# Patient Record
Sex: FEMALE | Race: WHITE | NOT HISPANIC OR LATINO | Employment: UNEMPLOYED | URBAN - METROPOLITAN AREA
[De-identification: names, ages, dates, MRNs, and addresses within clinical notes are randomized per-mention and may not be internally consistent; named-entity substitution may affect disease eponyms.]

---

## 2016-08-09 LAB — HIV 1+2 AB+HIV1 P24 AG SERPL QL IA: NEGATIVE

## 2017-10-15 ENCOUNTER — HOSPITAL ENCOUNTER (EMERGENCY)
Facility: HOSPITAL | Age: 52
Discharge: HOME | End: 2017-10-16
Attending: EMERGENCY MEDICINE
Payer: COMMERCIAL

## 2017-10-15 DIAGNOSIS — R07.9 CHEST PAIN: Primary | ICD-10-CM

## 2017-10-15 LAB
ALBUMIN SERPL BCP-MCNC: 4 G/DL
ALP SERPL-CCNC: 108 U/L
ALT SERPL W/O P-5'-P-CCNC: 17 U/L
ANION GAP SERPL CALC-SCNC: 6 MMOL/L
AST SERPL-CCNC: 35 U/L
BASOPHILS # BLD AUTO: 0.03 K/UL
BASOPHILS NFR BLD: 0.3 %
BILIRUB SERPL-MCNC: 0.3 MG/DL
BNP SERPL-MCNC: 102 PG/ML
BUN SERPL-MCNC: 16 MG/DL
CALCIUM SERPL-MCNC: 9.3 MG/DL
CHLORIDE SERPL-SCNC: 104 MMOL/L
CO2 SERPL-SCNC: 28 MMOL/L
CREAT SERPL-MCNC: 0.8 MG/DL
DIFFERENTIAL METHOD: ABNORMAL
EOSINOPHIL # BLD AUTO: 0.2 K/UL
EOSINOPHIL NFR BLD: 1.9 %
ERYTHROCYTE [DISTWIDTH] IN BLOOD BY AUTOMATED COUNT: 14.4 %
EST. GFR  (AFRICAN AMERICAN): >60 ML/MIN/1.73 M^2
EST. GFR  (NON AFRICAN AMERICAN): >60 ML/MIN/1.73 M^2
GLUCOSE SERPL-MCNC: 87 MG/DL
HCT VFR BLD AUTO: 36.2 %
HGB BLD-MCNC: 11.5 G/DL
LYMPHOCYTES # BLD AUTO: 1.9 K/UL
LYMPHOCYTES NFR BLD: 21.2 %
MCH RBC QN AUTO: 28.5 PG
MCHC RBC AUTO-ENTMCNC: 31.8 G/DL
MCV RBC AUTO: 90 FL
MONOCYTES # BLD AUTO: 0.8 K/UL
MONOCYTES NFR BLD: 8.6 %
NEUTROPHILS # BLD AUTO: 6 K/UL
NEUTROPHILS NFR BLD: 67.8 %
PLATELET # BLD AUTO: 333 K/UL
PMV BLD AUTO: 9.6 FL
POTASSIUM SERPL-SCNC: 4.7 MMOL/L
PROT SERPL-MCNC: 7.5 G/DL
RBC # BLD AUTO: 4.03 M/UL
SODIUM SERPL-SCNC: 138 MMOL/L
TROPONIN I SERPL DL<=0.01 NG/ML-MCNC: <0.006 NG/ML
WBC # BLD AUTO: 8.91 K/UL

## 2017-10-15 PROCEDURE — 25000003 PHARM REV CODE 250: Performed by: EMERGENCY MEDICINE

## 2017-10-15 PROCEDURE — 85025 COMPLETE CBC W/AUTO DIFF WBC: CPT

## 2017-10-15 PROCEDURE — 80053 COMPREHEN METABOLIC PANEL: CPT

## 2017-10-15 PROCEDURE — 83880 ASSAY OF NATRIURETIC PEPTIDE: CPT

## 2017-10-15 PROCEDURE — 84484 ASSAY OF TROPONIN QUANT: CPT

## 2017-10-15 PROCEDURE — 99284 EMERGENCY DEPT VISIT MOD MDM: CPT

## 2017-10-15 PROCEDURE — 93005 ELECTROCARDIOGRAM TRACING: CPT

## 2017-10-15 RX ORDER — ASPIRIN 325 MG
325 TABLET ORAL
Status: COMPLETED | OUTPATIENT
Start: 2017-10-15 | End: 2017-10-15

## 2017-10-15 RX ORDER — DEXLANSOPRAZOLE 60 MG/1
60 CAPSULE, DELAYED RELEASE ORAL DAILY
COMMUNITY
End: 2020-07-02 | Stop reason: SDUPTHER

## 2017-10-15 RX ORDER — ESCITALOPRAM OXALATE 10 MG/1
10 TABLET ORAL DAILY
COMMUNITY
End: 2020-03-18 | Stop reason: SDUPTHER

## 2017-10-15 RX ADMIN — ASPIRIN 325 MG ORAL TABLET 325 MG: 325 PILL ORAL at 08:10

## 2017-10-16 VITALS
TEMPERATURE: 98 F | WEIGHT: 190 LBS | HEIGHT: 62 IN | DIASTOLIC BLOOD PRESSURE: 72 MMHG | OXYGEN SATURATION: 98 % | SYSTOLIC BLOOD PRESSURE: 111 MMHG | RESPIRATION RATE: 16 BRPM | BODY MASS INDEX: 34.96 KG/M2 | HEART RATE: 77 BPM

## 2017-10-16 LAB — TROPONIN I SERPL DL<=0.01 NG/ML-MCNC: <0.006 NG/ML

## 2017-10-16 PROCEDURE — 93005 ELECTROCARDIOGRAM TRACING: CPT

## 2017-10-16 PROCEDURE — 84484 ASSAY OF TROPONIN QUANT: CPT

## 2017-10-16 NOTE — ED NOTES
Pt resting in bed. No acute distress noted. Respirations even and unlabored. Pt updated on plan of care. Will continue to monitor.

## 2017-10-16 NOTE — DISCHARGE INSTRUCTIONS
Your chest pain does not appear to be cardiac.  Take zantac as directed on package for the next week or 2.  If you still have chest pain, see your doctor for further evaluation.

## 2017-10-16 NOTE — ED PROVIDER NOTES
Encounter Date: 10/15/2017       History     Chief Complaint   Patient presents with    Chest Pain     c/o midsternal chest pain and SOB that began suddenly less than an hour ago. States pain radiated to left arm. Pain was relieved moderately with rest.      52F presents with acute onset of CP.  She states she was in the kitchen when she had acute onset of chest tightness in between her breasts.  The pain was severe and constant for about 10 minutes with associated shortness of breath, dizziness, and nausea.  There was no radiation and there were no modifying factors.  The pain resolved spontaneously and has not returned.  She did get concerned so she decided to come to the ER to be checked out.  She has a history of similar pain with anxiety/panic attacks.  She states she is under a lot of stress as her daughter is recovering from major surgery.          Review of patient's allergies indicates:  No Known Allergies  Past Medical History:   Diagnosis Date    GERD (gastroesophageal reflux disease)      Past Surgical History:   Procedure Laterality Date    BUNIONECTOMY      CHOLECYSTECTOMY      HYSTERECTOMY      ROTATOR CUFF REPAIR Left      History reviewed. No pertinent family history.  Social History   Substance Use Topics    Smoking status: Never Smoker    Smokeless tobacco: Not on file    Alcohol use No     Review of Systems   Constitutional: Negative for diaphoresis.   Respiratory: Positive for shortness of breath.    Cardiovascular: Positive for chest pain.   Gastrointestinal: Positive for nausea. Negative for vomiting.   Neurological: Positive for dizziness and light-headedness.   All other systems reviewed and are negative.      Physical Exam     Initial Vitals [10/15/17 1900]   BP Pulse Resp Temp SpO2   121/82 68 18 98.5 °F (36.9 °C) 100 %      MAP       95         Physical Exam    Nursing note and vitals reviewed.  Constitutional: She appears well-developed and well-nourished. She is Obese . No  distress.   HENT:   Head: Normocephalic and atraumatic.   Eyes: Conjunctivae are normal.   Neck: Normal range of motion.   Cardiovascular: Normal rate, regular rhythm and normal heart sounds.   No murmur heard.  Pulmonary/Chest: Breath sounds normal. She has no wheezes. She has no rhonchi. She has no rales.   Abdominal: Soft. Bowel sounds are normal. She exhibits no distension. There is no tenderness.   Musculoskeletal: Normal range of motion. She exhibits no edema or tenderness.   Neurological: She is alert and oriented to person, place, and time.   Skin: Skin is warm and dry.   Psychiatric: She has a normal mood and affect. Her behavior is normal.         ED Course   Procedures  Labs Reviewed   CBC W/ AUTO DIFFERENTIAL - Abnormal; Notable for the following:        Result Value    Hemoglobin 11.5 (*)     Hematocrit 36.2 (*)     MCHC 31.8 (*)     All other components within normal limits   COMPREHENSIVE METABOLIC PANEL - Abnormal; Notable for the following:     Anion Gap 6 (*)     All other components within normal limits   B-TYPE NATRIURETIC PEPTIDE - Abnormal; Notable for the following:      (*)     All other components within normal limits   TROPONIN I   TROPONIN I     EKG Readings: (Independently Interpreted)   Initial Reading: No STEMI. Rhythm: Normal Sinus Rhythm. Heart Rate: 64. Ectopy: No Ectopy. Conduction: Normal. ST Segments: Normal ST Segments. T Waves: Normal. Clinical Impression: Normal Sinus Rhythm   Other EKG Interpretations: EKG#2 - NSR, HR 73, normal ST segments, normal T waves  IMPRESSION - NSR          Medical Decision Making:   Independently Interpreted Test(s):   I have ordered and independently interpreted EKG Reading(s) - see prior notes  Clinical Tests:   Lab Tests: Ordered and Reviewed  Radiological Study: Ordered and Reviewed  Medical Tests: Ordered and Reviewed  ED Management:  Constant CP x 10 minutes with associated SOB, dizziness, and nausea.  No CP in ER.  No acute ischemic  changes on EKG.  First troponin is normal.  Given she is low risk cardiovascular disease, I will reevaluate at the 6 hour troponin and EKG.      Repeat troponin is still negative and EKG still shows no acute ischemic changes.  Patient was discharged with instructions to take Zantac as she does have a history of GERD.  If her chest pain continues she should see her primary care physician.                     ED Course      Clinical Impression:   The encounter diagnosis was Chest pain.                           Mila Clifton MD  10/16/17 0137

## 2017-10-16 NOTE — ED NOTES
Pt presents to ED c/o chest pain X 1 hour. Pt reports she was bending over when the pain began. Pt describes pain as intermittent pressure. Pt reports that pain was located mid sternal and radiated to left arm. Pt reports that the pain is not currently present.

## 2017-12-18 LAB
CHOLEST SERPL-MSCNC: 185 MG/DL (ref 0–200)
HDLC SERPL-MCNC: 63 MG/DL
LDLC SERPL CALC-MCNC: 93 MG/DL
TRIGLYCERIDE (LIPID PAN): 147
VLDL CHOLESTEROL: 29 MG/DL

## 2020-03-18 RX ORDER — ESCITALOPRAM OXALATE 10 MG/1
10 TABLET ORAL DAILY
Qty: 90 TABLET | Refills: 1 | Status: SHIPPED | OUTPATIENT
Start: 2020-03-18 | End: 2020-07-02 | Stop reason: SDUPTHER

## 2020-03-18 RX ORDER — SUMATRIPTAN SUCCINATE 25 MG/1
50 TABLET ORAL
COMMUNITY
End: 2020-03-18 | Stop reason: SDUPTHER

## 2020-03-18 RX ORDER — SUMATRIPTAN SUCCINATE 25 MG/1
25 TABLET ORAL DAILY PRN
Qty: 90 TABLET | Refills: 1 | Status: SHIPPED | OUTPATIENT
Start: 2020-03-18 | End: 2020-09-28

## 2020-03-18 NOTE — TELEPHONE ENCOUNTER
Ok to fill lexapro 10mg 1 po qd and imitrex 25mg 1 po qd prn?  Rescheduled to 6/22 due to covid concerns

## 2020-06-18 ENCOUNTER — TELEPHONE (OUTPATIENT)
Dept: ADMINISTRATIVE | Facility: HOSPITAL | Age: 55
End: 2020-06-18

## 2020-06-18 ENCOUNTER — PATIENT OUTREACH (OUTPATIENT)
Dept: ADMINISTRATIVE | Facility: HOSPITAL | Age: 55
End: 2020-06-18

## 2020-07-02 ENCOUNTER — OFFICE VISIT (OUTPATIENT)
Dept: FAMILY MEDICINE | Facility: CLINIC | Age: 55
End: 2020-07-02
Payer: COMMERCIAL

## 2020-07-02 VITALS
DIASTOLIC BLOOD PRESSURE: 76 MMHG | WEIGHT: 204.81 LBS | HEART RATE: 76 BPM | BODY MASS INDEX: 37.46 KG/M2 | TEMPERATURE: 98 F | SYSTOLIC BLOOD PRESSURE: 118 MMHG | OXYGEN SATURATION: 96 %

## 2020-07-02 DIAGNOSIS — G43.809 OTHER MIGRAINE WITHOUT STATUS MIGRAINOSUS, NOT INTRACTABLE: ICD-10-CM

## 2020-07-02 DIAGNOSIS — K21.9 GASTROESOPHAGEAL REFLUX DISEASE WITHOUT ESOPHAGITIS: ICD-10-CM

## 2020-07-02 DIAGNOSIS — F41.9 ANXIETY: ICD-10-CM

## 2020-07-02 DIAGNOSIS — N95.9 MENOPAUSAL DISORDER: ICD-10-CM

## 2020-07-02 DIAGNOSIS — Z00.00 ANNUAL PHYSICAL EXAM: Primary | ICD-10-CM

## 2020-07-02 PROBLEM — G43.909 MIGRAINE WITHOUT STATUS MIGRAINOSUS, NOT INTRACTABLE: Status: ACTIVE | Noted: 2020-07-02

## 2020-07-02 PROCEDURE — 99396 PR PREVENTIVE VISIT,EST,40-64: ICD-10-PCS | Mod: S$GLB,,, | Performed by: INTERNAL MEDICINE

## 2020-07-02 PROCEDURE — 99999 PR PBB SHADOW E&M-EST. PATIENT-LVL III: CPT | Mod: PBBFAC,,, | Performed by: INTERNAL MEDICINE

## 2020-07-02 PROCEDURE — 99999 PR PBB SHADOW E&M-EST. PATIENT-LVL III: ICD-10-PCS | Mod: PBBFAC,,, | Performed by: INTERNAL MEDICINE

## 2020-07-02 PROCEDURE — 99396 PREV VISIT EST AGE 40-64: CPT | Mod: S$GLB,,, | Performed by: INTERNAL MEDICINE

## 2020-07-02 RX ORDER — BUTALBITAL, ACETAMINOPHEN AND CAFFEINE 50; 325; 40 MG/1; MG/1; MG/1
1 TABLET ORAL EVERY 4 HOURS PRN
Qty: 30 TABLET | Refills: 3 | Status: SHIPPED | OUTPATIENT
Start: 2020-07-02 | End: 2020-08-01

## 2020-07-02 RX ORDER — DEXLANSOPRAZOLE 60 MG/1
60 CAPSULE, DELAYED RELEASE ORAL DAILY
Qty: 90 CAPSULE | Refills: 3 | Status: SHIPPED | OUTPATIENT
Start: 2020-07-02 | End: 2021-05-18 | Stop reason: SDUPTHER

## 2020-07-02 RX ORDER — ESCITALOPRAM OXALATE 10 MG/1
10 TABLET ORAL DAILY
Qty: 90 TABLET | Refills: 3 | Status: SHIPPED | OUTPATIENT
Start: 2020-07-02 | End: 2021-08-08 | Stop reason: SDUPTHER

## 2020-07-02 NOTE — ASSESSMENT & PLAN NOTE
Stable on dexilant, works well for her.  No reflux/nausea/bloating.  Had EGD with Dr. Geronimo in past, had repair of hiatal hernia in past.

## 2020-07-02 NOTE — ASSESSMENT & PLAN NOTE
Stable on imitrex PRN.  Gets bad migraine once per month.  Used to take fioricet for these, has been out.  Takes Imitrex 3 times per month.

## 2020-07-02 NOTE — PROGRESS NOTES
[facility] Note    Chief Complaint      Chief Complaint   Patient presents with    Annual Exam     History of Present Illness      Judy Foley is a 54 y.o. female who presents today for annual preventative visit.  Patient comes to appointment with .     Has been trying to exercise more, not sedentary.  Has been cooking most meals.  Nonsmoker.  Wears seat belt.  Problem List Items Addressed This Visit     Gastroesophageal reflux disease without esophagitis    Current Assessment & Plan     Stable on dexilant, works well for her.  No reflux/nausea/bloating.  Had EGD with Dr. Geronimo in past, had repair of hiatal hernia in past.         Relevant Orders    Vitamin B12    Magnesium    Migraine without status migrainosus, not intractable    Current Assessment & Plan     Stable on imitrex PRN.  Gets bad migraine once per month.  Used to take fioricet for these, has been out.  Takes Imitrex 3 times per month.         Relevant Medications    butalbital-acetaminophen-caffeine -40 mg (FIORICET, ESGIC) -40 mg per tablet    escitalopram oxalate (LEXAPRO) 10 MG tablet    Menopausal disorder    Current Assessment & Plan     Stable on topical estradiol, works well.  Sees GYN.         Anxiety    Current Assessment & Plan     Stable on lexapro 10 mg daily, no SI/HI/panic attacks.  Sleeping really well.         Relevant Medications    dexlansoprazole (DEXILANT) 60 mg capsule      Other Visit Diagnoses     Annual physical exam    -  Primary    Relevant Orders    CBC auto differential    Lipid Panel    Comprehensive metabolic panel    TSH          Health Maintenance   Topic Date Due    Mammogram  10/05/2006    Lipid Panel  12/18/2022    TETANUS VACCINE  12/19/2028       Past Medical History:   Diagnosis Date    GERD (gastroesophageal reflux disease)        Past Surgical History:   Procedure Laterality Date    BUNIONECTOMY      CHOLECYSTECTOMY      HERNIA REPAIR      HYSTERECTOMY       ROTATOR CUFF REPAIR Left        family history includes Heart disease in her daughter and father; Mental retardation in her daughter; Pulmonary fibrosis in her mother.    Social History     Tobacco Use    Smoking status: Never Smoker   Substance Use Topics    Alcohol use: No    Drug use: Not on file       Review of Systems   Constitutional: Negative for chills and fever.   HENT: Negative for congestion and sore throat.    Eyes: Negative for blurred vision and discharge.   Respiratory: Negative for cough and shortness of breath.    Cardiovascular: Negative for chest pain and palpitations.   Gastrointestinal: Negative for constipation, diarrhea, nausea and vomiting.   Genitourinary: Negative for dysuria and hematuria.   Musculoskeletal: Negative for falls and myalgias.   Skin: Negative for itching and rash.   Neurological: Negative for dizziness and headaches.        Outpatient Encounter Medications as of 7/2/2020   Medication Sig Dispense Refill    escitalopram oxalate (LEXAPRO) 10 MG tablet Take 1 tablet (10 mg total) by mouth once daily. 90 tablet 3    estradiol 1.25 gram/actuation topical gel Place 1.25 g onto the skin once daily.      multivitamin capsule Take 1 capsule by mouth once daily.      sumatriptan (IMITREX) 25 MG Tab Take 1 tablet (25 mg total) by mouth daily as needed. 90 tablet 1    [DISCONTINUED] dexlansoprazole (DEXILANT) 60 mg capsule Take 60 mg by mouth once daily.      [DISCONTINUED] escitalopram oxalate (LEXAPRO) 10 MG tablet Take 1 tablet (10 mg total) by mouth once daily. 90 tablet 1    butalbital-acetaminophen-caffeine -40 mg (FIORICET, ESGIC) -40 mg per tablet Take 1 tablet by mouth every 4 (four) hours as needed for Headaches. 30 tablet 3    dexlansoprazole (DEXILANT) 60 mg capsule Take 1 capsule (60 mg total) by mouth once daily. 90 capsule 3     No facility-administered encounter medications on file as of 7/2/2020.         Review of patient's allergies  indicates:  No Known Allergies    Physical Exam      Vital Signs  Temp: 97.6 °F (36.4 °C)  Temp src: Oral  Pulse: 76  SpO2: 96 %  BP: 118/76  BP Location: Left arm  Patient Position: Sitting  Pain Score:   1  Pain Loc: Finger  Height and Weight  Weight: 92.9 kg (204 lb 12.9 oz)]  Body mass index is 37.46 kg/m².    Physical Exam  Constitutional:       Appearance: She is well-developed.   HENT:      Head: Normocephalic and atraumatic.      Right Ear: External ear normal.      Left Ear: External ear normal.   Eyes:      General:         Right eye: No discharge.         Left eye: No discharge.   Neck:      Musculoskeletal: Normal range of motion.      Thyroid: No thyromegaly.   Cardiovascular:      Rate and Rhythm: Normal rate and regular rhythm.      Heart sounds: Normal heart sounds. No murmur.   Pulmonary:      Effort: Pulmonary effort is normal. No respiratory distress.      Breath sounds: Normal breath sounds.   Abdominal:      General: Bowel sounds are normal. There is no distension.      Palpations: Abdomen is soft.      Tenderness: There is no abdominal tenderness.   Musculoskeletal: Normal range of motion.         General: No deformity.   Skin:     General: Skin is warm and dry.      Findings: No rash.   Neurological:      Mental Status: She is alert and oriented to person, place, and time.   Psychiatric:         Behavior: Behavior normal.          Laboratory:  CBC:  No results for input(s): WBC, RBC, HGB, HCT, PLT, MCV, MCH, MCHC in the last 2160 hours.  CMP:  No results for input(s): GLU, CALCIUM, ALBUMIN, PROT, NA, K, CO2, CL, BUN, ALKPHOS, ALT, AST, BILITOT in the last 2160 hours.    Invalid input(s): CREATININ  URINALYSIS:  No results for input(s): COLORU, CLARITYU, SPECGRAV, PHUR, PROTEINUA, GLUCOSEU, BILIRUBINCON, BLOODU, WBCU, RBCU, BACTERIA, MUCUS, NITRITE, LEUKOCYTESUR, UROBILINOGEN, HYALINECASTS in the last 2160 hours.   LIPIDS:  No results for input(s): TSH, HDL, CHOL, TRIG, LDLCALC, CHOLHDL,  NONHDLCHOL, TOTALCHOLEST in the last 2160 hours.  TSH:  No results for input(s): TSH in the last 2160 hours.  A1C:  No results for input(s): HGBA1C in the last 2160 hours.    Radiology:  No new imaging on file    Assessment/Plan     Judy Foley is a 54 y.o.female with:    1. Annual physical exam  - CBC auto differential; Future  - Lipid Panel; Future  - Comprehensive metabolic panel; Future  - TSH; Future    2. Gastroesophageal reflux disease without esophagitis  - Vitamin B12; Future  - Magnesium; Future    3. Other migraine without status migrainosus, not intractable  - butalbital-acetaminophen-caffeine -40 mg (FIORICET, ESGIC) -40 mg per tablet; Take 1 tablet by mouth every 4 (four) hours as needed for Headaches.  Dispense: 30 tablet; Refill: 3  - escitalopram oxalate (LEXAPRO) 10 MG tablet; Take 1 tablet (10 mg total) by mouth once daily.  Dispense: 90 tablet; Refill: 3    4. Menopausal disorder    5. Anxiety  - dexlansoprazole (DEXILANT) 60 mg capsule; Take 1 capsule (60 mg total) by mouth once daily.  Dispense: 90 capsule; Refill: 3    -labs ordered  -Continue current medications and maintain follow up with specialists.  Return to clinic in 6 months.      Rachel Wise MD  [facility] [place]

## 2020-07-07 ENCOUNTER — LAB VISIT (OUTPATIENT)
Dept: LAB | Facility: HOSPITAL | Age: 55
End: 2020-07-07
Attending: INTERNAL MEDICINE
Payer: COMMERCIAL

## 2020-07-07 DIAGNOSIS — K21.9 GASTROESOPHAGEAL REFLUX DISEASE WITHOUT ESOPHAGITIS: ICD-10-CM

## 2020-07-07 DIAGNOSIS — Z00.00 ANNUAL PHYSICAL EXAM: ICD-10-CM

## 2020-07-07 DIAGNOSIS — R79.89 HIGH SERUM THYROID STIMULATING HORMONE (TSH): Primary | ICD-10-CM

## 2020-07-07 LAB
ALBUMIN SERPL BCP-MCNC: 3.8 G/DL (ref 3.5–5.2)
ALP SERPL-CCNC: 96 U/L (ref 55–135)
ALT SERPL W/O P-5'-P-CCNC: 24 U/L (ref 10–44)
ANION GAP SERPL CALC-SCNC: 9 MMOL/L (ref 8–16)
AST SERPL-CCNC: 20 U/L (ref 10–40)
BASOPHILS # BLD AUTO: 0.06 K/UL (ref 0–0.2)
BASOPHILS NFR BLD: 0.9 % (ref 0–1.9)
BILIRUB SERPL-MCNC: 0.2 MG/DL (ref 0.1–1)
BUN SERPL-MCNC: 13 MG/DL (ref 6–20)
CALCIUM SERPL-MCNC: 9.8 MG/DL (ref 8.7–10.5)
CHLORIDE SERPL-SCNC: 107 MMOL/L (ref 95–110)
CHOLEST SERPL-MCNC: 189 MG/DL (ref 120–199)
CHOLEST/HDLC SERPL: 2.7 {RATIO} (ref 2–5)
CO2 SERPL-SCNC: 26 MMOL/L (ref 23–29)
CREAT SERPL-MCNC: 0.8 MG/DL (ref 0.5–1.4)
DIFFERENTIAL METHOD: ABNORMAL
EOSINOPHIL # BLD AUTO: 0.2 K/UL (ref 0–0.5)
EOSINOPHIL NFR BLD: 3.5 % (ref 0–8)
ERYTHROCYTE [DISTWIDTH] IN BLOOD BY AUTOMATED COUNT: 14.6 % (ref 11.5–14.5)
EST. GFR  (AFRICAN AMERICAN): >60 ML/MIN/1.73 M^2
EST. GFR  (NON AFRICAN AMERICAN): >60 ML/MIN/1.73 M^2
GLUCOSE SERPL-MCNC: 92 MG/DL (ref 70–110)
HCT VFR BLD AUTO: 36.6 % (ref 37–48.5)
HDLC SERPL-MCNC: 70 MG/DL (ref 40–75)
HDLC SERPL: 37 % (ref 20–50)
HGB BLD-MCNC: 11.4 G/DL (ref 12–16)
IMM GRANULOCYTES # BLD AUTO: 0.03 K/UL (ref 0–0.04)
IMM GRANULOCYTES NFR BLD AUTO: 0.4 % (ref 0–0.5)
LDLC SERPL CALC-MCNC: 97 MG/DL (ref 63–159)
LYMPHOCYTES # BLD AUTO: 2 K/UL (ref 1–4.8)
LYMPHOCYTES NFR BLD: 28.9 % (ref 18–48)
MAGNESIUM SERPL-MCNC: 2 MG/DL (ref 1.6–2.6)
MCH RBC QN AUTO: 27 PG (ref 27–31)
MCHC RBC AUTO-ENTMCNC: 31.1 G/DL (ref 32–36)
MCV RBC AUTO: 87 FL (ref 82–98)
MONOCYTES # BLD AUTO: 0.6 K/UL (ref 0.3–1)
MONOCYTES NFR BLD: 8.3 % (ref 4–15)
NEUTROPHILS # BLD AUTO: 4 K/UL (ref 1.8–7.7)
NEUTROPHILS NFR BLD: 58 % (ref 38–73)
NONHDLC SERPL-MCNC: 119 MG/DL
NRBC BLD-RTO: 0 /100 WBC
PLATELET # BLD AUTO: 332 K/UL (ref 150–350)
PMV BLD AUTO: 10.3 FL (ref 9.2–12.9)
POTASSIUM SERPL-SCNC: 4.4 MMOL/L (ref 3.5–5.1)
PROT SERPL-MCNC: 7.6 G/DL (ref 6–8.4)
RBC # BLD AUTO: 4.22 M/UL (ref 4–5.4)
SODIUM SERPL-SCNC: 142 MMOL/L (ref 136–145)
T4 FREE SERPL-MCNC: 0.8 NG/DL (ref 0.71–1.51)
TRIGL SERPL-MCNC: 110 MG/DL (ref 30–150)
TSH SERPL DL<=0.005 MIU/L-ACNC: 4.72 UIU/ML (ref 0.4–4)
VIT B12 SERPL-MCNC: 501 PG/ML (ref 210–950)
WBC # BLD AUTO: 6.86 K/UL (ref 3.9–12.7)

## 2020-07-07 PROCEDURE — 80053 COMPREHEN METABOLIC PANEL: CPT

## 2020-07-07 PROCEDURE — 85025 COMPLETE CBC W/AUTO DIFF WBC: CPT

## 2020-07-07 PROCEDURE — 84439 ASSAY OF FREE THYROXINE: CPT

## 2020-07-07 PROCEDURE — 84443 ASSAY THYROID STIM HORMONE: CPT

## 2020-07-07 PROCEDURE — 36415 COLL VENOUS BLD VENIPUNCTURE: CPT

## 2020-07-07 PROCEDURE — 80061 LIPID PANEL: CPT

## 2020-07-07 PROCEDURE — 82607 VITAMIN B-12: CPT

## 2020-07-07 PROCEDURE — 83735 ASSAY OF MAGNESIUM: CPT

## 2020-07-09 ENCOUNTER — TELEPHONE (OUTPATIENT)
Dept: ADMINISTRATIVE | Facility: HOSPITAL | Age: 55
End: 2020-07-09

## 2020-07-09 ENCOUNTER — PATIENT OUTREACH (OUTPATIENT)
Dept: ADMINISTRATIVE | Facility: HOSPITAL | Age: 55
End: 2020-07-09

## 2020-07-17 DIAGNOSIS — Z12.39 BREAST CANCER SCREENING: ICD-10-CM

## 2020-08-14 ENCOUNTER — OFFICE VISIT (OUTPATIENT)
Dept: ORTHOPEDICS | Facility: CLINIC | Age: 55
End: 2020-08-14
Payer: COMMERCIAL

## 2020-08-14 ENCOUNTER — HOSPITAL ENCOUNTER (OUTPATIENT)
Dept: RADIOLOGY | Facility: HOSPITAL | Age: 55
Discharge: HOME | End: 2020-08-14
Attending: ORTHOPAEDIC SURGERY
Payer: COMMERCIAL

## 2020-08-14 VITALS — BODY MASS INDEX: 37.54 KG/M2 | HEIGHT: 62 IN | WEIGHT: 204 LBS

## 2020-08-14 DIAGNOSIS — M79.646 PAIN OF FINGER, UNSPECIFIED LATERALITY: ICD-10-CM

## 2020-08-14 DIAGNOSIS — M65.339 TRIGGER MIDDLE FINGER, UNSPECIFIED LATERALITY: ICD-10-CM

## 2020-08-14 DIAGNOSIS — M67.442 MUCOUS CYST OF DIGIT OF LEFT HAND: Primary | ICD-10-CM

## 2020-08-14 PROCEDURE — 73130 XR HAND COMPLETE 3 VIEW LEFT: ICD-10-PCS | Mod: 26,LT,, | Performed by: RADIOLOGY

## 2020-08-14 PROCEDURE — 3008F PR BODY MASS INDEX (BMI) DOCUMENTED: ICD-10-PCS | Mod: CPTII,S$GLB,, | Performed by: ORTHOPAEDIC SURGERY

## 2020-08-14 PROCEDURE — 20550 NJX 1 TENDON SHEATH/LIGAMENT: CPT | Mod: F2,S$GLB,, | Performed by: ORTHOPAEDIC SURGERY

## 2020-08-14 PROCEDURE — 73130 X-RAY EXAM OF HAND: CPT | Mod: 26,LT,, | Performed by: RADIOLOGY

## 2020-08-14 PROCEDURE — 99999 PR PBB SHADOW E&M-EST. PATIENT-LVL IV: CPT | Mod: PBBFAC,,, | Performed by: ORTHOPAEDIC SURGERY

## 2020-08-14 PROCEDURE — 99999 PR PBB SHADOW E&M-EST. PATIENT-LVL IV: ICD-10-PCS | Mod: PBBFAC,,, | Performed by: ORTHOPAEDIC SURGERY

## 2020-08-14 PROCEDURE — 99203 OFFICE O/P NEW LOW 30 MIN: CPT | Mod: 25,S$GLB,, | Performed by: ORTHOPAEDIC SURGERY

## 2020-08-14 PROCEDURE — 73130 X-RAY EXAM OF HAND: CPT | Mod: TC,PN,LT

## 2020-08-14 PROCEDURE — 99203 PR OFFICE/OUTPT VISIT, NEW, LEVL III, 30-44 MIN: ICD-10-PCS | Mod: 25,S$GLB,, | Performed by: ORTHOPAEDIC SURGERY

## 2020-08-14 PROCEDURE — 3008F BODY MASS INDEX DOCD: CPT | Mod: CPTII,S$GLB,, | Performed by: ORTHOPAEDIC SURGERY

## 2020-08-14 PROCEDURE — 20550 TENDON SHEATH: ICD-10-PCS | Mod: F2,S$GLB,, | Performed by: ORTHOPAEDIC SURGERY

## 2020-08-14 RX ORDER — TRIAMCINOLONE ACETONIDE 40 MG/ML
40 INJECTION, SUSPENSION INTRA-ARTICULAR; INTRAMUSCULAR
Status: DISCONTINUED | OUTPATIENT
Start: 2020-08-14 | End: 2020-08-14 | Stop reason: HOSPADM

## 2020-08-14 RX ORDER — TIZANIDINE 4 MG/1
TABLET ORAL
COMMUNITY
Start: 2020-08-14 | End: 2022-03-03

## 2020-08-14 RX ORDER — METAXALONE 800 MG/1
TABLET ORAL
COMMUNITY
Start: 2020-06-02 | End: 2022-03-03

## 2020-08-14 RX ORDER — BUTALBITAL, ACETAMINOPHEN AND CAFFEINE 50; 325; 40 MG/1; MG/1; MG/1
TABLET ORAL
COMMUNITY
Start: 2020-08-03 | End: 2020-11-16

## 2020-08-14 RX ADMIN — TRIAMCINOLONE ACETONIDE 40 MG: 40 INJECTION, SUSPENSION INTRA-ARTICULAR; INTRAMUSCULAR at 08:08

## 2020-08-14 NOTE — PROGRESS NOTES
"Subjective:      Patient ID: Judy Foley is a 54 y.o. female.    Chief Complaint: Joint Swelling of the Left Hand      HPI: Judy Foley is a new patient here with complaints of left middle and index finger pain. She reports episodes of "locking", "clicking" and stiffness in the middle and index fingers for about 6 months. Middle > Index. She also complains of a painful bump at the middle finger DIP  that has been increasing in size over the last month. She denies any h/o injury. She denies and numbness or tingling.     Past Medical History:   Diagnosis Date    GERD (gastroesophageal reflux disease)        Current Outpatient Medications:     butalbital-acetaminophen-caffeine -40 mg (FIORICET, ESGIC) -40 mg per tablet, TAKE 1 TABLET BY MOUTH EVERY 4 (FOUR) HOURS AS NEEDED FOR HEADACHES., Disp: , Rfl:     dexlansoprazole (DEXILANT) 60 mg capsule, Take 1 capsule (60 mg total) by mouth once daily., Disp: 90 capsule, Rfl: 3    escitalopram oxalate (LEXAPRO) 10 MG tablet, Take 1 tablet (10 mg total) by mouth once daily., Disp: 90 tablet, Rfl: 3    estradiol 1.25 gram/actuation topical gel, Place 1.25 g onto the skin once daily., Disp: , Rfl:     metaxalone (SKELAXIN) 800 MG tablet, , Disp: , Rfl:     multivitamin capsule, Take 1 capsule by mouth once daily., Disp: , Rfl:     sumatriptan (IMITREX) 25 MG Tab, Take 1 tablet (25 mg total) by mouth daily as needed., Disp: 90 tablet, Rfl: 1    tiZANidine (ZANAFLEX) 4 MG tablet, , Disp: , Rfl:   Review of patient's allergies indicates:  No Known Allergies    Ht 5' 2" (1.575 m)   Wt 92.5 kg (204 lb)   BMI 37.31 kg/m²     Review of Systems   Constitution: Negative for chills and fever.   Cardiovascular: Negative for chest pain and palpitations.   Respiratory: Negative for shortness of breath and wheezing.    Skin: Negative for poor wound healing and rash.   Musculoskeletal: Positive for joint pain, joint swelling and stiffness.   Gastrointestinal: Negative for " nausea and vomiting.   Genitourinary: Negative for dysuria and hematuria.   Neurological: Negative for numbness, paresthesias, seizures and tremors.   Psychiatric/Behavioral: Negative for altered mental status.   Allergic/Immunologic: Negative for environmental allergies and persistent infections.         Objective:    Ortho Exam       Left upper extremity:  Significant for 0.5 time 5 cm nodule at the middle finger DIP consistent mucous cyst.  Mildly tender to palpation.  Range of motion slightly limited at the DIP.  Range of motion remaining fingers full.  There is also clicking over the middle finger A1 pulley.  There is no clicking or tenderness over the index finger A1 pulley on exam today.  Sensation intact.  Pulses present.  Cap refill brisk.  GEN: Well developed, well nourished female. AAOX3. No acute distress.   Normocephalic, atraumatic.   ADRYAN  Breathing unlabored.  Mood and affect appropriate.     Assessment:     Imaging:  Left hand radiographs from today significant for mild degenerative changes at the distal interphalangeal joint of the middle finger.        1. Mucous cyst of digit of left hand    2. Pain of finger, unspecified laterality    3. Trigger middle finger, unspecified laterality          Plan:       Explain my clinical impression to the patient today to include mucous and trigger finger of the middle finger.  She may be having triggering of index finger as well but this seems to be less bothersome at this time.    I explained treatment options to include corticosteroid injection for the trigger finger - pt is agreeable.  We discussed the option for surgical excision of the cyst with combo trigger finger release; she would like to think it over.    Orders Placed This Encounter    Tendon Sheath    X-Ray Hand Complete Left     Follow up in about 6 weeks (around 9/25/2020).

## 2020-08-14 NOTE — LETTER
August 14, 2020      Rachel Wise MD  [address] [address]  [address]  [place] [address]           [facility]  [address]  [facility] [address]  Phone: [phone]          Patient: Judy Foley   MR Number: [id number]   YOB: 1965   Date of Visit: 8/14/2020       Dear Dr. Rachel Wise:    Thank you for referring Judy Foley to me for evaluation. Attached you will find relevant portions of my assessment and plan of care.    If you have questions, please do not hesitate to call me. I look forward to following Judy Foley along with you.    Sincerely,    Rebecca Wilkinson PA-C    Enclosure  CC:  No Recipients    If you would like to receive this communication electronically, please contact [email or web address] or [phone] to request more information on [employer] access.    For providers and/or their staff who would like to refer a patient to [facility], please contact us through our one-stop-shop provider referral line, [facility], at [phone].    If you feel you have received this communication in error or would no longer like to receive these types of communications, please e-mail [email or web address]

## 2020-08-14 NOTE — PROCEDURES
Tendon Sheath    Date/Time: 8/14/2020 8:00 AM  Performed by: Rebecca Wilkinson PA-C  Authorized by: Rebecca Wilkinson PA-C     Medications:  40 mg triamcinolone acetonide 40 mg/mL    PROCEDURE:  I have explained the risks, benefits, and alternatives of the procedure in detail.  The patient voices understanding, gives consent, and all questions have been answered.  Pause for timeout. A sterile prep of the skin performed, then the left middle finger flexor tendon is injected using a 25 gauge needle with a combination of 0.5 cc 1% plain xylocaine and 20 mg of Kenalog.  The remaining 20 mg of Kenolog was properly wasted. The patient is cautioned and immediate relief of pain is secondary to the local anesthetic and will be temporary.  After the anesthetic wears off there may be a increase in pain that may last for a few hours or a few days and they should use ice to help alleviate this flair up of pain. Patient tolerated the procedure well.

## 2020-08-25 ENCOUNTER — LAB VISIT (OUTPATIENT)
Dept: LAB | Facility: HOSPITAL | Age: 55
End: 2020-08-25
Attending: INTERNAL MEDICINE
Payer: COMMERCIAL

## 2020-08-25 DIAGNOSIS — R79.89 HIGH SERUM THYROID STIMULATING HORMONE (TSH): ICD-10-CM

## 2020-08-25 LAB
T4 FREE SERPL-MCNC: 0.93 NG/DL (ref 0.71–1.51)
TSH SERPL DL<=0.005 MIU/L-ACNC: 2.08 UIU/ML (ref 0.4–4)

## 2020-08-25 PROCEDURE — 36415 COLL VENOUS BLD VENIPUNCTURE: CPT

## 2020-08-25 PROCEDURE — 84439 ASSAY OF FREE THYROXINE: CPT

## 2020-08-25 PROCEDURE — 84443 ASSAY THYROID STIM HORMONE: CPT

## 2020-09-03 ENCOUNTER — TELEPHONE (OUTPATIENT)
Dept: ORTHOPEDICS | Facility: CLINIC | Age: 55
End: 2020-09-03

## 2020-09-03 ENCOUNTER — OFFICE VISIT (OUTPATIENT)
Dept: ORTHOPEDICS | Facility: CLINIC | Age: 55
End: 2020-09-03
Payer: COMMERCIAL

## 2020-09-03 VITALS — WEIGHT: 203.94 LBS | BODY MASS INDEX: 37.53 KG/M2 | HEIGHT: 62 IN

## 2020-09-03 DIAGNOSIS — Z41.9 ELECTIVE SURGERY: ICD-10-CM

## 2020-09-03 DIAGNOSIS — M65.332 TRIGGER MIDDLE FINGER OF LEFT HAND: Primary | ICD-10-CM

## 2020-09-03 DIAGNOSIS — M65.30 TRIGGER FINGER: ICD-10-CM

## 2020-09-03 PROCEDURE — 99999 PR PBB SHADOW E&M-EST. PATIENT-LVL IV: CPT | Mod: PBBFAC,,, | Performed by: ORTHOPAEDIC SURGERY

## 2020-09-03 PROCEDURE — 3008F PR BODY MASS INDEX (BMI) DOCUMENTED: ICD-10-PCS | Mod: CPTII,S$GLB,, | Performed by: ORTHOPAEDIC SURGERY

## 2020-09-03 PROCEDURE — 99214 PR OFFICE/OUTPT VISIT, EST, LEVL IV, 30-39 MIN: ICD-10-PCS | Mod: S$GLB,,, | Performed by: ORTHOPAEDIC SURGERY

## 2020-09-03 PROCEDURE — 3008F BODY MASS INDEX DOCD: CPT | Mod: CPTII,S$GLB,, | Performed by: ORTHOPAEDIC SURGERY

## 2020-09-03 PROCEDURE — 99999 PR PBB SHADOW E&M-EST. PATIENT-LVL IV: ICD-10-PCS | Mod: PBBFAC,,, | Performed by: ORTHOPAEDIC SURGERY

## 2020-09-03 PROCEDURE — 99214 OFFICE O/P EST MOD 30 MIN: CPT | Mod: S$GLB,,, | Performed by: ORTHOPAEDIC SURGERY

## 2020-09-03 NOTE — H&P (VIEW-ONLY)
CC:  Triggering of the left middle finger and mucous cyst left middle finger        HPI:  Judy Foley is a very pleasant 54 y.o. female with ongoing symptoms of left hand middle finger for the past 6 months  She reports painful locking of the middle finger as well as this painful cyst on the dorsum of the same digit  No history of infection no history of trauma  She has tried injection in the past with only slight temporary improvement  She would like to consider surgery for the left middle finger         PAST MEDICAL HISTORY:   Past Medical History:   Diagnosis Date    GERD (gastroesophageal reflux disease)      PAST SURGICAL HISTORY:   Past Surgical History:   Procedure Laterality Date    BUNIONECTOMY      CHOLECYSTECTOMY      colonoscopy  12/18/2017    Normal    HERNIA REPAIR      HYSTERECTOMY      ROTATOR CUFF REPAIR Left      FAMILY HISTORY:   Family History   Problem Relation Age of Onset    Pulmonary fibrosis Mother     Heart disease Father     Mental retardation Daughter     Heart disease Daughter      SOCIAL HISTORY:   Social History     Socioeconomic History    Marital status:      Spouse name: Not on file    Number of children: Not on file    Years of education: Not on file    Highest education level: Not on file   Occupational History    Not on file   Social Needs    Financial resource strain: Not on file    Food insecurity     Worry: Not on file     Inability: Not on file    Transportation needs     Medical: Not on file     Non-medical: Not on file   Tobacco Use    Smoking status: Never Smoker   Substance and Sexual Activity    Alcohol use: No    Drug use: Not on file    Sexual activity: Not on file   Lifestyle    Physical activity     Days per week: Not on file     Minutes per session: Not on file    Stress: Not on file   Relationships    Social connections     Talks on phone: Not on file     Gets together: Not on file     Attends Rastafari service: Not on file     Active  "member of club or organization: Not on file     Attends meetings of clubs or organizations: Not on file     Relationship status: Not on file   Other Topics Concern    Not on file   Social History Narrative    Not on file       MEDICATIONS:   Current Outpatient Medications:     dexlansoprazole (DEXILANT) 60 mg capsule, Take 1 capsule (60 mg total) by mouth once daily., Disp: 90 capsule, Rfl: 3    escitalopram oxalate (LEXAPRO) 10 MG tablet, Take 1 tablet (10 mg total) by mouth once daily., Disp: 90 tablet, Rfl: 3    estradiol 1.25 gram/actuation topical gel, Place 1.25 g onto the skin once daily., Disp: , Rfl:     metaxalone (SKELAXIN) 800 MG tablet, , Disp: , Rfl:     multivitamin capsule, Take 1 capsule by mouth once daily., Disp: , Rfl:     sumatriptan (IMITREX) 25 MG Tab, Take 1 tablet (25 mg total) by mouth daily as needed., Disp: 90 tablet, Rfl: 1    butalbital-acetaminophen-caffeine -40 mg (FIORICET, ESGIC) -40 mg per tablet, TAKE 1 TABLET BY MOUTH EVERY 4 (FOUR) HOURS AS NEEDED FOR HEADACHES., Disp: , Rfl:     tiZANidine (ZANAFLEX) 4 MG tablet, , Disp: , Rfl:   ALLERGIES: Review of patient's allergies indicates:  No Known Allergies    Review of Systems:  Constitutional: no fever or chills  ENT: no nasal congestion or sore throat  Respiratory: no cough or shortness of breath  Cardiovascular: no chest pain or palpitations  Gastrointestinal: no nausea or vomiting, PUD, GERD, NSAID intolerance  Genitourinary: no hematuria or dysuria  Integument/Breast: no rash or pruritis  Hematologic/Lymphatic: no easy bruising or lymphadenopathy  Musculoskeletal: see HPI  Neurological: no seizures or tremors  Behavioral/Psych: no auditory or visual hallucinations      Physical Exam   Vitals:    09/03/20 1337   Weight: 92.5 kg (203 lb 14.8 oz)   Height: 5' 2" (1.575 m)   PainSc:   5   PainLoc: Finger       Constitutional: Oriented to person, place, and time. Appears well-developed and well-nourished. " "  HENT:   Head: Normocephalic and atraumatic.   Nose: Nose normal.   Eyes: No scleral icterus.   Neck: Normal range of motion.   Cardiovascular: Normal rate and regular rhythm.    Pulses:       Radial pulses are 2+ on the right side, and 2+ on the left side.   Pulmonary/Chest: Effort normal and breath sounds normal.   Abdominal: Soft.   Neurological: Alert and oriented to person, place, and time.   Skin: Skin is warm.   Psychiatric: Normal mood and affect.     MUSCULOSKELETAL UPPER EXTREMITY:  Examination left hand demonstrates tenderness over the flexor tendon sheath left middle finger at the A1 pulley  Positive triggering causing pain noted   strength decreased  No instability  Sensation intact  On the dorsum of the left middle finger there is a small cystic mass over the DIP joint slightly tender to touch no evidence of infection no involvement of the nail bed or nail plate            Diagnostic Studies:  None        Assessment:  1.  Triggering left middle finger.  2.  Mucous cyst left middle finger dorsal symptomatic    Plan:  Patient would like to set up surgery for combined procedure for the left middle finger  This would include trigger release left middle finger and excision cyst on the dorsum of the middle finger.  The risks and benefits of surgery explained to the patient she understands      The risks and benefits of surgery were discussed with the patient today and they understand.  The consent was signed in the office for surgery.      Ulysses Turner MD (Jay)  [facility]  Orthopedic Upper Extremity Surgery      "

## 2020-09-03 NOTE — PROGRESS NOTES
CC:  Triggering of the left middle finger and mucous cyst left middle finger        HPI:  Judy Foley is a very pleasant 54 y.o. female with ongoing symptoms of left hand middle finger for the past 6 months  She reports painful locking of the middle finger as well as this painful cyst on the dorsum of the same digit  No history of infection no history of trauma  She has tried injection in the past with only slight temporary improvement  She would like to consider surgery for the left middle finger         PAST MEDICAL HISTORY:   Past Medical History:   Diagnosis Date    GERD (gastroesophageal reflux disease)      PAST SURGICAL HISTORY:   Past Surgical History:   Procedure Laterality Date    BUNIONECTOMY      CHOLECYSTECTOMY      colonoscopy  12/18/2017    Normal    HERNIA REPAIR      HYSTERECTOMY      ROTATOR CUFF REPAIR Left      FAMILY HISTORY:   Family History   Problem Relation Age of Onset    Pulmonary fibrosis Mother     Heart disease Father     Mental retardation Daughter     Heart disease Daughter      SOCIAL HISTORY:   Social History     Socioeconomic History    Marital status:      Spouse name: Not on file    Number of children: Not on file    Years of education: Not on file    Highest education level: Not on file   Occupational History    Not on file   Social Needs    Financial resource strain: Not on file    Food insecurity     Worry: Not on file     Inability: Not on file    Transportation needs     Medical: Not on file     Non-medical: Not on file   Tobacco Use    Smoking status: Never Smoker   Substance and Sexual Activity    Alcohol use: No    Drug use: Not on file    Sexual activity: Not on file   Lifestyle    Physical activity     Days per week: Not on file     Minutes per session: Not on file    Stress: Not on file   Relationships    Social connections     Talks on phone: Not on file     Gets together: Not on file     Attends Shinto service: Not on file     Active  "member of club or organization: Not on file     Attends meetings of clubs or organizations: Not on file     Relationship status: Not on file   Other Topics Concern    Not on file   Social History Narrative    Not on file       MEDICATIONS:   Current Outpatient Medications:     dexlansoprazole (DEXILANT) 60 mg capsule, Take 1 capsule (60 mg total) by mouth once daily., Disp: 90 capsule, Rfl: 3    escitalopram oxalate (LEXAPRO) 10 MG tablet, Take 1 tablet (10 mg total) by mouth once daily., Disp: 90 tablet, Rfl: 3    estradiol 1.25 gram/actuation topical gel, Place 1.25 g onto the skin once daily., Disp: , Rfl:     metaxalone (SKELAXIN) 800 MG tablet, , Disp: , Rfl:     multivitamin capsule, Take 1 capsule by mouth once daily., Disp: , Rfl:     sumatriptan (IMITREX) 25 MG Tab, Take 1 tablet (25 mg total) by mouth daily as needed., Disp: 90 tablet, Rfl: 1    butalbital-acetaminophen-caffeine -40 mg (FIORICET, ESGIC) -40 mg per tablet, TAKE 1 TABLET BY MOUTH EVERY 4 (FOUR) HOURS AS NEEDED FOR HEADACHES., Disp: , Rfl:     tiZANidine (ZANAFLEX) 4 MG tablet, , Disp: , Rfl:   ALLERGIES: Review of patient's allergies indicates:  No Known Allergies    Review of Systems:  Constitutional: no fever or chills  ENT: no nasal congestion or sore throat  Respiratory: no cough or shortness of breath  Cardiovascular: no chest pain or palpitations  Gastrointestinal: no nausea or vomiting, PUD, GERD, NSAID intolerance  Genitourinary: no hematuria or dysuria  Integument/Breast: no rash or pruritis  Hematologic/Lymphatic: no easy bruising or lymphadenopathy  Musculoskeletal: see HPI  Neurological: no seizures or tremors  Behavioral/Psych: no auditory or visual hallucinations      Physical Exam   Vitals:    09/03/20 1337   Weight: 92.5 kg (203 lb 14.8 oz)   Height: 5' 2" (1.575 m)   PainSc:   5   PainLoc: Finger       Constitutional: Oriented to person, place, and time. Appears well-developed and well-nourished. " "  HENT:   Head: Normocephalic and atraumatic.   Nose: Nose normal.   Eyes: No scleral icterus.   Neck: Normal range of motion.   Cardiovascular: Normal rate and regular rhythm.    Pulses:       Radial pulses are 2+ on the right side, and 2+ on the left side.   Pulmonary/Chest: Effort normal and breath sounds normal.   Abdominal: Soft.   Neurological: Alert and oriented to person, place, and time.   Skin: Skin is warm.   Psychiatric: Normal mood and affect.     MUSCULOSKELETAL UPPER EXTREMITY:  Examination left hand demonstrates tenderness over the flexor tendon sheath left middle finger at the A1 pulley  Positive triggering causing pain noted   strength decreased  No instability  Sensation intact  On the dorsum of the left middle finger there is a small cystic mass over the DIP joint slightly tender to touch no evidence of infection no involvement of the nail bed or nail plate            Diagnostic Studies:  None        Assessment:  1.  Triggering left middle finger.  2.  Mucous cyst left middle finger dorsal symptomatic    Plan:  Patient would like to set up surgery for combined procedure for the left middle finger  This would include trigger release left middle finger and excision cyst on the dorsum of the middle finger.  The risks and benefits of surgery explained to the patient she understands      The risks and benefits of surgery were discussed with the patient today and they understand.  The consent was signed in the office for surgery.      Ulysses Turner MD (Jay)  [facility]  Orthopedic Upper Extremity Surgery      "

## 2020-09-10 ENCOUNTER — ANESTHESIA EVENT (OUTPATIENT)
Dept: SURGERY | Facility: HOSPITAL | Age: 55
End: 2020-09-10
Payer: COMMERCIAL

## 2020-09-19 ENCOUNTER — CLINICAL SUPPORT (OUTPATIENT)
Dept: URGENT CARE | Facility: CLINIC | Age: 55
End: 2020-09-19
Payer: COMMERCIAL

## 2020-09-19 DIAGNOSIS — Z41.9 ELECTIVE SURGERY: ICD-10-CM

## 2020-09-19 PROCEDURE — U0003 INFECTIOUS AGENT DETECTION BY NUCLEIC ACID (DNA OR RNA); SEVERE ACUTE RESPIRATORY SYNDROME CORONAVIRUS 2 (SARS-COV-2) (CORONAVIRUS DISEASE [COVID-19]), AMPLIFIED PROBE TECHNIQUE, MAKING USE OF HIGH THROUGHPUT TECHNOLOGIES AS DESCRIBED BY CMS-2020-01-R: HCPCS

## 2020-09-20 LAB — SARS-COV-2 RNA RESP QL NAA+PROBE: NOT DETECTED

## 2020-09-22 ENCOUNTER — HOSPITAL ENCOUNTER (OUTPATIENT)
Facility: HOSPITAL | Age: 55
Discharge: HOME | End: 2020-09-22
Attending: ORTHOPAEDIC SURGERY | Admitting: ORTHOPAEDIC SURGERY
Payer: COMMERCIAL

## 2020-09-22 ENCOUNTER — ANESTHESIA (OUTPATIENT)
Dept: SURGERY | Facility: HOSPITAL | Age: 55
End: 2020-09-22
Payer: COMMERCIAL

## 2020-09-22 VITALS
BODY MASS INDEX: 36.8 KG/M2 | DIASTOLIC BLOOD PRESSURE: 71 MMHG | HEART RATE: 85 BPM | SYSTOLIC BLOOD PRESSURE: 122 MMHG | HEIGHT: 62 IN | TEMPERATURE: 98 F | RESPIRATION RATE: 16 BRPM | OXYGEN SATURATION: 97 % | WEIGHT: 200 LBS

## 2020-09-22 DIAGNOSIS — M65.30 TRIGGER FINGER: ICD-10-CM

## 2020-09-22 DIAGNOSIS — M65.332 TRIGGER MIDDLE FINGER OF LEFT HAND: ICD-10-CM

## 2020-09-22 PROCEDURE — 26160 PR EXCIS TENDON SHEATH LESION, HAND/FINGER: ICD-10-PCS | Mod: F2,,, | Performed by: ORTHOPAEDIC SURGERY

## 2020-09-22 PROCEDURE — 26055 PR INCISE FINGER TENDON SHEATH: ICD-10-PCS | Mod: 59,51,F2, | Performed by: ORTHOPAEDIC SURGERY

## 2020-09-22 PROCEDURE — 63600175 PHARM REV CODE 636 W HCPCS: Performed by: NURSE ANESTHETIST, CERTIFIED REGISTERED

## 2020-09-22 PROCEDURE — S0020 INJECTION, BUPIVICAINE HYDRO: HCPCS | Performed by: ORTHOPAEDIC SURGERY

## 2020-09-22 PROCEDURE — 25000003 PHARM REV CODE 250: Performed by: ORTHOPAEDIC SURGERY

## 2020-09-22 PROCEDURE — 88304 TISSUE EXAM BY PATHOLOGIST: CPT | Mod: 26,,, | Performed by: PATHOLOGY

## 2020-09-22 PROCEDURE — 63600175 PHARM REV CODE 636 W HCPCS: Performed by: ORTHOPAEDIC SURGERY

## 2020-09-22 PROCEDURE — 88304 PR  SURG PATH,LEVEL III: ICD-10-PCS | Mod: 26,,, | Performed by: PATHOLOGY

## 2020-09-22 PROCEDURE — 26160 REMOVE TENDON SHEATH LESION: CPT | Mod: F2,,, | Performed by: ORTHOPAEDIC SURGERY

## 2020-09-22 PROCEDURE — 36000707: Performed by: ORTHOPAEDIC SURGERY

## 2020-09-22 PROCEDURE — 37000009 HC ANESTHESIA EA ADD 15 MINS: Performed by: ORTHOPAEDIC SURGERY

## 2020-09-22 PROCEDURE — 26055 INCISE FINGER TENDON SHEATH: CPT | Mod: 59,51,F2, | Performed by: ORTHOPAEDIC SURGERY

## 2020-09-22 PROCEDURE — 71000015 HC POSTOP RECOV 1ST HR: Performed by: ORTHOPAEDIC SURGERY

## 2020-09-22 PROCEDURE — 36000706: Performed by: ORTHOPAEDIC SURGERY

## 2020-09-22 PROCEDURE — 37000008 HC ANESTHESIA 1ST 15 MINUTES: Performed by: ORTHOPAEDIC SURGERY

## 2020-09-22 PROCEDURE — 88304 TISSUE EXAM BY PATHOLOGIST: CPT | Performed by: PATHOLOGY

## 2020-09-22 PROCEDURE — 71000016 HC POSTOP RECOV ADDL HR: Performed by: ORTHOPAEDIC SURGERY

## 2020-09-22 RX ORDER — MIDAZOLAM HYDROCHLORIDE 1 MG/ML
INJECTION, SOLUTION INTRAMUSCULAR; INTRAVENOUS
Status: DISCONTINUED | OUTPATIENT
Start: 2020-09-22 | End: 2020-09-22

## 2020-09-22 RX ORDER — BUPIVACAINE HYDROCHLORIDE 5 MG/ML
INJECTION, SOLUTION EPIDURAL; INTRACAUDAL
Status: DISCONTINUED | OUTPATIENT
Start: 2020-09-22 | End: 2020-09-22 | Stop reason: HOSPADM

## 2020-09-22 RX ORDER — LIDOCAINE HYDROCHLORIDE 10 MG/ML
INJECTION, SOLUTION EPIDURAL; INFILTRATION; INTRACAUDAL; PERINEURAL
Status: DISCONTINUED | OUTPATIENT
Start: 2020-09-22 | End: 2020-09-22 | Stop reason: HOSPADM

## 2020-09-22 RX ORDER — CEFAZOLIN SODIUM 2 G/50ML
2 SOLUTION INTRAVENOUS
Status: DISCONTINUED | OUTPATIENT
Start: 2020-09-22 | End: 2020-09-22 | Stop reason: HOSPADM

## 2020-09-22 RX ORDER — ONDANSETRON 8 MG/1
8 TABLET, ORALLY DISINTEGRATING ORAL EVERY 8 HOURS PRN
Status: DISCONTINUED | OUTPATIENT
Start: 2020-09-22 | End: 2020-09-22 | Stop reason: HOSPADM

## 2020-09-22 RX ORDER — LIDOCAINE HYDROCHLORIDE 20 MG/ML
INJECTION INTRAVENOUS
Status: DISCONTINUED | OUTPATIENT
Start: 2020-09-22 | End: 2020-09-22

## 2020-09-22 RX ORDER — HYDROCODONE BITARTRATE AND ACETAMINOPHEN 5; 325 MG/1; MG/1
1 TABLET ORAL EVERY 4 HOURS PRN
Qty: 20 TABLET | Refills: 0 | Status: SHIPPED | OUTPATIENT
Start: 2020-09-22 | End: 2020-09-28 | Stop reason: SDUPTHER

## 2020-09-22 RX ORDER — ACETAMINOPHEN 325 MG/1
650 TABLET ORAL EVERY 4 HOURS PRN
Status: DISCONTINUED | OUTPATIENT
Start: 2020-09-22 | End: 2020-09-22 | Stop reason: HOSPADM

## 2020-09-22 RX ORDER — FENTANYL CITRATE 50 UG/ML
INJECTION, SOLUTION INTRAMUSCULAR; INTRAVENOUS
Status: DISCONTINUED | OUTPATIENT
Start: 2020-09-22 | End: 2020-09-22

## 2020-09-22 RX ORDER — HYDROMORPHONE HYDROCHLORIDE 2 MG/ML
0.5 INJECTION, SOLUTION INTRAMUSCULAR; INTRAVENOUS; SUBCUTANEOUS EVERY 5 MIN PRN
Status: DISCONTINUED | OUTPATIENT
Start: 2020-09-22 | End: 2020-09-22 | Stop reason: HOSPADM

## 2020-09-22 RX ORDER — KETOROLAC TROMETHAMINE 30 MG/ML
30 INJECTION, SOLUTION INTRAMUSCULAR; INTRAVENOUS ONCE
Status: DISCONTINUED | OUTPATIENT
Start: 2020-09-22 | End: 2020-09-22 | Stop reason: HOSPADM

## 2020-09-22 RX ORDER — OXYCODONE HYDROCHLORIDE 5 MG/1
10 TABLET ORAL EVERY 4 HOURS PRN
Status: DISCONTINUED | OUTPATIENT
Start: 2020-09-22 | End: 2020-09-22 | Stop reason: HOSPADM

## 2020-09-22 RX ORDER — SODIUM CHLORIDE, SODIUM LACTATE, POTASSIUM CHLORIDE, CALCIUM CHLORIDE 600; 310; 30; 20 MG/100ML; MG/100ML; MG/100ML; MG/100ML
INJECTION, SOLUTION INTRAVENOUS CONTINUOUS PRN
Status: DISCONTINUED | OUTPATIENT
Start: 2020-09-22 | End: 2020-09-22

## 2020-09-22 RX ORDER — PROPOFOL 10 MG/ML
VIAL (ML) INTRAVENOUS CONTINUOUS PRN
Status: DISCONTINUED | OUTPATIENT
Start: 2020-09-22 | End: 2020-09-22

## 2020-09-22 RX ORDER — ONDANSETRON 2 MG/ML
4 INJECTION INTRAMUSCULAR; INTRAVENOUS ONCE AS NEEDED
Status: DISCONTINUED | OUTPATIENT
Start: 2020-09-22 | End: 2020-09-22 | Stop reason: HOSPADM

## 2020-09-22 RX ADMIN — FENTANYL CITRATE 25 MCG: 50 INJECTION, SOLUTION INTRAMUSCULAR; INTRAVENOUS at 02:09

## 2020-09-22 RX ADMIN — CEFAZOLIN SODIUM 2 G: 2 SOLUTION INTRAVENOUS at 02:09

## 2020-09-22 RX ADMIN — LIDOCAINE HYDROCHLORIDE 100 MG: 20 INJECTION, SOLUTION INTRAVENOUS at 02:09

## 2020-09-22 RX ADMIN — MIDAZOLAM 2 MG: 1 INJECTION INTRAMUSCULAR; INTRAVENOUS at 02:09

## 2020-09-22 RX ADMIN — SODIUM CHLORIDE, SODIUM LACTATE, POTASSIUM CHLORIDE, AND CALCIUM CHLORIDE: .6; .31; .03; .02 INJECTION, SOLUTION INTRAVENOUS at 02:09

## 2020-09-22 RX ADMIN — PROPOFOL 150 MCG/KG/MIN: 10 INJECTION, EMULSION INTRAVENOUS at 02:09

## 2020-09-22 NOTE — OP NOTE
Certification of Assistant at Surgery       Surgery Date: 9/22/2020     Participating Surgeons:  Surgeon(s) and Role:     * Ulysses Turner Jr., MD - Primary    Procedures:  Procedure(s) (LRB):  RELEASE, TRIGGER FINGER (Left)  EXCISION, GANGLION CYST, HAND (Left)    Assistant Surgeon's Certification of Necessity:  I understand that section 1842 (b) (6) (d) of the Social Security Act generally prohibits Medicare Part B reasonable charge payment for the services of assistants at surgery in teaching hospitals when qualified residents are available to furnish such services. I certify that the services for which payment is claimed were medically necessary, and that no qualified resident was available to perform the services. I further understand that these services are subject to post-payment review by the Medicare carrier.      Pancho Adam PA-C  **  09/22/2020  3:06 PM

## 2020-09-22 NOTE — ANESTHESIA POSTPROCEDURE EVALUATION
Anesthesia Post Evaluation    Patient: Judy Foley    Procedure(s) Performed: Procedure(s) (LRB):  RELEASE, TRIGGER FINGER (Left)  EXCISION, GANGLION CYST, HAND (Left)    Final Anesthesia Type: MAC    Patient location during evaluation: [facility]  Patient participation: Yes- Able to Participate  Level of consciousness: awake and alert and oriented  Post-procedure vital signs: reviewed and stable  Pain management: adequate  Airway patency: patent    PONV status at discharge: No PONV  Anesthetic complications: no      Cardiovascular status: blood pressure returned to baseline and hemodynamically stable  Respiratory status: unassisted, room air and spontaneous ventilation  Hydration status: euvolemic  Follow-up not needed.          Vitals Value Taken Time   /80 09/22/20 1508   Temp 36.6 °C (97.8 °F) 09/22/20 1508   Pulse 86 09/22/20 1508   Resp 18 09/22/20 1508   SpO2 98 % 09/22/20 1508         No case tracking events are documented in the log.      Pain/Paola Score: No data recorded

## 2020-09-22 NOTE — DISCHARGE INSTRUCTIONS
After Hand Surgery  After surgery, the better you take care of yourself--especially your hand--the sooner it will heal. Follow your surgeons instructions. Try not to bump your hand, and dont move or lift anything while youre still wearing bandages, a splint, or a cast.    Care for your hand    · Keep your hand elevated above heart level as much as possible for the first several days after surgery. This helps reduce swelling and pain.  · To help prevent infection and speed healing, take care not to get your cast or bandages wet.  ·   Relieve pain as directed  Your surgeon may prescribe pain medicine or suggest you take an anti-inflammatory medicine. You might also be instructed to apply ice (or another cold source) to your hand. If you use ice cubes, put them in a plastic bag and rest it on top of your bandages. Leave the cold source on your hand for as long as its comfortable. Do this several times a day for the first few days after surgery. It may take several minutes before you can feel the cold through the cast or bandages.    Follow up with your surgeon  During a follow-up visit after surgery, your surgeon will check your progress. The stitches, bandages, splint, or cast may be removed. A new cast or splint may be placed. If your hand has healed enough, your surgeon may prescribe exercises    Call your surgeon if you have...  · A fever higher than 100.4°F (38.0°C) taken by mouth  · Side effects from your medicine, such as prolonged nausea  · A wet or loose dressing, or a dressing that is too tight  · Excessive bleeding  · Increased, ongoing pain or numbness  · Signs of infection (such as drainage, warmth, or redness) at the incision site     ANESTHESIA  -For the first 24 hours after surgery:  Do not drive, use heavy equipment, make important decisions, or drink alcohol  -It is normal to feel sleepy for several hours.  Rest until you are more awake.  -Have someone stay with you, if needed.  They can watch  for problems and help keep you safe.  -Some possible post anesthesia side effects include: nausea and vomiting, sore throat and hoarseness, sleepiness, and dizziness.    PAIN  -If you have pain after surgery, pain medicine will help you feel better.  Take it as directed, before pain becomes severe.  Most pain relievers taken by mouth need at least 20-30 minutes to start working.  -Do not drive or drink alcohol while taking pain medicine.  -Pain medication can upset your stomach.  Taking them with a little food may help.  -Other ways to help control pain: elevation, ice, and relaxation  -Call your surgeon if still having unmanageable pain an hour after taking pain medicine.  -Pain medicine can cause constipation.  Taking an over-the counter stool softener while on prescription pain medicine and drinking plenty of fluids can prevent this side effect.  -Call your surgeon if you have severe side effects like: breathing problems, trouble waking up, dizziness, confusion, or severe constipation.    NAUSEA  -Some people have nausea after surgery.  This is often because of anesthesia, pain, pain medicine, or the stress of surgery.  -Do not push yourself to eat.  Start off with clear liquids and soup.  Slowly move to solid foods.  Don't eat fatty, rich, spicy foods at first.  Eat smaller amounts.  -If you develop persistent nausea and vomiting please notify your surgeon immediately.    BLEEDING  -Different types of surgery require different types of care and dressing changes.  It is important to follow all instructions and advice from your surgeon.  Change dressing as directed.  Call your surgeon for any concerns regarding postop bleeding.    SIGNS OF INFECTION  -Signs of infection include: fever, swelling, drainage, and redness  -Notify your surgeon if you have a fever of 100.4 F (38.0 C) or higher.  -Notify your surgeon if you notice redness, swelling, increased pain, pus, or a foul smell at the incision site.    Notify   Kiswahili for any problems or concerns.

## 2020-09-22 NOTE — TRANSFER OF CARE
"Anesthesia Transfer of Care Note    Patient: Judy Foley    Procedure(s) Performed: Procedure(s) (LRB):  RELEASE, TRIGGER FINGER (Left)  EXCISION, GANGLION CYST, HAND (Left)    Patient location: [facility]    Anesthesia Type: MAC    Transport from OR: Transported from OR on room air with adequate spontaneous ventilation    Post pain: adequate analgesia    Post assessment: no apparent anesthetic complications and tolerated procedure well    Post vital signs: stable    Level of consciousness: awake, alert and oriented    Nausea/Vomiting: no nausea/vomiting    Complications: none    Transfer of care protocol was followed      Last vitals:   Visit Vitals  /80 (BP Location: Right leg, Patient Position: Lying)   Pulse 86   Temp 36.6 °C (97.8 °F) (Tympanic)   Resp 18   Ht 5' 2" (1.575 m)   Wt 90.7 kg (200 lb)   SpO2 98%   BMI 36.58 kg/m²     "

## 2020-09-22 NOTE — ANESTHESIA POSTPROCEDURE EVALUATION
Anesthesia Post Evaluation    Patient: Judy Foley    Procedure(s) Performed: Procedure(s) (LRB):  RELEASE, TRIGGER FINGER (Left)  EXCISION, GANGLION CYST, HAND (Left)    Final Anesthesia Type: MAC    Patient location during evaluation: PACU  Patient participation: Yes- Able to Participate  Level of consciousness: awake and alert and oriented  Post-procedure vital signs: reviewed and stable  Pain management: adequate  Airway patency: patent    PONV status at discharge: No PONV  Anesthetic complications: no      Cardiovascular status: blood pressure returned to baseline and hemodynamically stable  Respiratory status: unassisted  Hydration status: euvolemic  Follow-up not needed.          Vitals Value Taken Time   /80 09/22/20 1508   Temp 36.6 °C (97.8 °F) 09/22/20 1508   Pulse 86 09/22/20 1508   Resp 18 09/22/20 1508   SpO2 98 % 09/22/20 1508         No case tracking events are documented in the log.      Pain/Paola Score: No data recorded

## 2020-09-22 NOTE — INTERVAL H&P NOTE
The patient has been examined and the H&P has been reviewed:    I concur with the findings and no changes have occurred since H&P was written.    Surgery risks, benefits and alternative options discussed and understood by patient/family.          Active Hospital Problems    Diagnosis  POA    Trigger finger [M65.30]  Yes      Resolved Hospital Problems   No resolved problems to display.

## 2020-09-22 NOTE — OP NOTE
Operative Note       Surgery Date: 9/22/2020     Surgeon(s) and Role:     * Ulysses Turner Jr., MD - Primary    Pre-op Diagnosis:  Trigger middle finger of left hand [M65.332]    Post-op Diagnosis: Post-Op Diagnosis Codes:     * Trigger middle finger of left hand [M65.332]    Procedure(s) (LRB):  RELEASE, TRIGGER FINGER (Left)  EXCISION, GANGLION CYST, HAND (Left)    Anesthesia: Local MAC    Procedure in Detail/Findings:  Preop diagnosis:  1.  Triggering left middle finger.    2.  Mucous cyst left middle finger dorsal    Postop diagnosis:  Same.    Operative procedure:  1.  Trigger release left middle finger.    2.  Excisional biopsy mucous cyst left middle finger DIP joint.    Surgeon:  Johnny Burt assistant:  Jair    Anesthesia:  Mac.    EBL:  Minimal.    Complications:  None.    Specimen:  Mucous cyst.    Operative procedure in detail as follows:    After operative consent was obtained the patient brought the operating room placed supine operating table.  Anesthesia by IV sedation followed by injection of xylocaine Marcaine combination into the left palm at the base of the middle finger.    Tourniquet applied left arm left upper extremity prepped and draped out in the normal sterile fashion.  The Esmarch used to exsanguinate and the tourniquet inflated 225 mm of mercury.    An oblique incision made in the distal palm at the base of the middle finger with 15 blade.  Careful dissection used to expose the A1 pulley neurovascular bundles protected the A1 pulley released longitudinally with the [place] blade and scissors.  The flexor tendons were checked noted to be intact with full range of motion without triggering.  The wound irrigated with antibiotic saline solution and closed with interrupted 5 O nylon horizontal mattress suture.    Attention then turned to the dorsal tip of the left middle finger wrist Chevron incision made around the DIP joint with 15 blade.  Full-thickness skin flaps raised and a cyst was  encountered noted to be arising from the DIP joint.  The cyst was carefully excised with its capsular attachment and there appeared to be a loose body within the joint directly underneath the cyst loose body was removed.  The joint debrided and irrigated thoroughly antibiotic saline solution and hemostasis achieved with the Bovie.  The wound closed with interrupted 5 O nylon horizontal mattress suture on the skin sterile dressing applied followed by light wrap for both incisions tourniquet then deflated patient brought to the recovery room in stable condition all sponge needle counts reported as correct no complications    Estimated Blood Loss: * No values recorded between 9/22/2020  2:34 PM and 9/22/2020  3:00 PM *           Specimens (From admission, onward)     Start     Ordered    09/22/20 1445  Specimen to Pathology, Surgery Orthopedics  Once     Question Answer Comment   Procedure Type: Orthopedics    Specimen Class: Routine/Screening        09/22/20 1445              Implants: * No implants in log *           Disposition: PACU - hemodynamically stable.           Condition: Good    Attestation:  I was present and scrubbed for the entire procedure.           Discharge Note    Admit Date: 9/22/2020    Attending Physician: Ulysses Turner Jr., MD     Discharge Physician: Ulysses Turner Jr., MD    Final Diagnosis: Post-Op Diagnosis Codes:     * Trigger middle finger of left hand [M65.332]    Disposition: Home or Self Care    Patient Instructions:   Current Discharge Medication List      START taking these medications    Details   HYDROcodone-acetaminophen (NORCO) 5-325 mg per tablet Take 1 tablet by mouth every 4 (four) hours as needed for Pain.  Qty: 20 tablet, Refills: 0    Comments: Quantity prescribed more than 7 day supply? No         CONTINUE these medications which have NOT CHANGED    Details   dexlansoprazole (DEXILANT) 60 mg capsule Take 1 capsule (60 mg total) by mouth once daily.  Qty: 90 capsule,  Refills: 3    Associated Diagnoses: Anxiety      escitalopram oxalate (LEXAPRO) 10 MG tablet Take 1 tablet (10 mg total) by mouth once daily.  Qty: 90 tablet, Refills: 3    Associated Diagnoses: Other migraine without status migrainosus, not intractable      metaxalone (SKELAXIN) 800 MG tablet       multivitamin capsule Take 1 capsule by mouth once daily.      tiZANidine (ZANAFLEX) 4 MG tablet       butalbital-acetaminophen-caffeine -40 mg (FIORICET, ESGIC) -40 mg per tablet TAKE 1 TABLET BY MOUTH EVERY 4 (FOUR) HOURS AS NEEDED FOR HEADACHES.      estradiol 1.25 gram/actuation topical gel Place 1.25 g onto the skin once daily.      sumatriptan (IMITREX) 25 MG Tab Take 1 tablet (25 mg total) by mouth daily as needed.  Qty: 90 tablet, Refills: 1             Discharge Procedure Orders (must include Diet, Follow-up, Activity)   Discharge Procedure Orders (must include Diet, Follow-up, Activity)   Diet general     Call MD for:  temperature >100.4     Call MD for:  persistent nausea and vomiting     Call MD for:  severe uncontrolled pain     Keep surgical extremity elevated     Remove dressing in 72 hours     Shower on day dressing removed (No bath)        Discharge Date: No discharge date for patient encounter.

## 2020-09-22 NOTE — ANESTHESIA PREPROCEDURE EVALUATION
09/22/2020  Judy Foley is a 54 y.o., female scheduled for trigger finger release and ganglion cyst excision of hand.    Anesthesia Evaluation    I have reviewed the Patient Summary Reports.    I have reviewed the Nursing Notes.       Review of Systems  Anesthesia Hx:  No problems with previous Anesthesia  History of prior surgery of interest to airway management or planning: Denies Family Hx of Anesthesia complications.   Denies Personal Hx of Anesthesia complications.   Hematology/Oncology:  Hematology Normal   Oncology Normal     EENT/Dental:EENT/Dental Normal   Cardiovascular:  Cardiovascular Normal     Pulmonary:  Pulmonary Normal    Renal/:  Renal/ Normal     Hepatic/GI:   GERD    Neurological:  Neurology Normal    Endocrine:  Endocrine Normal        Physical Exam  General:  Well nourished    Airway/Jaw/Neck:  Airway Findings: Mouth Opening: Normal Tongue: Normal  General Airway Assessment: Adult  Oropharynx Findings: Normal Mallampati: II  Improves to I with phonation.        Eyes/Ears/Nose:  EYES/EARS/NOSE FINDINGS: Normal   Dental:  DENTAL FINDINGS: Normal   Chest/Lungs:  Chest/Lungs Clear    Heart/Vascular:  Heart Findings: Normal Heart murmur: negative       Mental Status:  Mental Status Findings: Normal        Anesthesia Plan  Type of Anesthesia, risks & benefits discussed:  Anesthesia Type:  general  Patient's Preference:   Intra-op Monitoring Plan: standard ASA monitors  Intra-op Monitoring Plan Comments:   Post Op Pain Control Plan:   Post Op Pain Control Plan Comments:   Induction:   IV  Beta Blocker:  Patient is not currently on a Beta-Blocker (No further documentation required).       Informed Consent: Patient understands risks and agrees with Anesthesia plan.  Questions answered. Anesthesia consent signed with patient.  ASA Score: 2     Day of Surgery Review of History & Physical: I have  interviewed and examined the patient. I have reviewed the patient's H&P dated:  There are no significant changes.          Ready For Surgery From Anesthesia Perspective.

## 2020-09-27 ENCOUNTER — PATIENT MESSAGE (OUTPATIENT)
Dept: ORTHOPEDICS | Facility: CLINIC | Age: 55
End: 2020-09-27

## 2020-09-28 DIAGNOSIS — Z98.890 S/P TRIGGER FINGER RELEASE: Primary | ICD-10-CM

## 2020-09-28 LAB
FINAL PATHOLOGIC DIAGNOSIS: NORMAL
GROSS: NORMAL

## 2020-09-28 RX ORDER — HYDROCODONE BITARTRATE AND ACETAMINOPHEN 5; 325 MG/1; MG/1
1 TABLET ORAL EVERY 6 HOURS PRN
Qty: 40 TABLET | Refills: 0 | Status: SHIPPED | OUTPATIENT
Start: 2020-09-28 | End: 2020-10-05 | Stop reason: SDUPTHER

## 2020-10-05 ENCOUNTER — PATIENT MESSAGE (OUTPATIENT)
Dept: ORTHOPEDICS | Facility: CLINIC | Age: 55
End: 2020-10-05

## 2020-10-05 ENCOUNTER — OFFICE VISIT (OUTPATIENT)
Dept: ORTHOPEDICS | Facility: CLINIC | Age: 55
End: 2020-10-05
Payer: COMMERCIAL

## 2020-10-05 ENCOUNTER — PATIENT MESSAGE (OUTPATIENT)
Dept: INTERNAL MEDICINE | Facility: CLINIC | Age: 55
End: 2020-10-05

## 2020-10-05 VITALS — WEIGHT: 199.94 LBS | HEIGHT: 62 IN | BODY MASS INDEX: 36.79 KG/M2

## 2020-10-05 DIAGNOSIS — Z98.890 S/P TRIGGER FINGER RELEASE: ICD-10-CM

## 2020-10-05 PROCEDURE — 99024 PR POST-OP FOLLOW-UP VISIT: ICD-10-PCS | Mod: S$GLB,,, | Performed by: ORTHOPAEDIC SURGERY

## 2020-10-05 PROCEDURE — 99024 POSTOP FOLLOW-UP VISIT: CPT | Mod: S$GLB,,, | Performed by: ORTHOPAEDIC SURGERY

## 2020-10-05 PROCEDURE — 99999 PR PBB SHADOW E&M-EST. PATIENT-LVL III: CPT | Mod: PBBFAC,,, | Performed by: ORTHOPAEDIC SURGERY

## 2020-10-05 PROCEDURE — 99999 PR PBB SHADOW E&M-EST. PATIENT-LVL III: ICD-10-PCS | Mod: PBBFAC,,, | Performed by: ORTHOPAEDIC SURGERY

## 2020-10-06 RX ORDER — HYDROCODONE BITARTRATE AND ACETAMINOPHEN 5; 325 MG/1; MG/1
1 TABLET ORAL EVERY 6 HOURS PRN
Qty: 40 TABLET | Refills: 0 | Status: SHIPPED | OUTPATIENT
Start: 2020-10-06 | End: 2020-10-06 | Stop reason: SDUPTHER

## 2020-10-06 RX ORDER — HYDROCODONE BITARTRATE AND ACETAMINOPHEN 5; 325 MG/1; MG/1
1 TABLET ORAL EVERY 6 HOURS PRN
Qty: 40 TABLET | Refills: 0 | Status: SHIPPED | OUTPATIENT
Start: 2020-10-06 | End: 2020-10-19 | Stop reason: SDUPTHER

## 2020-10-06 NOTE — PROGRESS NOTES
"  Subjective:      Patient ID: Judy Foley is a 55 y.o. female.    Chief Complaint: Post-op Evaluation (2 wk s/p Left middle trigger, ganglion)      HPI: Judy Foley is here for a postop visit. she is 13 days s/p left middle trigger finger release and excision mucous cyst.  Pathology was consistent with mucous cyst; I went over these results with the patient.  Postoperatively she is doing well.  Pain is controlled with Norco. Triggering has resolved. Post surgical complaints include:  Soreness and stiffness.     Past Medical History:   Diagnosis Date    GERD (gastroesophageal reflux disease)        Current Outpatient Medications:     dexlansoprazole (DEXILANT) 60 mg capsule, Take 1 capsule (60 mg total) by mouth once daily., Disp: 90 capsule, Rfl: 3    escitalopram oxalate (LEXAPRO) 10 MG tablet, Take 1 tablet (10 mg total) by mouth once daily., Disp: 90 tablet, Rfl: 3    estradiol 1.25 gram/actuation topical gel, Place 1.25 g onto the skin once daily., Disp: , Rfl:     metaxalone (SKELAXIN) 800 MG tablet, , Disp: , Rfl:     multivitamin capsule, Take 1 capsule by mouth once daily., Disp: , Rfl:     sumatriptan (IMITREX) 25 MG Tab, TAKE 1 TABLET (25 MG TOTAL) BY MOUTH DAILY AS NEEDED., Disp: 90 tablet, Rfl: 0    tiZANidine (ZANAFLEX) 4 MG tablet, , Disp: , Rfl:     butalbital-acetaminophen-caffeine -40 mg (FIORICET, ESGIC) -40 mg per tablet, TAKE 1 TABLET BY MOUTH EVERY 4 (FOUR) HOURS AS NEEDED FOR HEADACHES., Disp: , Rfl:     HYDROcodone-acetaminophen (NORCO) 5-325 mg per tablet, Take 1 tablet by mouth every 6 (six) hours as needed for Pain., Disp: 40 tablet, Rfl: 0  Review of patient's allergies indicates:  No Known Allergies    Ht 5' 2" (1.575 m)   Wt 90.7 kg (199 lb 15.3 oz)   BMI 36.57 kg/m²     Review of Systems   Constitution: Negative for chills and fever.   Cardiovascular: Negative for chest pain and palpitations.   Respiratory: Negative for shortness of breath and wheezing.    Skin: " Negative for poor wound healing and rash.   Musculoskeletal: Positive for joint pain and joint swelling.   Gastrointestinal: Negative for nausea and vomiting.   Genitourinary: Negative for dysuria and hematuria.   Neurological: Negative for seizures and tremors.   Psychiatric/Behavioral: Negative for altered mental status.   Allergic/Immunologic: Negative for environmental allergies and persistent infections.         Objective:    Ortho Exam   Left hand:  Small incision over the middle finger A1 pulley with sutures in place. Wound margins are well approximated and healing nicely.  There is also a small incision near the PIP joint of the middle finger with sutures in place.  No redness, warmth, drainage, or other signs of infection. mild swelling. ROM wrist and fingers mostly full.   strength slightly decreased.  Sensation intact. Pulses present.    GEN: Well developed, well nourished female. AAOX3. No acute distress.   Breathing unlabored.  Mood and affect appropriate.         Assessment:     Imaging:  None        1. S/P trigger finger release          Plan:       Sutures removed today without complication.  Patient tolerated well.  Regular wound care explained with soap and water.  Start using hand for light activities.    Avoid pressure to the incisions.  Neosporin to the incision for the next few days.  Encouraged isolated ROM of the PIP joint to avoid stiffness.    Orders Placed This Encounter    HYDROcodone-acetaminophen (NORCO) 5-325 mg per tablet     Follow up in about 3 weeks (around 10/26/2020).

## 2020-10-18 ENCOUNTER — PATIENT MESSAGE (OUTPATIENT)
Dept: ORTHOPEDICS | Facility: CLINIC | Age: 55
End: 2020-10-18

## 2020-10-19 DIAGNOSIS — Z98.890 S/P TRIGGER FINGER RELEASE: ICD-10-CM

## 2020-10-19 RX ORDER — HYDROCODONE BITARTRATE AND ACETAMINOPHEN 5; 325 MG/1; MG/1
1 TABLET ORAL EVERY 6 HOURS PRN
Qty: 40 TABLET | Refills: 0 | Status: SHIPPED | OUTPATIENT
Start: 2020-10-19 | End: 2020-11-02 | Stop reason: SDUPTHER

## 2020-11-01 ENCOUNTER — PATIENT MESSAGE (OUTPATIENT)
Dept: ORTHOPEDICS | Facility: CLINIC | Age: 55
End: 2020-11-01

## 2020-11-02 DIAGNOSIS — Z98.890 S/P TRIGGER FINGER RELEASE: ICD-10-CM

## 2020-11-02 RX ORDER — HYDROCODONE BITARTRATE AND ACETAMINOPHEN 5; 325 MG/1; MG/1
1 TABLET ORAL EVERY 6 HOURS PRN
Qty: 40 TABLET | Refills: 0 | Status: SHIPPED | OUTPATIENT
Start: 2020-11-02 | End: 2022-03-03

## 2020-11-12 ENCOUNTER — OFFICE VISIT (OUTPATIENT)
Dept: ORTHOPEDICS | Facility: CLINIC | Age: 55
End: 2020-11-12
Payer: COMMERCIAL

## 2020-11-12 VITALS — WEIGHT: 199.94 LBS | BODY MASS INDEX: 36.79 KG/M2 | HEIGHT: 62 IN

## 2020-11-12 DIAGNOSIS — Z98.890 S/P TRIGGER FINGER RELEASE: Primary | ICD-10-CM

## 2020-11-12 DIAGNOSIS — M79.645 FINGER PAIN, LEFT: ICD-10-CM

## 2020-11-12 PROCEDURE — 99999 PR PBB SHADOW E&M-EST. PATIENT-LVL III: ICD-10-PCS | Mod: PBBFAC,,, | Performed by: ORTHOPAEDIC SURGERY

## 2020-11-12 PROCEDURE — 1125F PR PAIN SEVERITY QUANTIFIED, PAIN PRESENT: ICD-10-PCS | Mod: S$GLB,,, | Performed by: ORTHOPAEDIC SURGERY

## 2020-11-12 PROCEDURE — 3008F PR BODY MASS INDEX (BMI) DOCUMENTED: ICD-10-PCS | Mod: CPTII,S$GLB,, | Performed by: ORTHOPAEDIC SURGERY

## 2020-11-12 PROCEDURE — 99024 PR POST-OP FOLLOW-UP VISIT: ICD-10-PCS | Mod: S$GLB,,, | Performed by: ORTHOPAEDIC SURGERY

## 2020-11-12 PROCEDURE — 99999 PR PBB SHADOW E&M-EST. PATIENT-LVL III: CPT | Mod: PBBFAC,,, | Performed by: ORTHOPAEDIC SURGERY

## 2020-11-12 PROCEDURE — 1125F AMNT PAIN NOTED PAIN PRSNT: CPT | Mod: S$GLB,,, | Performed by: ORTHOPAEDIC SURGERY

## 2020-11-12 PROCEDURE — 20600 TENDON SHEATH: ICD-10-PCS | Mod: 79,F1,S$GLB, | Performed by: ORTHOPAEDIC SURGERY

## 2020-11-12 PROCEDURE — 3008F BODY MASS INDEX DOCD: CPT | Mod: CPTII,S$GLB,, | Performed by: ORTHOPAEDIC SURGERY

## 2020-11-12 PROCEDURE — 20600 DRAIN/INJ JOINT/BURSA W/O US: CPT | Mod: 79,F1,S$GLB, | Performed by: ORTHOPAEDIC SURGERY

## 2020-11-12 PROCEDURE — 99024 POSTOP FOLLOW-UP VISIT: CPT | Mod: S$GLB,,, | Performed by: ORTHOPAEDIC SURGERY

## 2020-11-12 RX ORDER — TRIAMCINOLONE ACETONIDE 40 MG/ML
40 INJECTION, SUSPENSION INTRA-ARTICULAR; INTRAMUSCULAR
Status: DISCONTINUED | OUTPATIENT
Start: 2020-11-12 | End: 2020-11-12 | Stop reason: HOSPADM

## 2020-11-12 RX ADMIN — TRIAMCINOLONE ACETONIDE 40 MG: 40 INJECTION, SUSPENSION INTRA-ARTICULAR; INTRAMUSCULAR at 11:11

## 2020-11-12 NOTE — PROCEDURES
Tendon Sheath    Date/Time: 11/12/2020 11:30 AM  Performed by: Rebecca Wilkinson PA-C  Authorized by: Rebecca Wilkinson PA-C     Medications:  40 mg triamcinolone acetonide 40 mg/mL    PROCEDURE:  I have explained the risks, benefits, and alternatives of the procedure in detail.  The patient voices understanding, gives consent, and all questions have been answered.  Pause for timeout. A sterile prep of the skin performed, then the left index  finger flexor tendon is injected using a 25 gauge needle with a combination of 0.5 cc 1% plain xylocaine and 20 mg of Kenalog.  The remaining 20 mg of Kenolog was properly wasted. The patient is cautioned and immediate relief of pain is secondary to the local anesthetic and will be temporary.  After the anesthetic wears off there may be a increase in pain that may last for a few hours or a few days and they should use ice to help alleviate this flair up of pain. Patient tolerated the procedure well.

## 2020-11-12 NOTE — PROGRESS NOTES
"  Subjective:      Patient ID: Judy Foley is a 55 y.o. female.    Chief Complaint: Post-op Evaluation (L trigger finger)      HPI: Judy Foley is here for a postop visit. she is 3 months s/p left middle trigger finger release and excision mucous cyst.  She is doing well. Stiffness resolved. Still some soreness at the incicions.  Today, she also complains of new onset "locking" of the DIP joints in the bilateral index fingers related to pinching activities.     Past Medical History:   Diagnosis Date    GERD (gastroesophageal reflux disease)        Current Outpatient Medications:     butalbital-acetaminophen-caffeine -40 mg (FIORICET, ESGIC) -40 mg per tablet, TAKE 1 TABLET BY MOUTH EVERY 4 (FOUR) HOURS AS NEEDED FOR HEADACHES., Disp: , Rfl:     dexlansoprazole (DEXILANT) 60 mg capsule, Take 1 capsule (60 mg total) by mouth once daily., Disp: 90 capsule, Rfl: 3    escitalopram oxalate (LEXAPRO) 10 MG tablet, Take 1 tablet (10 mg total) by mouth once daily., Disp: 90 tablet, Rfl: 3    estradiol 1.25 gram/actuation topical gel, Place 1.25 g onto the skin once daily., Disp: , Rfl:     HYDROcodone-acetaminophen (NORCO) 5-325 mg per tablet, Take 1 tablet by mouth every 6 (six) hours as needed for Pain., Disp: 40 tablet, Rfl: 0    metaxalone (SKELAXIN) 800 MG tablet, , Disp: , Rfl:     multivitamin capsule, Take 1 capsule by mouth once daily., Disp: , Rfl:     sumatriptan (IMITREX) 25 MG Tab, TAKE 1 TABLET (25 MG TOTAL) BY MOUTH DAILY AS NEEDED., Disp: 90 tablet, Rfl: 0    tiZANidine (ZANAFLEX) 4 MG tablet, , Disp: , Rfl:   Review of patient's allergies indicates:  No Known Allergies    Ht 5' 2" (1.575 m)   Wt 90.7 kg (199 lb 15.3 oz)   BMI 36.57 kg/m²     Review of Systems   Constitution: Negative for chills and fever.   Cardiovascular: Negative for chest pain and palpitations.   Respiratory: Negative for shortness of breath and wheezing.    Skin: Negative for poor wound healing and rash. "   Musculoskeletal: Positive for joint pain and joint swelling.   Gastrointestinal: Negative for nausea and vomiting.   Genitourinary: Negative for dysuria and hematuria.   Neurological: Negative for seizures and tremors.   Psychiatric/Behavioral: Negative for altered mental status.   Allergic/Immunologic: Negative for environmental allergies and persistent infections.         Objective:    Ortho Exam     Left hand:  Small incision over the middle finger A1 pulley well healed, slightly hypertrophic.There is also a small incision near the PIP joint of the middle finger well healed. Both midly tender.  ROM wrist and fingers mostly full.   strength slightly decreased.  Sensation intact. Pulses present.  No clicking or locking of the DIP joints today. No sign TTP.    GEN: Well developed, well nourished female. AAOX3. No acute distress.   Breathing unlabored.  Mood and affect appropriate.         Assessment:     Imaging:  None        1. S/P trigger finger release          Plan:       Activity as tolerated left hand  Continue scar massage to desensitize and soften incisions.  For the index finger, recommend trying and injection. If sx improve with injection will repeat on right index.        Follow up if symptoms worsen or fail to improve.

## 2020-12-20 ENCOUNTER — PATIENT MESSAGE (OUTPATIENT)
Dept: ORTHOPEDICS | Facility: CLINIC | Age: 55
End: 2020-12-20

## 2020-12-21 ENCOUNTER — PATIENT MESSAGE (OUTPATIENT)
Dept: ORTHOPEDICS | Facility: CLINIC | Age: 55
End: 2020-12-21

## 2021-01-04 ENCOUNTER — PATIENT MESSAGE (OUTPATIENT)
Dept: ADMINISTRATIVE | Facility: HOSPITAL | Age: 56
End: 2021-01-04

## 2021-01-04 ENCOUNTER — OFFICE VISIT (OUTPATIENT)
Dept: ORTHOPEDICS | Facility: CLINIC | Age: 56
End: 2021-01-04
Payer: COMMERCIAL

## 2021-01-04 VITALS
HEIGHT: 62 IN | WEIGHT: 199.94 LBS | BODY MASS INDEX: 36.79 KG/M2 | HEART RATE: 87 BPM | SYSTOLIC BLOOD PRESSURE: 125 MMHG | DIASTOLIC BLOOD PRESSURE: 81 MMHG

## 2021-01-04 DIAGNOSIS — Z98.890 S/P TRIGGER FINGER RELEASE: Primary | ICD-10-CM

## 2021-01-04 PROCEDURE — 99213 PR OFFICE/OUTPT VISIT, EST, LEVL III, 20-29 MIN: ICD-10-PCS | Mod: 25,S$GLB,, | Performed by: ORTHOPAEDIC SURGERY

## 2021-01-04 PROCEDURE — 3008F PR BODY MASS INDEX (BMI) DOCUMENTED: ICD-10-PCS | Mod: CPTII,S$GLB,, | Performed by: ORTHOPAEDIC SURGERY

## 2021-01-04 PROCEDURE — 1125F AMNT PAIN NOTED PAIN PRSNT: CPT | Mod: S$GLB,,, | Performed by: ORTHOPAEDIC SURGERY

## 2021-01-04 PROCEDURE — 3008F BODY MASS INDEX DOCD: CPT | Mod: CPTII,S$GLB,, | Performed by: ORTHOPAEDIC SURGERY

## 2021-01-04 PROCEDURE — 20550 PR INJECT TENDON SHEATH/LIGAMENT: ICD-10-PCS | Mod: F2,S$GLB,, | Performed by: ORTHOPAEDIC SURGERY

## 2021-01-04 PROCEDURE — 99999 PR PBB SHADOW E&M-EST. PATIENT-LVL III: ICD-10-PCS | Mod: PBBFAC,,, | Performed by: ORTHOPAEDIC SURGERY

## 2021-01-04 PROCEDURE — 1125F PR PAIN SEVERITY QUANTIFIED, PAIN PRESENT: ICD-10-PCS | Mod: S$GLB,,, | Performed by: ORTHOPAEDIC SURGERY

## 2021-01-04 PROCEDURE — 99999 PR PBB SHADOW E&M-EST. PATIENT-LVL III: CPT | Mod: PBBFAC,,, | Performed by: ORTHOPAEDIC SURGERY

## 2021-01-04 PROCEDURE — 99213 OFFICE O/P EST LOW 20 MIN: CPT | Mod: 25,S$GLB,, | Performed by: ORTHOPAEDIC SURGERY

## 2021-01-04 PROCEDURE — 20550 NJX 1 TENDON SHEATH/LIGAMENT: CPT | Mod: F2,S$GLB,, | Performed by: ORTHOPAEDIC SURGERY

## 2021-01-04 RX ORDER — TRIAMCINOLONE ACETONIDE 40 MG/ML
40 INJECTION, SUSPENSION INTRA-ARTICULAR; INTRAMUSCULAR
Status: DISCONTINUED | OUTPATIENT
Start: 2021-01-04 | End: 2021-01-04 | Stop reason: HOSPADM

## 2021-01-04 RX ADMIN — TRIAMCINOLONE ACETONIDE 40 MG: 40 INJECTION, SUSPENSION INTRA-ARTICULAR; INTRAMUSCULAR at 09:01

## 2021-03-31 RX ORDER — SUMATRIPTAN SUCCINATE 25 MG/1
25 TABLET ORAL DAILY PRN
Qty: 9 TABLET | Refills: 4 | Status: SHIPPED | OUTPATIENT
Start: 2021-03-31 | End: 2021-06-01

## 2021-04-05 ENCOUNTER — PATIENT MESSAGE (OUTPATIENT)
Dept: ADMINISTRATIVE | Facility: HOSPITAL | Age: 56
End: 2021-04-05

## 2021-05-18 DIAGNOSIS — F41.9 ANXIETY: ICD-10-CM

## 2021-05-18 RX ORDER — DEXLANSOPRAZOLE 60 MG/1
60 CAPSULE, DELAYED RELEASE ORAL DAILY
Qty: 90 CAPSULE | Refills: 3 | Status: SHIPPED | OUTPATIENT
Start: 2021-05-18 | End: 2021-08-09 | Stop reason: SDUPTHER

## 2021-07-07 ENCOUNTER — PATIENT MESSAGE (OUTPATIENT)
Dept: ADMINISTRATIVE | Facility: HOSPITAL | Age: 56
End: 2021-07-07

## 2021-08-09 ENCOUNTER — PATIENT MESSAGE (OUTPATIENT)
Dept: ORTHOPEDICS | Facility: CLINIC | Age: 56
End: 2021-08-09

## 2021-08-09 ENCOUNTER — PATIENT MESSAGE (OUTPATIENT)
Dept: FAMILY MEDICINE | Facility: CLINIC | Age: 56
End: 2021-08-09

## 2021-08-09 DIAGNOSIS — G43.809 OTHER MIGRAINE WITHOUT STATUS MIGRAINOSUS, NOT INTRACTABLE: ICD-10-CM

## 2021-08-09 DIAGNOSIS — F41.9 ANXIETY: ICD-10-CM

## 2021-08-09 RX ORDER — ESCITALOPRAM OXALATE 10 MG/1
10 TABLET ORAL DAILY
Qty: 90 TABLET | Refills: 3 | Status: CANCELLED | OUTPATIENT
Start: 2021-08-09

## 2021-08-09 RX ORDER — DEXLANSOPRAZOLE 60 MG/1
60 CAPSULE, DELAYED RELEASE ORAL DAILY
Qty: 90 CAPSULE | Refills: 3 | Status: SHIPPED | OUTPATIENT
Start: 2021-08-09 | End: 2022-03-03 | Stop reason: SDUPTHER

## 2021-08-09 RX ORDER — ESCITALOPRAM OXALATE 10 MG/1
10 TABLET ORAL DAILY
Qty: 90 TABLET | Refills: 3 | Status: SHIPPED | OUTPATIENT
Start: 2021-08-09 | End: 2021-08-11 | Stop reason: SDUPTHER

## 2021-08-09 RX ORDER — DEXLANSOPRAZOLE 60 MG/1
60 CAPSULE, DELAYED RELEASE ORAL DAILY
Qty: 90 CAPSULE | Refills: 3 | Status: CANCELLED | OUTPATIENT
Start: 2021-08-09 | End: 2022-08-09

## 2021-08-11 ENCOUNTER — PATIENT MESSAGE (OUTPATIENT)
Dept: FAMILY MEDICINE | Facility: CLINIC | Age: 56
End: 2021-08-11

## 2021-08-11 ENCOUNTER — TELEPHONE (OUTPATIENT)
Dept: ORTHOPEDICS | Facility: CLINIC | Age: 56
End: 2021-08-11

## 2021-08-11 DIAGNOSIS — G43.809 OTHER MIGRAINE WITHOUT STATUS MIGRAINOSUS, NOT INTRACTABLE: ICD-10-CM

## 2021-08-11 DIAGNOSIS — M79.642 LEFT HAND PAIN: Primary | ICD-10-CM

## 2021-08-11 RX ORDER — ESCITALOPRAM OXALATE 10 MG/1
10 TABLET ORAL DAILY
Qty: 90 TABLET | Refills: 3 | Status: SHIPPED | OUTPATIENT
Start: 2021-08-11 | End: 2022-03-03 | Stop reason: SDUPTHER

## 2021-08-12 ENCOUNTER — OFFICE VISIT (OUTPATIENT)
Dept: ORTHOPEDICS | Facility: CLINIC | Age: 56
End: 2021-08-12
Payer: COMMERCIAL

## 2021-08-12 ENCOUNTER — PATIENT MESSAGE (OUTPATIENT)
Dept: ORTHOPEDICS | Facility: CLINIC | Age: 56
End: 2021-08-12

## 2021-08-12 ENCOUNTER — HOSPITAL ENCOUNTER (OUTPATIENT)
Dept: RADIOLOGY | Facility: HOSPITAL | Age: 56
Discharge: HOME | End: 2021-08-12
Attending: PHYSICIAN ASSISTANT
Payer: COMMERCIAL

## 2021-08-12 VITALS — BODY MASS INDEX: 36.79 KG/M2 | HEIGHT: 62 IN | WEIGHT: 199.94 LBS

## 2021-08-12 DIAGNOSIS — Z98.890 S/P TRIGGER FINGER RELEASE: Primary | ICD-10-CM

## 2021-08-12 DIAGNOSIS — M79.642 LEFT HAND PAIN: ICD-10-CM

## 2021-08-12 PROCEDURE — 1125F AMNT PAIN NOTED PAIN PRSNT: CPT | Mod: CPTII,S$GLB,, | Performed by: PHYSICIAN ASSISTANT

## 2021-08-12 PROCEDURE — 1159F MED LIST DOCD IN RCRD: CPT | Mod: CPTII,S$GLB,, | Performed by: PHYSICIAN ASSISTANT

## 2021-08-12 PROCEDURE — 73130 XR HAND COMPLETE 3 VIEW LEFT: ICD-10-PCS | Mod: 26,LT,, | Performed by: RADIOLOGY

## 2021-08-12 PROCEDURE — 99213 PR OFFICE/OUTPT VISIT, EST, LEVL III, 20-29 MIN: ICD-10-PCS | Mod: S$GLB,,, | Performed by: PHYSICIAN ASSISTANT

## 2021-08-12 PROCEDURE — 99213 OFFICE O/P EST LOW 20 MIN: CPT | Mod: S$GLB,,, | Performed by: PHYSICIAN ASSISTANT

## 2021-08-12 PROCEDURE — 1159F PR MEDICATION LIST DOCUMENTED IN MEDICAL RECORD: ICD-10-PCS | Mod: CPTII,S$GLB,, | Performed by: PHYSICIAN ASSISTANT

## 2021-08-12 PROCEDURE — 73130 X-RAY EXAM OF HAND: CPT | Mod: TC,PN,LT

## 2021-08-12 PROCEDURE — 99999 PR PBB SHADOW E&M-EST. PATIENT-LVL III: ICD-10-PCS | Mod: PBBFAC,,, | Performed by: PHYSICIAN ASSISTANT

## 2021-08-12 PROCEDURE — 99999 PR PBB SHADOW E&M-EST. PATIENT-LVL III: CPT | Mod: PBBFAC,,, | Performed by: PHYSICIAN ASSISTANT

## 2021-08-12 PROCEDURE — 73130 X-RAY EXAM OF HAND: CPT | Mod: 26,LT,, | Performed by: RADIOLOGY

## 2021-08-12 PROCEDURE — 3008F PR BODY MASS INDEX (BMI) DOCUMENTED: ICD-10-PCS | Mod: CPTII,S$GLB,, | Performed by: PHYSICIAN ASSISTANT

## 2021-08-12 PROCEDURE — 1160F PR REVIEW ALL MEDS BY PRESCRIBER/CLIN PHARMACIST DOCUMENTED: ICD-10-PCS | Mod: CPTII,S$GLB,, | Performed by: PHYSICIAN ASSISTANT

## 2021-08-12 PROCEDURE — 3008F BODY MASS INDEX DOCD: CPT | Mod: CPTII,S$GLB,, | Performed by: PHYSICIAN ASSISTANT

## 2021-08-12 PROCEDURE — 1125F PR PAIN SEVERITY QUANTIFIED, PAIN PRESENT: ICD-10-PCS | Mod: CPTII,S$GLB,, | Performed by: PHYSICIAN ASSISTANT

## 2021-08-12 PROCEDURE — 1160F RVW MEDS BY RX/DR IN RCRD: CPT | Mod: CPTII,S$GLB,, | Performed by: PHYSICIAN ASSISTANT

## 2021-08-12 RX ORDER — DICLOFENAC SODIUM 10 MG/G
2 GEL TOPICAL 4 TIMES DAILY
Qty: 1 TUBE | Refills: 2 | Status: SHIPPED | OUTPATIENT
Start: 2021-08-12

## 2021-09-15 ENCOUNTER — PATIENT MESSAGE (OUTPATIENT)
Dept: ORTHOPEDICS | Facility: CLINIC | Age: 56
End: 2021-09-15

## 2021-09-22 DIAGNOSIS — Z12.31 OTHER SCREENING MAMMOGRAM: ICD-10-CM

## 2021-10-05 ENCOUNTER — PATIENT MESSAGE (OUTPATIENT)
Dept: ADMINISTRATIVE | Facility: HOSPITAL | Age: 56
End: 2021-10-05

## 2021-12-17 DIAGNOSIS — G43.809 OTHER MIGRAINE, NOT INTRACTABLE, WITHOUT STATUS MIGRAINOSUS: ICD-10-CM

## 2021-12-17 RX ORDER — BUTALBITAL, ACETAMINOPHEN AND CAFFEINE 50; 325; 40 MG/1; MG/1; MG/1
TABLET ORAL
Qty: 30 TABLET | Refills: 2 | Status: SHIPPED | OUTPATIENT
Start: 2021-12-17 | End: 2021-12-17 | Stop reason: SDUPTHER

## 2021-12-19 RX ORDER — BUTALBITAL, ACETAMINOPHEN AND CAFFEINE 50; 325; 40 MG/1; MG/1; MG/1
1 TABLET ORAL EVERY 6 HOURS PRN
Qty: 30 TABLET | Refills: 2 | Status: SHIPPED | OUTPATIENT
Start: 2021-12-19 | End: 2022-01-09 | Stop reason: SDUPTHER

## 2022-01-08 DIAGNOSIS — G43.809 OTHER MIGRAINE, NOT INTRACTABLE, WITHOUT STATUS MIGRAINOSUS: ICD-10-CM

## 2022-01-08 NOTE — TELEPHONE ENCOUNTER
No new care gaps identified.  Powered by [employer]. Reference number: [id number].   1/08/2022 10:52:52 AM CST

## 2022-01-09 RX ORDER — BUTALBITAL, ACETAMINOPHEN AND CAFFEINE 50; 325; 40 MG/1; MG/1; MG/1
TABLET ORAL
Qty: 30 TABLET | Refills: 2 | Status: SHIPPED | OUTPATIENT
Start: 2022-01-09 | End: 2022-01-31

## 2022-01-09 RX ORDER — SUMATRIPTAN SUCCINATE 25 MG/1
TABLET ORAL
Qty: 9 TABLET | Refills: 4 | Status: SHIPPED | OUTPATIENT
Start: 2022-01-09 | End: 2022-09-19 | Stop reason: SDUPTHER

## 2022-01-10 ENCOUNTER — PATIENT MESSAGE (OUTPATIENT)
Dept: ADMINISTRATIVE | Facility: HOSPITAL | Age: 57
End: 2022-01-10
Payer: COMMERCIAL

## 2022-01-12 ENCOUNTER — TELEPHONE (OUTPATIENT)
Dept: ADMINISTRATIVE | Facility: HOSPITAL | Age: 57
End: 2022-01-12
Payer: COMMERCIAL

## 2022-01-14 ENCOUNTER — PATIENT OUTREACH (OUTPATIENT)
Dept: ADMINISTRATIVE | Facility: HOSPITAL | Age: 57
End: 2022-01-14
Payer: COMMERCIAL

## 2022-01-14 ENCOUNTER — TELEPHONE (OUTPATIENT)
Dept: ADMINISTRATIVE | Facility: HOSPITAL | Age: 57
End: 2022-01-14
Payer: COMMERCIAL

## 2022-01-30 DIAGNOSIS — G43.809 OTHER MIGRAINE, NOT INTRACTABLE, WITHOUT STATUS MIGRAINOSUS: ICD-10-CM

## 2022-01-31 RX ORDER — BUTALBITAL, ACETAMINOPHEN AND CAFFEINE 50; 325; 40 MG/1; MG/1; MG/1
TABLET ORAL
Qty: 30 TABLET | Refills: 2 | Status: SHIPPED | OUTPATIENT
Start: 2022-01-31 | End: 2022-02-15

## 2022-01-31 NOTE — TELEPHONE ENCOUNTER
No new care gaps identified.  Powered by [employer]. Reference number: [id number].   1/30/2022 11:50:45 PM CST

## 2022-03-03 ENCOUNTER — OFFICE VISIT (OUTPATIENT)
Dept: INTERNAL MEDICINE | Facility: CLINIC | Age: 57
End: 2022-03-03
Payer: COMMERCIAL

## 2022-03-03 VITALS
SYSTOLIC BLOOD PRESSURE: 122 MMHG | WEIGHT: 180.75 LBS | OXYGEN SATURATION: 97 % | TEMPERATURE: 98 F | BODY MASS INDEX: 33.06 KG/M2 | DIASTOLIC BLOOD PRESSURE: 82 MMHG | HEART RATE: 55 BPM

## 2022-03-03 DIAGNOSIS — F41.9 ANXIETY: ICD-10-CM

## 2022-03-03 DIAGNOSIS — G43.809 OTHER MIGRAINE WITHOUT STATUS MIGRAINOSUS, NOT INTRACTABLE: ICD-10-CM

## 2022-03-03 DIAGNOSIS — Z00.00 ANNUAL PHYSICAL EXAM: Primary | ICD-10-CM

## 2022-03-03 DIAGNOSIS — N95.9 MENOPAUSAL DISORDER: ICD-10-CM

## 2022-03-03 DIAGNOSIS — E66.09 CLASS 1 OBESITY DUE TO EXCESS CALORIES WITHOUT SERIOUS COMORBIDITY WITH BODY MASS INDEX (BMI) OF 33.0 TO 33.9 IN ADULT: ICD-10-CM

## 2022-03-03 DIAGNOSIS — Z11.59 SCREENING FOR VIRAL DISEASE: ICD-10-CM

## 2022-03-03 DIAGNOSIS — G43.009 MIGRAINE WITHOUT AURA AND WITHOUT STATUS MIGRAINOSUS, NOT INTRACTABLE: ICD-10-CM

## 2022-03-03 DIAGNOSIS — Z23 NEED FOR INFLUENZA VACCINATION: ICD-10-CM

## 2022-03-03 DIAGNOSIS — K21.9 GASTROESOPHAGEAL REFLUX DISEASE WITHOUT ESOPHAGITIS: ICD-10-CM

## 2022-03-03 PROBLEM — M65.30 TRIGGER FINGER: Status: RESOLVED | Noted: 2020-09-22 | Resolved: 2022-03-03

## 2022-03-03 PROBLEM — E66.811 CLASS 1 OBESITY DUE TO EXCESS CALORIES WITHOUT SERIOUS COMORBIDITY WITH BODY MASS INDEX (BMI) OF 33.0 TO 33.9 IN ADULT: Status: ACTIVE | Noted: 2022-03-03

## 2022-03-03 PROCEDURE — 3008F BODY MASS INDEX DOCD: CPT | Mod: CPTII,S$GLB,, | Performed by: INTERNAL MEDICINE

## 2022-03-03 PROCEDURE — 3079F PR MOST RECENT DIASTOLIC BLOOD PRESSURE 80-89 MM HG: ICD-10-PCS | Mod: CPTII,S$GLB,, | Performed by: INTERNAL MEDICINE

## 2022-03-03 PROCEDURE — 3079F DIAST BP 80-89 MM HG: CPT | Mod: CPTII,S$GLB,, | Performed by: INTERNAL MEDICINE

## 2022-03-03 PROCEDURE — 99999 PR PBB SHADOW E&M-EST. PATIENT-LVL IV: CPT | Mod: PBBFAC,,, | Performed by: INTERNAL MEDICINE

## 2022-03-03 PROCEDURE — 90686 IIV4 VACC NO PRSV 0.5 ML IM: CPT | Mod: S$GLB,,, | Performed by: INTERNAL MEDICINE

## 2022-03-03 PROCEDURE — 90471 IMMUNIZATION ADMIN: CPT | Mod: S$GLB,,, | Performed by: INTERNAL MEDICINE

## 2022-03-03 PROCEDURE — 90686 FLU VACCINE (QUAD) GREATER THAN OR EQUAL TO 3YO PRESERVATIVE FREE IM: ICD-10-PCS | Mod: S$GLB,,, | Performed by: INTERNAL MEDICINE

## 2022-03-03 PROCEDURE — 99999 PR PBB SHADOW E&M-EST. PATIENT-LVL IV: ICD-10-PCS | Mod: PBBFAC,,, | Performed by: INTERNAL MEDICINE

## 2022-03-03 PROCEDURE — 90471 FLU VACCINE (QUAD) GREATER THAN OR EQUAL TO 3YO PRESERVATIVE FREE IM: ICD-10-PCS | Mod: S$GLB,,, | Performed by: INTERNAL MEDICINE

## 2022-03-03 PROCEDURE — 3074F SYST BP LT 130 MM HG: CPT | Mod: CPTII,S$GLB,, | Performed by: INTERNAL MEDICINE

## 2022-03-03 PROCEDURE — 1159F PR MEDICATION LIST DOCUMENTED IN MEDICAL RECORD: ICD-10-PCS | Mod: CPTII,S$GLB,, | Performed by: INTERNAL MEDICINE

## 2022-03-03 PROCEDURE — 3074F PR MOST RECENT SYSTOLIC BLOOD PRESSURE < 130 MM HG: ICD-10-PCS | Mod: CPTII,S$GLB,, | Performed by: INTERNAL MEDICINE

## 2022-03-03 PROCEDURE — 99396 PREV VISIT EST AGE 40-64: CPT | Mod: 25,S$GLB,, | Performed by: INTERNAL MEDICINE

## 2022-03-03 PROCEDURE — 99396 PR PREVENTIVE VISIT,EST,40-64: ICD-10-PCS | Mod: 25,S$GLB,, | Performed by: INTERNAL MEDICINE

## 2022-03-03 PROCEDURE — 1159F MED LIST DOCD IN RCRD: CPT | Mod: CPTII,S$GLB,, | Performed by: INTERNAL MEDICINE

## 2022-03-03 PROCEDURE — 3008F PR BODY MASS INDEX (BMI) DOCUMENTED: ICD-10-PCS | Mod: CPTII,S$GLB,, | Performed by: INTERNAL MEDICINE

## 2022-03-03 RX ORDER — DEXLANSOPRAZOLE 60 MG/1
60 CAPSULE, DELAYED RELEASE ORAL DAILY
Qty: 90 CAPSULE | Refills: 3 | Status: SHIPPED | OUTPATIENT
Start: 2022-03-03 | End: 2023-04-13

## 2022-03-03 RX ORDER — ESCITALOPRAM OXALATE 10 MG/1
10 TABLET ORAL DAILY
Qty: 90 TABLET | Refills: 3 | Status: SHIPPED | OUTPATIENT
Start: 2022-03-03 | End: 2022-05-17 | Stop reason: SDUPTHER

## 2022-03-03 NOTE — ASSESSMENT & PLAN NOTE
Was walking in a lot parades as chaperone for daughter.  Now staying in  in [place] with daughter during the week.  Has been trying to walk around campground.

## 2022-03-03 NOTE — PROGRESS NOTES
[facility] Note    Chief Complaint      Chief Complaint   Patient presents with    Annual Exam     History of Present Illness      Judy Foley is a 56 y.o. female who presents today for annual preventative visit.  Patient comes to appointment alone.  GI: Kyleggegrald, GYN: Mary    Problem List Items Addressed This Visit     Gastroesophageal reflux disease without esophagitis    Current Assessment & Plan     Stable on dexilant, works well for her.  No reflux/nausea/bloating.  Had EGD with Dr. Geronimo in past, had repair of hiatal hernia in past.  Trying to eat more healthy.           Migraine without status migrainosus, not intractable    Current Assessment & Plan     Stable on imitrexor or fioricet PRN, still works when she gets one.  Gets bad migraine once per month.  Most headaches are more mild.           Relevant Medications    EScitalopram oxalate (LEXAPRO) 10 MG tablet    Menopausal disorder    Current Assessment & Plan     Stable on topical estradiol, works well.  Sees GYN.           Anxiety    Current Assessment & Plan     Stable on lexapro 10 mg daily, no SI/HI/panic attacks.  Sleeping really well.           Relevant Medications    dexlansoprazole (DEXILANT) 60 mg capsule    Class 1 obesity due to excess calories without serious comorbidity with body mass index (BMI) of 33.0 to 33.9 in adult    Current Assessment & Plan     [place].  Now staying in  in [place] with daughter during the week.  Has been trying to walk around campground.             Other Visit Diagnoses     Annual physical exam    -  Primary    Relevant Orders    CBC Auto Differential    Lipid Panel    Comprehensive Metabolic Panel    TSH    Need for influenza vaccination        Relevant Orders    Influenza - Quadrivalent *Preferred* (6 months+) (PF)    Screening for viral disease        Relevant Orders    Hepatitis C Antibody          Health Maintenance   Topic Date Due     Hepatitis C Screening  Never done    Mammogram  11/22/2022    Lipid Panel  07/07/2025    TETANUS VACCINE  12/19/2028       Past Medical History:   Diagnosis Date    GERD (gastroesophageal reflux disease)        Past Surgical History:   Procedure Laterality Date    BUNIONECTOMY      CHOLECYSTECTOMY      colonoscopy  12/18/2017    Normal    EXCISION OF GANGLION CYST OF HAND Left 09/22/2020    Procedure: EXCISION, GANGLION CYST, HAND;  Surgeon: Ulysses Turner Jr., MD;  Location: [facility] OR;  Service: Orthopedics;  Laterality: Left;    HERNIA REPAIR      HYSTERECTOMY      ROTATOR CUFF REPAIR Left     TRIGGER FINGER RELEASE Left 09/22/2020    Procedure: RELEASE, TRIGGER FINGER;  Surgeon: Ulysses Turner Jr., MD;  Location: [facility] OR;  Service: Orthopedics;  Laterality: Left;       family history includes Arthritis in her mother; Cancer in her mother; Heart disease in her daughter, daughter, and father; Mental retardation in her daughter; Pulmonary fibrosis in her mother.    Social History     Tobacco Use    Smoking status: Never Smoker    Smokeless tobacco: Never Used   Substance Use Topics    Alcohol use: No    Drug use: Never       Review of Systems   Constitutional: Negative for chills and fever.   HENT: Negative for hearing loss and sore throat.    Eyes: Negative for discharge.   Respiratory: Negative for cough, shortness of breath and wheezing.    Cardiovascular: Negative for chest pain and palpitations.   Gastrointestinal: Negative for blood in stool, constipation, diarrhea, nausea and vomiting.   Genitourinary: Negative for dysuria and hematuria.   Musculoskeletal: Negative for falls and neck pain.   Neurological: Positive for headaches. Negative for weakness.   Endo/Heme/Allergies: Negative for polydipsia.        Outpatient Encounter Medications as of 3/3/2022   Medication Sig Dispense Refill    butalbital-acetaminophen-caffeine -40 mg (FIORICET, ESGIC) -40 mg per tablet TAKE 1 TABLET  BY MOUTH EVERY 6 HOURS AS NEEDED FOR PAIN 30 tablet 2    diclofenac sodium (VOLTAREN) 1 % Gel Apply 2 g topically 4 (four) times daily. 1 Tube 2    estradiol 1.25 gram/actuation topical gel Place 1.25 g onto the skin once daily.      multivitamin capsule Take 1 capsule by mouth once daily.      sumatriptan (IMITREX) 25 MG Tab TAKE 1 TABLET BY MOUTH ONCE DAILY AS NEEDED MIGRAINE 9 tablet 4    [DISCONTINUED] dexlansoprazole (DEXILANT) 60 mg capsule Take 1 capsule (60 mg total) by mouth once daily. 90 capsule 3    [DISCONTINUED] EScitalopram oxalate (LEXAPRO) 10 MG tablet Take 1 tablet (10 mg total) by mouth once daily. 90 tablet 3    dexlansoprazole (DEXILANT) 60 mg capsule Take 1 capsule (60 mg total) by mouth once daily. 90 capsule 3    EScitalopram oxalate (LEXAPRO) 10 MG tablet Take 1 tablet (10 mg total) by mouth once daily. 90 tablet 3    [DISCONTINUED] HYDROcodone-acetaminophen (NORCO) 5-325 mg per tablet Take 1 tablet by mouth every 6 (six) hours as needed for Pain. (Patient not taking: Reported on 3/3/2022) 40 tablet 0    [DISCONTINUED] metaxalone (SKELAXIN) 800 MG tablet       [DISCONTINUED] tiZANidine (ZANAFLEX) 4 MG tablet        No facility-administered encounter medications on file as of 3/3/2022.        Review of patient's allergies indicates:  No Known Allergies    Physical Exam      Vital Signs  Temp: 98.3 °F (36.8 °C)  Pulse: (!) 55  SpO2: 97 %  BP: 122/82  Pain Score: 0-No pain  Height and Weight  Weight: 82 kg (180 lb 12.4 oz)]    Physical Exam  Constitutional:       Appearance: She is well-developed.   HENT:      Head: Normocephalic and atraumatic.      Right Ear: External ear normal.      Left Ear: External ear normal.   Eyes:      General:         Right eye: No discharge.         Left eye: No discharge.   Cardiovascular:      Rate and Rhythm: Normal rate and regular rhythm.      Heart sounds: Normal heart sounds. No murmur heard.  Pulmonary:      Effort: Pulmonary effort is normal. No  respiratory distress.      Breath sounds: Normal breath sounds.   Abdominal:      General: There is no distension.      Palpations: Abdomen is soft.      Tenderness: There is no abdominal tenderness. There is no guarding.   Musculoskeletal:         General: Normal range of motion.      Cervical back: Normal range of motion.   Skin:     General: Skin is warm and dry.   Neurological:      Mental Status: She is alert and oriented to person, place, and time.   Psychiatric:         Behavior: Behavior normal.          Laboratory:  CBC:  No results for input(s): WBC, RBC, HGB, HCT, PLT, MCV, MCH, MCHC in the last 2160 hours.  CMP:  No results for input(s): GLU, CALCIUM, ALBUMIN, PROT, NA, K, CO2, CL, BUN, ALKPHOS, ALT, AST, BILITOT in the last 2160 hours.    Invalid input(s): CREATININ  URINALYSIS:  No results for input(s): COLORU, CLARITYU, SPECGRAV, PHUR, PROTEINUA, GLUCOSEU, BILIRUBINCON, BLOODU, WBCU, RBCU, BACTERIA, MUCUS, NITRITE, LEUKOCYTESUR, UROBILINOGEN, HYALINECASTS in the last 2160 hours.   LIPIDS:  No results for input(s): TSH, HDL, CHOL, TRIG, LDLCALC, CHOLHDL, NONHDLCHOL, TOTALCHOLEST in the last 2160 hours.  TSH:  No results for input(s): TSH in the last 2160 hours.  A1C:  No results for input(s): HGBA1C in the last 2160 hours.    Radiology:  No results found in the last 30 days.     Assessment/Plan     Judy Foley is a 56 y.o.female with:    1. Annual physical exam  - CBC Auto Differential; Future  - Lipid Panel; Future  - Comprehensive Metabolic Panel; Future  - TSH; Future    2. Migraine without aura and without status migrainosus, not intractable    3. Anxiety  - dexlansoprazole (DEXILANT) 60 mg capsule; Take 1 capsule (60 mg total) by mouth once daily.  Dispense: 90 capsule; Refill: 3    4. Gastroesophageal reflux disease without esophagitis    5. Other migraine without status migrainosus, not intractable  - EScitalopram oxalate (LEXAPRO) 10 MG tablet; Take 1 tablet (10 mg total) by mouth once daily.   Dispense: 90 tablet; Refill: 3    6. Class 1 obesity due to excess calories without serious comorbidity with body mass index (BMI) of 33.0 to 33.9 in adult    7. Menopausal disorder    8. Need for influenza vaccination  - Influenza - Quadrivalent *Preferred* (6 months+) (PF)    9. Screening for viral disease  - Hepatitis C Antibody; Future    -flu shot today, will get Shingrix at pharmacy  -labs ordered  -counseled on COVID booster  -Continue current medications and maintain follow up with specialists.    -Follow up in about 1 year (around 3/3/2023) for Annual Visit.       Rachel Wise MD  [facility]        Answers for HPI/ROS submitted by the patient on 3/2/2022  activity change: No  unexpected weight change: No  rhinorrhea: No  trouble swallowing: No  visual disturbance: No  chest tightness: No  polyuria: No  difficulty urinating: No  menstrual problem: No  joint swelling: No  arthralgias: No  confusion: No  dysphoric mood: No

## 2022-03-03 NOTE — ASSESSMENT & PLAN NOTE
Stable on imitrexor or fioricet PRN, still works when she gets one.  Gets bad migraine once per month.  Most headaches are more mild.

## 2022-03-03 NOTE — ASSESSMENT & PLAN NOTE
Stable on dexilant, works well for her.  No reflux/nausea/bloating.  Had EGD with Dr. Geronimo in past, had repair of hiatal hernia in past.  Trying to eat more healthy.

## 2022-03-04 ENCOUNTER — LAB VISIT (OUTPATIENT)
Dept: LAB | Facility: HOSPITAL | Age: 57
End: 2022-03-04
Attending: INTERNAL MEDICINE
Payer: COMMERCIAL

## 2022-03-04 DIAGNOSIS — Z11.59 SCREENING FOR VIRAL DISEASE: ICD-10-CM

## 2022-03-04 DIAGNOSIS — Z00.00 ANNUAL PHYSICAL EXAM: ICD-10-CM

## 2022-03-04 LAB
ALBUMIN SERPL BCP-MCNC: 4.1 G/DL (ref 3.5–5.2)
ALP SERPL-CCNC: 73 U/L (ref 55–135)
ALT SERPL W/O P-5'-P-CCNC: 7 U/L (ref 10–44)
ANION GAP SERPL CALC-SCNC: 9 MMOL/L (ref 8–16)
AST SERPL-CCNC: 13 U/L (ref 10–40)
BASOPHILS # BLD AUTO: 0.05 K/UL (ref 0–0.2)
BASOPHILS NFR BLD: 0.9 % (ref 0–1.9)
BILIRUB SERPL-MCNC: 0.2 MG/DL (ref 0.1–1)
BUN SERPL-MCNC: 12 MG/DL (ref 6–20)
CALCIUM SERPL-MCNC: 9.7 MG/DL (ref 8.7–10.5)
CHLORIDE SERPL-SCNC: 107 MMOL/L (ref 95–110)
CHOLEST SERPL-MCNC: 224 MG/DL (ref 120–199)
CHOLEST/HDLC SERPL: 3.3 {RATIO} (ref 2–5)
CO2 SERPL-SCNC: 25 MMOL/L (ref 23–29)
CREAT SERPL-MCNC: 0.8 MG/DL (ref 0.5–1.4)
DIFFERENTIAL METHOD: ABNORMAL
EOSINOPHIL # BLD AUTO: 0.2 K/UL (ref 0–0.5)
EOSINOPHIL NFR BLD: 2.8 % (ref 0–8)
ERYTHROCYTE [DISTWIDTH] IN BLOOD BY AUTOMATED COUNT: 14.5 % (ref 11.5–14.5)
EST. GFR  (AFRICAN AMERICAN): >60 ML/MIN/1.73 M^2
EST. GFR  (NON AFRICAN AMERICAN): >60 ML/MIN/1.73 M^2
GLUCOSE SERPL-MCNC: 88 MG/DL (ref 70–110)
HCT VFR BLD AUTO: 34.7 % (ref 37–48.5)
HDLC SERPL-MCNC: 68 MG/DL (ref 40–75)
HDLC SERPL: 30.4 % (ref 20–50)
HGB BLD-MCNC: 11.3 G/DL (ref 12–16)
IMM GRANULOCYTES # BLD AUTO: 0.01 K/UL (ref 0–0.04)
IMM GRANULOCYTES NFR BLD AUTO: 0.2 % (ref 0–0.5)
LDLC SERPL CALC-MCNC: 139 MG/DL (ref 63–159)
LYMPHOCYTES # BLD AUTO: 1.4 K/UL (ref 1–4.8)
LYMPHOCYTES NFR BLD: 26.5 % (ref 18–48)
MCH RBC QN AUTO: 29 PG (ref 27–31)
MCHC RBC AUTO-ENTMCNC: 32.6 G/DL (ref 32–36)
MCV RBC AUTO: 89 FL (ref 82–98)
MONOCYTES # BLD AUTO: 0.5 K/UL (ref 0.3–1)
MONOCYTES NFR BLD: 9.3 % (ref 4–15)
NEUTROPHILS # BLD AUTO: 3.3 K/UL (ref 1.8–7.7)
NEUTROPHILS NFR BLD: 60.3 % (ref 38–73)
NONHDLC SERPL-MCNC: 156 MG/DL
NRBC BLD-RTO: 0 /100 WBC
PLATELET # BLD AUTO: 308 K/UL (ref 150–450)
PMV BLD AUTO: 10.5 FL (ref 9.2–12.9)
POTASSIUM SERPL-SCNC: 4.5 MMOL/L (ref 3.5–5.1)
PROT SERPL-MCNC: 7.5 G/DL (ref 6–8.4)
RBC # BLD AUTO: 3.9 M/UL (ref 4–5.4)
SODIUM SERPL-SCNC: 141 MMOL/L (ref 136–145)
TRIGL SERPL-MCNC: 85 MG/DL (ref 30–150)
TSH SERPL DL<=0.005 MIU/L-ACNC: 1.9 UIU/ML (ref 0.4–4)
WBC # BLD AUTO: 5.4 K/UL (ref 3.9–12.7)

## 2022-03-04 PROCEDURE — 85025 COMPLETE CBC W/AUTO DIFF WBC: CPT | Performed by: INTERNAL MEDICINE

## 2022-03-04 PROCEDURE — 80053 COMPREHEN METABOLIC PANEL: CPT | Performed by: INTERNAL MEDICINE

## 2022-03-04 PROCEDURE — 86803 HEPATITIS C AB TEST: CPT | Performed by: INTERNAL MEDICINE

## 2022-03-04 PROCEDURE — 36415 COLL VENOUS BLD VENIPUNCTURE: CPT | Performed by: INTERNAL MEDICINE

## 2022-03-04 PROCEDURE — 84443 ASSAY THYROID STIM HORMONE: CPT | Performed by: INTERNAL MEDICINE

## 2022-03-04 PROCEDURE — 80061 LIPID PANEL: CPT | Performed by: INTERNAL MEDICINE

## 2022-03-09 LAB — HCV AB SERPL QL IA: NEGATIVE

## 2022-03-20 DIAGNOSIS — G43.809 OTHER MIGRAINE, NOT INTRACTABLE, WITHOUT STATUS MIGRAINOSUS: ICD-10-CM

## 2022-03-20 NOTE — TELEPHONE ENCOUNTER
No new care gaps identified.  Powered by [employer] by [employer]. Reference number: [id number].   3/20/2022 1:25:42 AM CDT

## 2022-03-21 RX ORDER — BUTALBITAL, ACETAMINOPHEN AND CAFFEINE 50; 325; 40 MG/1; MG/1; MG/1
TABLET ORAL
Qty: 30 TABLET | Refills: 1 | Status: SHIPPED | OUTPATIENT
Start: 2022-03-21 | End: 2022-03-31 | Stop reason: SDUPTHER

## 2022-03-31 DIAGNOSIS — G43.809 OTHER MIGRAINE, NOT INTRACTABLE, WITHOUT STATUS MIGRAINOSUS: ICD-10-CM

## 2022-03-31 RX ORDER — BUTALBITAL, ACETAMINOPHEN AND CAFFEINE 50; 325; 40 MG/1; MG/1; MG/1
1 TABLET ORAL EVERY 6 HOURS PRN
Qty: 30 TABLET | Refills: 1 | Status: SHIPPED | OUTPATIENT
Start: 2022-03-31 | End: 2022-04-09 | Stop reason: SDUPTHER

## 2022-03-31 NOTE — TELEPHONE ENCOUNTER
No new care gaps identified.  Powered by [employer]. Reference number: [id number].   3/31/2022 2:04:28 PM CDT

## 2022-04-09 DIAGNOSIS — G43.809 OTHER MIGRAINE, NOT INTRACTABLE, WITHOUT STATUS MIGRAINOSUS: ICD-10-CM

## 2022-04-10 DIAGNOSIS — G43.809 OTHER MIGRAINE, NOT INTRACTABLE, WITHOUT STATUS MIGRAINOSUS: ICD-10-CM

## 2022-04-10 RX ORDER — BUTALBITAL, ACETAMINOPHEN AND CAFFEINE 50; 325; 40 MG/1; MG/1; MG/1
TABLET ORAL
Qty: 30 TABLET | Refills: 1 | Status: SHIPPED | OUTPATIENT
Start: 2022-04-10 | End: 2022-04-25 | Stop reason: SDUPTHER

## 2022-04-10 RX ORDER — BUTALBITAL, ACETAMINOPHEN AND CAFFEINE 50; 325; 40 MG/1; MG/1; MG/1
1 TABLET ORAL EVERY 6 HOURS PRN
Qty: 30 TABLET | Refills: 1 | Status: SHIPPED | OUTPATIENT
Start: 2022-04-10 | End: 2022-04-18 | Stop reason: SDUPTHER

## 2022-04-10 NOTE — TELEPHONE ENCOUNTER
No new care gaps identified.  Powered by [employer] by [employer]. Reference number: [id number].   4/09/2022 8:06:36 PM CDT

## 2022-04-10 NOTE — TELEPHONE ENCOUNTER
No new care gaps identified.  Powered by [employer] by [employer]. Reference number: [id number].   4/10/2022 11:12:09 AM CDT

## 2022-04-18 DIAGNOSIS — G43.809 OTHER MIGRAINE, NOT INTRACTABLE, WITHOUT STATUS MIGRAINOSUS: ICD-10-CM

## 2022-04-18 RX ORDER — BUTALBITAL, ACETAMINOPHEN AND CAFFEINE 50; 325; 40 MG/1; MG/1; MG/1
1 TABLET ORAL EVERY 6 HOURS PRN
Qty: 30 TABLET | Refills: 1 | Status: SHIPPED | OUTPATIENT
Start: 2022-04-18 | End: 2022-07-11 | Stop reason: SDUPTHER

## 2022-04-18 NOTE — TELEPHONE ENCOUNTER
No new care gaps identified.  Powered by [employer]. Reference number: [id number].   4/18/2022 11:28:15 AM CDT

## 2022-04-25 DIAGNOSIS — G43.809 OTHER MIGRAINE, NOT INTRACTABLE, WITHOUT STATUS MIGRAINOSUS: ICD-10-CM

## 2022-04-25 RX ORDER — BUTALBITAL, ACETAMINOPHEN AND CAFFEINE 50; 325; 40 MG/1; MG/1; MG/1
1 TABLET ORAL EVERY 6 HOURS PRN
Qty: 30 TABLET | Refills: 1 | Status: SHIPPED | OUTPATIENT
Start: 2022-04-25 | End: 2022-07-11 | Stop reason: SDUPTHER

## 2022-04-25 RX ORDER — BUTALBITAL, ACETAMINOPHEN AND CAFFEINE 50; 325; 40 MG/1; MG/1; MG/1
1 TABLET ORAL EVERY 6 HOURS PRN
Qty: 30 TABLET | Refills: 1 | OUTPATIENT
Start: 2022-04-25

## 2022-04-25 NOTE — TELEPHONE ENCOUNTER
No new care gaps identified.  Powered by [employer] by [employer]. Reference number: [id number].   4/25/2022 9:10:54 AM CDT

## 2022-05-04 RX ORDER — BUTALBITAL, ACETAMINOPHEN AND CAFFEINE 50; 325; 40 MG/1; MG/1; MG/1
TABLET ORAL
Qty: 30 TABLET | Refills: 5 | Status: SHIPPED | OUTPATIENT
Start: 2022-05-04 | End: 2022-06-07 | Stop reason: SDUPTHER

## 2022-05-04 NOTE — TELEPHONE ENCOUNTER
No new care gaps identified.  [facility] Embedded Care Gaps. Reference number: [id number]. 5/04/2022   7:46:55 AM CDT

## 2022-06-07 RX ORDER — BUTALBITAL, ACETAMINOPHEN AND CAFFEINE 50; 325; 40 MG/1; MG/1; MG/1
1 TABLET ORAL EVERY 6 HOURS PRN
Qty: 30 TABLET | Refills: 5 | Status: SHIPPED | OUTPATIENT
Start: 2022-06-07 | End: 2022-07-10 | Stop reason: SDUPTHER

## 2022-06-07 NOTE — TELEPHONE ENCOUNTER
No new care gaps identified.  [facility] Embedded Care Gaps. Reference number: [id number]. 6/07/2022   1:29:55 PM CDT

## 2022-07-11 RX ORDER — BUTALBITAL, ACETAMINOPHEN AND CAFFEINE 50; 325; 40 MG/1; MG/1; MG/1
1 TABLET ORAL EVERY 6 HOURS PRN
Qty: 30 TABLET | Refills: 1 | Status: CANCELLED | OUTPATIENT
Start: 2022-07-11

## 2022-07-11 RX ORDER — BUTALBITAL, ACETAMINOPHEN AND CAFFEINE 50; 325; 40 MG/1; MG/1; MG/1
1 TABLET ORAL EVERY 6 HOURS PRN
Qty: 30 TABLET | Refills: 1 | OUTPATIENT
Start: 2022-07-11

## 2022-07-12 RX ORDER — BUTALBITAL, ACETAMINOPHEN AND CAFFEINE 50; 325; 40 MG/1; MG/1; MG/1
1 TABLET ORAL EVERY 6 HOURS PRN
Qty: 30 TABLET | Refills: 1 | OUTPATIENT
Start: 2022-07-12

## 2022-08-01 RX ORDER — BUTALBITAL, ACETAMINOPHEN AND CAFFEINE 50; 325; 40 MG/1; MG/1; MG/1
1 TABLET ORAL EVERY 6 HOURS PRN
Qty: 30 TABLET | Refills: 0 | Status: SHIPPED | OUTPATIENT
Start: 2022-08-01 | End: 2022-08-11 | Stop reason: SDUPTHER

## 2022-08-01 NOTE — TELEPHONE ENCOUNTER
No new care gaps identified.  [facility] Embedded Care Gaps. Reference number: [id number]. 8/01/2022   9:59:58 AM MARYT

## 2023-01-02 NOTE — TELEPHONE ENCOUNTER
No new care gaps identified.  [facility] Embedded Care Gaps. Reference number: [id number]. 1/02/2023   4:39:37 PM CST

## 2023-01-03 RX ORDER — BUTALBITAL, ACETAMINOPHEN AND CAFFEINE 50; 325; 40 MG/1; MG/1; MG/1
1 TABLET ORAL EVERY 6 HOURS PRN
Qty: 30 TABLET | Refills: 5 | Status: SHIPPED | OUTPATIENT
Start: 2023-01-03 | End: 2023-02-06 | Stop reason: SDUPTHER

## 2023-04-13 ENCOUNTER — OFFICE VISIT (OUTPATIENT)
Dept: PRIMARY CARE CLINIC | Facility: CLINIC | Age: 58
End: 2023-04-13
Payer: COMMERCIAL

## 2023-04-13 VITALS
SYSTOLIC BLOOD PRESSURE: 138 MMHG | BODY MASS INDEX: 31.6 KG/M2 | HEART RATE: 63 BPM | HEIGHT: 62 IN | DIASTOLIC BLOOD PRESSURE: 86 MMHG | WEIGHT: 171.75 LBS | OXYGEN SATURATION: 100 %

## 2023-04-13 DIAGNOSIS — E66.09 CLASS 1 OBESITY DUE TO EXCESS CALORIES WITHOUT SERIOUS COMORBIDITY WITH BODY MASS INDEX (BMI) OF 31.0 TO 31.9 IN ADULT: ICD-10-CM

## 2023-04-13 DIAGNOSIS — F41.9 ANXIETY: ICD-10-CM

## 2023-04-13 DIAGNOSIS — K21.9 GASTROESOPHAGEAL REFLUX DISEASE WITHOUT ESOPHAGITIS: ICD-10-CM

## 2023-04-13 DIAGNOSIS — N95.9 MENOPAUSAL DISORDER: ICD-10-CM

## 2023-04-13 DIAGNOSIS — G43.009 MIGRAINE WITHOUT AURA AND WITHOUT STATUS MIGRAINOSUS, NOT INTRACTABLE: ICD-10-CM

## 2023-04-13 DIAGNOSIS — Z00.00 ANNUAL PHYSICAL EXAM: Primary | ICD-10-CM

## 2023-04-13 PROCEDURE — 3079F DIAST BP 80-89 MM HG: CPT | Mod: CPTII,S$GLB,, | Performed by: INTERNAL MEDICINE

## 2023-04-13 PROCEDURE — 3008F PR BODY MASS INDEX (BMI) DOCUMENTED: ICD-10-PCS | Mod: CPTII,S$GLB,, | Performed by: INTERNAL MEDICINE

## 2023-04-13 PROCEDURE — 99999 PR PBB SHADOW E&M-EST. PATIENT-LVL III: ICD-10-PCS | Mod: PBBFAC,,, | Performed by: INTERNAL MEDICINE

## 2023-04-13 PROCEDURE — 99396 PR PREVENTIVE VISIT,EST,40-64: ICD-10-PCS | Mod: S$GLB,,, | Performed by: INTERNAL MEDICINE

## 2023-04-13 PROCEDURE — 99396 PREV VISIT EST AGE 40-64: CPT | Mod: S$GLB,,, | Performed by: INTERNAL MEDICINE

## 2023-04-13 PROCEDURE — 99999 PR PBB SHADOW E&M-EST. PATIENT-LVL III: CPT | Mod: PBBFAC,,, | Performed by: INTERNAL MEDICINE

## 2023-04-13 PROCEDURE — 1159F MED LIST DOCD IN RCRD: CPT | Mod: CPTII,S$GLB,, | Performed by: INTERNAL MEDICINE

## 2023-04-13 PROCEDURE — 3075F SYST BP GE 130 - 139MM HG: CPT | Mod: CPTII,S$GLB,, | Performed by: INTERNAL MEDICINE

## 2023-04-13 PROCEDURE — 3079F PR MOST RECENT DIASTOLIC BLOOD PRESSURE 80-89 MM HG: ICD-10-PCS | Mod: CPTII,S$GLB,, | Performed by: INTERNAL MEDICINE

## 2023-04-13 PROCEDURE — 3075F PR MOST RECENT SYSTOLIC BLOOD PRESS GE 130-139MM HG: ICD-10-PCS | Mod: CPTII,S$GLB,, | Performed by: INTERNAL MEDICINE

## 2023-04-13 PROCEDURE — 1159F PR MEDICATION LIST DOCUMENTED IN MEDICAL RECORD: ICD-10-PCS | Mod: CPTII,S$GLB,, | Performed by: INTERNAL MEDICINE

## 2023-04-13 PROCEDURE — 3008F BODY MASS INDEX DOCD: CPT | Mod: CPTII,S$GLB,, | Performed by: INTERNAL MEDICINE

## 2023-04-13 RX ORDER — ESCITALOPRAM OXALATE 20 MG/1
20 TABLET ORAL DAILY
Qty: 90 TABLET | Refills: 3 | Status: SHIPPED | OUTPATIENT
Start: 2023-04-13 | End: 2024-01-10 | Stop reason: SDUPTHER

## 2023-04-13 NOTE — ASSESSMENT & PLAN NOTE
Stable on lexapro 10 mg daily, no SI/HI/panic attacks.  Sleeping really well.  Lots of stress related to MIL's illness and disagreements with NIKI.  Staying in  in [place] with daughter part of the week.

## 2023-04-13 NOTE — ASSESSMENT & PLAN NOTE
Stable on protonix, works well for her.  No reflux/nausea/bloating.  Had EGD in past, had repair of hiatal hernia in past.  Now sees Beverly. Trying to eat more healthy.

## 2023-04-13 NOTE — PROGRESS NOTES
[facility] Note    Chief Complaint      Chief Complaint   Patient presents with    Annual Exam     History of Present Illness      Judy Foley is a 57 y.o. female who presents today for annual preventative visit.  Patient comes to appointment alone.  GI: Beverly, GYN: Mary    Problem List Items Addressed This Visit       Gastroesophageal reflux disease without esophagitis    Current Assessment & Plan     Stable on protonix, works well for her.  No reflux/nausea/bloating.  Had EGD in past, had repair of hiatal hernia in past.  Now sees Beverly. Trying to eat more healthy.           Migraine without status migrainosus, not intractable    Current Assessment & Plan     Stable on imitrexor or fioricet PRN, still works when she gets one.  Gets 2-3 mild HA's per week, can usually stop it by taking med earlier.  Had a severe migraine at [facility], will get that once per week.             Menopausal disorder    Current Assessment & Plan     Stable on topical estradiol, works well.  Sees GYN.           Anxiety    Current Assessment & Plan     Stable on lexapro 10 mg daily, no SI/HI/panic attacks.  Sleeping really well.  Lots of stress related to MIL's illness and disagreements with NIKI.  Staying in  in [place] with daughter part of the week.           Relevant Medications    EScitalopram oxalate (LEXAPRO) 20 MG tablet    Class 1 obesity due to excess calories without serious comorbidity with body mass index (BMI) of 31.0 to 31.9 in adult    Current Assessment & Plan     Has lost 11 pounds since last year.            Other Visit Diagnoses       Annual physical exam    -  Primary    Relevant Orders    CBC Auto Differential    Lipid Panel    Comprehensive Metabolic Panel    Hemoglobin A1C            Health Maintenance   Topic Date Due    Mammogram  11/22/2022    Lipid Panel  03/04/2027    TETANUS VACCINE  12/19/2028    Hepatitis C Screening  Completed       Past Medical History:   Diagnosis Date    GERD  (gastroesophageal reflux disease)        Past Surgical History:   Procedure Laterality Date    BUNIONECTOMY      CHOLECYSTECTOMY      colonoscopy  12/18/2017    Normal    EXCISION OF GANGLION CYST OF HAND Left 09/22/2020    Procedure: EXCISION, GANGLION CYST, HAND;  Surgeon: Ulysses Turner Jr., MD;  Location: [facility] OR;  Service: Orthopedics;  Laterality: Left;    HERNIA REPAIR      HYSTERECTOMY      ROTATOR CUFF REPAIR Left     TRIGGER FINGER RELEASE Left 09/22/2020    Procedure: RELEASE, TRIGGER FINGER;  Surgeon: Ulysses Turner Jr., MD;  Location: [facility] OR;  Service: Orthopedics;  Laterality: Left;       family history includes Arthritis in her mother; Cancer in her mother; Heart disease in her daughter, daughter, and father; Mental retardation in her daughter; Pulmonary fibrosis in her mother.    Social History     Tobacco Use    Smoking status: Never    Smokeless tobacco: Never   Substance Use Topics    Alcohol use: No    Drug use: Never       Review of Systems   Constitutional:  Negative for chills and fever.   Respiratory:  Negative for cough and shortness of breath.    Cardiovascular:  Negative for chest pain and palpitations.   Gastrointestinal:  Negative for constipation, diarrhea, nausea and vomiting.   Genitourinary:  Negative for dysuria and hematuria.   Musculoskeletal:  Negative for falls.   Neurological:  Negative for headaches.      Outpatient Encounter Medications as of 4/13/2023   Medication Sig Dispense Refill    butalbital-acetaminophen-caffeine -40 mg (FIORICET, ESGIC) -40 mg per tablet Take 1 tablet by mouth every 6 (six) hours as needed for Headaches. 30 tablet 5    diclofenac sodium (VOLTAREN) 1 % Gel Apply 2 g topically 4 (four) times daily. 1 Tube 2    estradiol 1.25 gram/actuation topical gel Place 1.25 g onto the skin once daily.      multivitamin capsule Take 1 capsule by mouth once daily.      sumatriptan (IMITREX) 25 MG Tab Take 1 tablet (25 mg total) by mouth every 2  "(two) hours as needed. 9 tablet 4    [DISCONTINUED] EScitalopram oxalate (LEXAPRO) 10 MG tablet TAKE 1 TABLET BY MOUTH ONCE DAILY 90 tablet 3    EScitalopram oxalate (LEXAPRO) 20 MG tablet Take 1 tablet (20 mg total) by mouth once daily. 90 tablet 3    [DISCONTINUED] butalbital-acetaminophen-caffeine -40 mg (FIORICET, ESGIC) -40 mg per tablet Take 1 tablet by mouth every 6 (six) hours as needed for Headaches. 30 tablet 5    [DISCONTINUED] dexlansoprazole (DEXILANT) 60 mg capsule Take 1 capsule (60 mg total) by mouth once daily. (Patient not taking: Reported on 4/13/2023) 90 capsule 3     No facility-administered encounter medications on file as of 4/13/2023.        Review of patient's allergies indicates:  No Known Allergies    Physical Exam      Vital Signs  Pulse: 63  SpO2: 100 %  BP: 138/86  Pain Score:   2  Pain Loc: Head  Height and Weight  Height: 5' 2" (157.5 cm)  Weight: 77.9 kg (171 lb 11.8 oz)  BSA (Calculated - sq m): 1.85 sq meters  BMI (Calculated): 31.4  Weight in (lb) to have BMI = 25: 136.4]    Physical Exam  Constitutional:       Appearance: She is well-developed.   HENT:      Head: Normocephalic and atraumatic.   Cardiovascular:      Rate and Rhythm: Normal rate and regular rhythm.      Heart sounds: Normal heart sounds. No murmur heard.  Pulmonary:      Effort: Pulmonary effort is normal. No respiratory distress.      Breath sounds: Normal breath sounds.   Abdominal:      General: There is no distension.      Palpations: Abdomen is soft.      Tenderness: There is no abdominal tenderness. There is no guarding.   Skin:     General: Skin is warm and dry.   Neurological:      Mental Status: She is alert. Mental status is at baseline.   Psychiatric:         Behavior: Behavior normal.        Laboratory:  CBC:  No results for input(s): WBC, RBC, HGB, HCT, PLT, MCV, MCH, MCHC in the last 2160 hours.  CMP:  No results for input(s): GLU, CALCIUM, ALBUMIN, PROT, NA, K, CO2, CL, BUN, ALKPHOS, ALT, " AST, BILITOT in the last 2160 hours.    Invalid input(s): CREATININ  URINALYSIS:  No results for input(s): COLORU, CLARITYU, SPECGRAV, PHUR, PROTEINUA, GLUCOSEU, BILIRUBINCON, BLOODU, WBCU, RBCU, BACTERIA, MUCUS, NITRITE, LEUKOCYTESUR, UROBILINOGEN, HYALINECASTS in the last 2160 hours.   LIPIDS:  No results for input(s): TSH, HDL, CHOL, TRIG, LDLCALC, CHOLHDL, NONHDLCHOL, TOTALCHOLEST in the last 2160 hours.  TSH:  No results for input(s): TSH in the last 2160 hours.  A1C:  No results for input(s): HGBA1C in the last 2160 hours.    Radiology:  No results found in the last 30 days.     Assessment/Plan     Judy Foley is a 57 y.o.female with:    1. Annual physical exam  - CBC Auto Differential; Future  - Lipid Panel; Future  - Comprehensive Metabolic Panel; Future  - Hemoglobin A1C; Future    2. Gastroesophageal reflux disease without esophagitis    3. Anxiety  - EScitalopram oxalate (LEXAPRO) 20 MG tablet; Take 1 tablet (20 mg total) by mouth once daily.  Dispense: 90 tablet; Refill: 3    4. Migraine without aura and without status migrainosus, not intractable    5. Menopausal disorder    6. Class 1 obesity due to excess calories without serious comorbidity with body mass index (BMI) of 31.0 to 31.9 in adult    -deeclines COVID booster   -labs ordered  -increase lexapro to 20 mg   -counseled on COVID booster  -Continue current medications and maintain follow up with specialists.    -Follow up in about 1 year (around 4/13/2024) for Annual Visit.       Rachel Wise MD  [facility]

## 2023-04-13 NOTE — ASSESSMENT & PLAN NOTE
Stable on imitrexor or fioricet PRN, still works when she gets one.  Gets 2-3 mild HA's per week, can usually stop it by taking med earlier.  Had a severe migraine at [facility], will get that once per week.

## 2023-04-14 ENCOUNTER — LAB VISIT (OUTPATIENT)
Dept: LAB | Facility: HOSPITAL | Age: 58
End: 2023-04-14
Attending: INTERNAL MEDICINE
Payer: COMMERCIAL

## 2023-04-14 DIAGNOSIS — Z00.00 ANNUAL PHYSICAL EXAM: ICD-10-CM

## 2023-04-14 LAB
ALBUMIN SERPL BCP-MCNC: 4.1 G/DL (ref 3.5–5.2)
ALP SERPL-CCNC: 72 U/L (ref 55–135)
ALT SERPL W/O P-5'-P-CCNC: 5 U/L (ref 10–44)
ANION GAP SERPL CALC-SCNC: 11 MMOL/L (ref 8–16)
AST SERPL-CCNC: 12 U/L (ref 10–40)
BASOPHILS # BLD AUTO: 0.05 K/UL (ref 0–0.2)
BASOPHILS NFR BLD: 1.1 % (ref 0–1.9)
BILIRUB SERPL-MCNC: 0.4 MG/DL (ref 0.1–1)
BUN SERPL-MCNC: 12 MG/DL (ref 6–20)
CALCIUM SERPL-MCNC: 9.5 MG/DL (ref 8.7–10.5)
CHLORIDE SERPL-SCNC: 108 MMOL/L (ref 95–110)
CHOLEST SERPL-MCNC: 218 MG/DL (ref 120–199)
CHOLEST/HDLC SERPL: 2.9 {RATIO} (ref 2–5)
CO2 SERPL-SCNC: 23 MMOL/L (ref 23–29)
CREAT SERPL-MCNC: 0.9 MG/DL (ref 0.5–1.4)
DIFFERENTIAL METHOD: ABNORMAL
EOSINOPHIL # BLD AUTO: 0.1 K/UL (ref 0–0.5)
EOSINOPHIL NFR BLD: 3 % (ref 0–8)
ERYTHROCYTE [DISTWIDTH] IN BLOOD BY AUTOMATED COUNT: 13.4 % (ref 11.5–14.5)
EST. GFR  (NO RACE VARIABLE): >60 ML/MIN/1.73 M^2
ESTIMATED AVG GLUCOSE: 100 MG/DL (ref 68–131)
GLUCOSE SERPL-MCNC: 88 MG/DL (ref 70–110)
HBA1C MFR BLD: 5.1 % (ref 4–5.6)
HCT VFR BLD AUTO: 36.6 % (ref 37–48.5)
HDLC SERPL-MCNC: 74 MG/DL (ref 40–75)
HDLC SERPL: 33.9 % (ref 20–50)
HGB BLD-MCNC: 11.6 G/DL (ref 12–16)
IMM GRANULOCYTES # BLD AUTO: 0.01 K/UL (ref 0–0.04)
IMM GRANULOCYTES NFR BLD AUTO: 0.2 % (ref 0–0.5)
LDLC SERPL CALC-MCNC: 128.8 MG/DL (ref 63–159)
LYMPHOCYTES # BLD AUTO: 1.7 K/UL (ref 1–4.8)
LYMPHOCYTES NFR BLD: 35.9 % (ref 18–48)
MCH RBC QN AUTO: 28.8 PG (ref 27–31)
MCHC RBC AUTO-ENTMCNC: 31.7 G/DL (ref 32–36)
MCV RBC AUTO: 91 FL (ref 82–98)
MONOCYTES # BLD AUTO: 0.5 K/UL (ref 0.3–1)
MONOCYTES NFR BLD: 10.6 % (ref 4–15)
NEUTROPHILS # BLD AUTO: 2.3 K/UL (ref 1.8–7.7)
NEUTROPHILS NFR BLD: 49.2 % (ref 38–73)
NONHDLC SERPL-MCNC: 144 MG/DL
NRBC BLD-RTO: 0 /100 WBC
PLATELET # BLD AUTO: 312 K/UL (ref 150–450)
PMV BLD AUTO: 10 FL (ref 9.2–12.9)
POTASSIUM SERPL-SCNC: 4.5 MMOL/L (ref 3.5–5.1)
PROT SERPL-MCNC: 7.3 G/DL (ref 6–8.4)
RBC # BLD AUTO: 4.03 M/UL (ref 4–5.4)
SODIUM SERPL-SCNC: 142 MMOL/L (ref 136–145)
TRIGL SERPL-MCNC: 76 MG/DL (ref 30–150)
WBC # BLD AUTO: 4.71 K/UL (ref 3.9–12.7)

## 2023-04-14 PROCEDURE — 36415 COLL VENOUS BLD VENIPUNCTURE: CPT | Performed by: INTERNAL MEDICINE

## 2023-04-14 PROCEDURE — 80061 LIPID PANEL: CPT | Performed by: INTERNAL MEDICINE

## 2023-04-14 PROCEDURE — 80053 COMPREHEN METABOLIC PANEL: CPT | Performed by: INTERNAL MEDICINE

## 2023-04-14 PROCEDURE — 85025 COMPLETE CBC W/AUTO DIFF WBC: CPT | Performed by: INTERNAL MEDICINE

## 2023-04-14 PROCEDURE — 83036 HEMOGLOBIN GLYCOSYLATED A1C: CPT | Performed by: INTERNAL MEDICINE

## 2023-05-07 NOTE — TELEPHONE ENCOUNTER
No care due was identified.  [facility] Embedded Care Due Messages. Reference number: [id number].   5/06/2023 10:02:58 PM CDT

## 2023-05-08 RX ORDER — BUTALBITAL, ACETAMINOPHEN AND CAFFEINE 50; 325; 40 MG/1; MG/1; MG/1
1 TABLET ORAL EVERY 6 HOURS PRN
Qty: 30 TABLET | Refills: 5 | Status: SHIPPED | OUTPATIENT
Start: 2023-05-08 | End: 2023-06-03 | Stop reason: SDUPTHER

## 2023-06-03 NOTE — TELEPHONE ENCOUNTER
No care due was identified.  [facility] Embedded Care Due Messages. Reference number: [id number].   6/03/2023 12:57:37 PM CDT

## 2023-06-05 ENCOUNTER — PATIENT OUTREACH (OUTPATIENT)
Dept: ADMINISTRATIVE | Facility: HOSPITAL | Age: 58
End: 2023-06-05
Payer: COMMERCIAL

## 2023-06-05 RX ORDER — BUTALBITAL, ACETAMINOPHEN AND CAFFEINE 50; 325; 40 MG/1; MG/1; MG/1
1 TABLET ORAL EVERY 6 HOURS PRN
Qty: 30 TABLET | Refills: 5 | Status: SHIPPED | OUTPATIENT
Start: 2023-06-05 | End: 2023-07-02 | Stop reason: SDUPTHER

## 2023-06-05 NOTE — LETTER
AUTHORIZATION FOR RELEASE OF   CONFIDENTIAL INFORMATION    Dear Dr. Hernandez,    We are seeing Judy Foley, date of birth 1965, in the clinic at [facility]. Rachel Wise MD is the patient's PCP. Judy Foley has an outstanding lab/procedure at the time we reviewed her chart. In order to help keep her health information updated, she has authorized us to request the following medical record(s):        (XX)  MAMMOGRAM                                      (  )  COLONOSCOPY      (  )  PAP SMEAR                                          (  )  OUTSIDE LAB RESULTS     (  )  DEXA SCAN                                          (  )  EYE EXAM            (  )  FOOT EXAM                                          (  )  ENTIRE RECORD     (  )  OUTSIDE IMMUNIZATIONS                 (  )  _______________         Please fax records to Rachel Wise MD, [phone].     If you have any questions, please contact KAREY Brown at [phone].           Patient Name: Judy Foley  : 1965  Patient Phone #: [phone]

## 2023-06-05 NOTE — PROGRESS NOTES
Health Maintenance Due   Topic Date Due    COVID-19 Vaccine (3 - Moderna series) 05/04/2021    Mammogram  11/22/2022     Chart review done. HM updated. Immunizations reviewed & updated. Care Everywhere updated.  OI to Dr. Citlaly Hernandez - [facility] - [phone] for Mammogram.

## 2023-07-02 NOTE — TELEPHONE ENCOUNTER
No care due was identified.  [facility] Embedded Care Due Messages. Reference number: [id number].   7/02/2023 2:10:35 PM CDT

## 2023-07-03 RX ORDER — BUTALBITAL, ACETAMINOPHEN AND CAFFEINE 50; 325; 40 MG/1; MG/1; MG/1
1 TABLET ORAL EVERY 6 HOURS PRN
Qty: 30 TABLET | Refills: 5 | Status: SHIPPED | OUTPATIENT
Start: 2023-07-03 | End: 2023-07-29 | Stop reason: SDUPTHER

## 2023-07-03 RX ORDER — SUMATRIPTAN SUCCINATE 25 MG/1
25 TABLET ORAL
Qty: 9 TABLET | Refills: 4 | Status: SHIPPED | OUTPATIENT
Start: 2023-07-03 | End: 2024-03-22 | Stop reason: SDUPTHER

## 2023-07-29 NOTE — TELEPHONE ENCOUNTER
No care due was identified.  [facility] Embedded Care Due Messages. Reference number: [id number].   7/29/2023 12:04:03 PM CDT

## 2023-07-31 RX ORDER — BUTALBITAL, ACETAMINOPHEN AND CAFFEINE 50; 325; 40 MG/1; MG/1; MG/1
1 TABLET ORAL EVERY 6 HOURS PRN
Qty: 30 TABLET | Refills: 5 | Status: SHIPPED | OUTPATIENT
Start: 2023-07-31 | End: 2023-08-28 | Stop reason: SDUPTHER

## 2023-08-28 NOTE — TELEPHONE ENCOUNTER
No care due was identified.  [facility] Embedded Care Due Messages. Reference number: [id number].   8/28/2023 2:43:39 PM CDT

## 2023-08-29 RX ORDER — BUTALBITAL, ACETAMINOPHEN AND CAFFEINE 50; 325; 40 MG/1; MG/1; MG/1
1 TABLET ORAL EVERY 6 HOURS PRN
Qty: 30 TABLET | Refills: 5 | Status: SHIPPED | OUTPATIENT
Start: 2023-08-29 | End: 2023-09-28 | Stop reason: SDUPTHER

## 2023-09-29 ENCOUNTER — TELEPHONE (OUTPATIENT)
Dept: PRIMARY CARE CLINIC | Facility: CLINIC | Age: 58
End: 2023-09-29
Payer: COMMERCIAL

## 2023-09-29 RX ORDER — BUTALBITAL, ACETAMINOPHEN AND CAFFEINE 50; 325; 40 MG/1; MG/1; MG/1
1 TABLET ORAL EVERY 6 HOURS PRN
Qty: 30 TABLET | Refills: 5 | Status: SHIPPED | OUTPATIENT
Start: 2023-09-29 | End: 2023-11-10

## 2023-09-29 NOTE — TELEPHONE ENCOUNTER
No care due was identified.  [facility] Embedded Care Due Messages. Reference number: [id number].   9/28/2023 10:11:14 PM CDT

## 2023-09-29 NOTE — TELEPHONE ENCOUNTER
----- Message from Pamela Villafana sent at 9/29/2023  9:43 AM CDT -----  Pt Requesting Call Back    Who called: Chip  Who called for pt:  Best call back #: [phone]  Add notes: says she would like to speak to the doctor regarding pt's medication that cause pt to have rebound headaches

## 2023-09-29 NOTE — TELEPHONE ENCOUNTER
Asking if you are aware that pt is getting fioricet every 7 days. Said she's been getting it every 7 days since before May, asking if you want to add anything to her med regimen, states she knows she is on sumatriptan but she cant take that daily?

## 2023-09-29 NOTE — TELEPHONE ENCOUNTER
Call patient, may need virtual visit to discuss preventative medication, can't take fioricet every day.    Ok to overbook into 7:15 spot

## 2023-10-02 ENCOUNTER — PATIENT MESSAGE (OUTPATIENT)
Dept: PRIMARY CARE CLINIC | Facility: CLINIC | Age: 58
End: 2023-10-02

## 2023-10-02 ENCOUNTER — OFFICE VISIT (OUTPATIENT)
Dept: PRIMARY CARE CLINIC | Facility: CLINIC | Age: 58
End: 2023-10-02
Payer: COMMERCIAL

## 2023-10-02 DIAGNOSIS — G43.009 MIGRAINE WITHOUT AURA AND WITHOUT STATUS MIGRAINOSUS, NOT INTRACTABLE: Primary | ICD-10-CM

## 2023-10-02 PROCEDURE — 99214 PR OFFICE/OUTPT VISIT, EST, LEVL IV, 30-39 MIN: ICD-10-PCS | Mod: 95,,, | Performed by: INTERNAL MEDICINE

## 2023-10-02 PROCEDURE — 99214 OFFICE O/P EST MOD 30 MIN: CPT | Mod: 95,,, | Performed by: INTERNAL MEDICINE

## 2023-10-02 RX ORDER — UBROGEPANT 100 MG/1
100 TABLET ORAL
Qty: 9 TABLET | Refills: 1 | Status: SHIPPED | OUTPATIENT
Start: 2023-10-02 | End: 2023-10-03

## 2023-10-02 RX ORDER — RIMEGEPANT SULFATE 75 MG/75MG
75 TABLET, ORALLY DISINTEGRATING ORAL ONCE AS NEEDED
Qty: 9 TABLET | Refills: 1 | Status: SHIPPED | OUTPATIENT
Start: 2023-10-02 | End: 2023-10-03 | Stop reason: SDUPTHER

## 2023-10-02 NOTE — ASSESSMENT & PLAN NOTE
Wakes up with sinus HA and pressure in AM. Does not have any nausea/vomiting.  If she doesn't take something right away, will progress to a full blown migraine. Has to take both Imitrex and Fioricet 3-4 times per month. Has been taking Allegra,     Previously getting 2-3 mild HA's per week.

## 2023-10-02 NOTE — PROGRESS NOTES
The patient location is: home  The chief complaint leading to consultation is: HA    Visit type: audiovisual    Face to Face time with patient: 10 minutes  10 minutes of total time spent on the encounter, which includes face to face time and non-face to face time preparing to see the patient (eg, review of tests), Obtaining and/or reviewing separately obtained history, Documenting clinical information in the electronic or other health record, Independently interpreting results (not separately reported) and communicating results to the patient/family/caregiver, or Care coordination (not separately reported).         Each patient to whom he or she provides medical services by telemedicine is:  (1) informed of the relationship between the physician and patient and the respective role of any other health care provider with respect to management of the patient; and (2) notified that he or she may decline to receive medical services by telemedicine and may withdraw from such care at any time.    [facility] Note    Chief Complaint      Chief Complaint   Patient presents with    Headache    Sinus Problem     History of Present Illness      Judy Foley is a 57 y.o. female who presents today for annual preventative visit.  Patient comes to appointment alone.  GI: Beverly, GYN: Mary    Problem List Items Addressed This Visit       Migraine without status migrainosus, not intractable - Primary    Current Assessment & Plan     Wakes up with sinus HA and pressure in AM. Does not have any nausea/vomiting.  If she doesn't take something right away, will progress to a full blown migraine. Has to take both Imitrex and Fioricet 3-4 times per month. Has been taking Allegra,     Previously getting 2-3 mild HA's per week.         Relevant Medications    ubrogepant (UBRELVY) 100 mg tablet       Health Maintenance   Topic Date Due    Mammogram  11/22/2022    Colorectal Cancer Screening  12/18/2027    Lipid Panel  04/14/2028     TETANUS VACCINE  12/19/2028    Hepatitis C Screening  Completed    Shingles Vaccine  Completed       Past Medical History:   Diagnosis Date    GERD (gastroesophageal reflux disease)        Past Surgical History:   Procedure Laterality Date    BUNIONECTOMY      CHOLECYSTECTOMY      colonoscopy  12/18/2017    Normal    EXCISION OF GANGLION CYST OF HAND Left 09/22/2020    Procedure: EXCISION, GANGLION CYST, HAND;  Surgeon: Ulysses Turnre Jr., MD;  Location: [facility] OR;  Service: Orthopedics;  Laterality: Left;    HERNIA REPAIR      HYSTERECTOMY      ROTATOR CUFF REPAIR Left     TRIGGER FINGER RELEASE Left 09/22/2020    Procedure: RELEASE, TRIGGER FINGER;  Surgeon: Ulysses Turner Jr., MD;  Location: [facility] OR;  Service: Orthopedics;  Laterality: Left;       family history includes Arthritis in her mother; Cancer in her mother; Heart disease in her daughter, daughter, and father; Mental retardation in her daughter; Pulmonary fibrosis in her mother.    Social History     Tobacco Use    Smoking status: Never    Smokeless tobacco: Never   Substance Use Topics    Alcohol use: No    Drug use: Never       Review of Systems   Constitutional:  Negative for chills and fever.   HENT:  Negative for hearing loss.    Eyes:  Negative for discharge.   Respiratory:  Negative for cough, shortness of breath and wheezing.    Cardiovascular:  Negative for chest pain and palpitations.   Gastrointestinal:  Negative for blood in stool, constipation, diarrhea, nausea and vomiting.   Genitourinary:  Negative for dysuria and hematuria.   Musculoskeletal:  Negative for falls and neck pain.   Neurological:  Negative for weakness and headaches.   Endo/Heme/Allergies:  Negative for polydipsia.        Outpatient Encounter Medications as of 10/2/2023   Medication Sig Dispense Refill    butalbital-acetaminophen-caffeine -40 mg (FIORICET, ESGIC) -40 mg per tablet Take 1 tablet by mouth every 6 (six) hours as needed for Headaches. 30 tablet  "5    diclofenac sodium (VOLTAREN) 1 % Gel Apply 2 g topically 4 (four) times daily. 1 Tube 2    EScitalopram oxalate (LEXAPRO) 20 MG tablet Take 1 tablet (20 mg total) by mouth once daily. 90 tablet 3    estradiol 1.25 gram/actuation topical gel Place 1.25 g onto the skin once daily.      multivitamin capsule Take 1 capsule by mouth once daily.      sumatriptan (IMITREX) 25 MG Tab Take 1 tablet (25 mg total) by mouth every 2 (two) hours as needed. 9 tablet 4    ubrogepant (UBRELVY) 100 mg tablet Take 1 tablet (100 mg total) by mouth as needed for Migraine. If symptoms persist or return, may repeat dose after 2 hours. Maximum: 200 mg per 24 hours 9 tablet 1     No facility-administered encounter medications on file as of 10/2/2023.        Review of patient's allergies indicates:  No Known Allergies    Physical Exam       ]    Physical Exam  Constitutional:       General: She is not in acute distress.     Appearance: She is well-developed.   HENT:      Head: Normocephalic and atraumatic.   Pulmonary:      Effort: Pulmonary effort is normal.   Musculoskeletal:      Cervical back: Normal range of motion.   Skin:     Findings: No rash.   Neurological:      Mental Status: She is alert and oriented to person, place, and time.      Coordination: Coordination normal.   Psychiatric:         Behavior: Behavior normal.         Thought Content: Thought content normal.         Judgment: Judgment normal.          Laboratory:  CBC:  No results for input(s): "WBC", "RBC", "HGB", "HCT", "PLT", "MCV", "MCH", "MCHC" in the last 2160 hours.  CMP:  No results for input(s): "GLU", "CALCIUM", "ALBUMIN", "PROT", "NA", "K", "CO2", "CL", "BUN", "ALKPHOS", "ALT", "AST", "BILITOT" in the last 2160 hours.    Invalid input(s): "CREATININ"  URINALYSIS:  No results for input(s): "COLORU", "CLARITYU", "SPECGRAV", "PHUR", "PROTEINUA", "GLUCOSEU", "BILIRUBINCON", "BLOODU", "WBCU", "RBCU", "BACTERIA", "MUCUS", "NITRITE", "LEUKOCYTESUR", "UROBILINOGEN", " ""HYALINECASTS" in the last 2160 hours.   LIPIDS:  No results for input(s): "TSH", "HDL", "CHOL", "TRIG", "LDLCALC", "CHOLHDL", "NONHDLCHOL", "TOTALCHOLEST" in the last 2160 hours.  TSH:  No results for input(s): "TSH" in the last 2160 hours.  A1C:  No results for input(s): "HGBA1C" in the last 2160 hours.    Radiology:  No results found in the last 30 days.     Assessment/Plan     Judy Foley is a 57 y.o.female with:    1. Migraine without aura and without status migrainosus, not intractable  - ubrogepant (UBRELVY) 100 mg tablet; Take 1 tablet (100 mg total) by mouth as needed for Migraine. If symptoms persist or return, may repeat dose after 2 hours. Maximum: 200 mg per 24 hours  Dispense: 9 tablet; Refill: 1    -start flonase, antihistamine and nasal saline spray  -try ubrelvy PRN for severe HA's  -may need PPX migraine med if not improving  -Continue current medications and maintain follow up with specialists.    -Follow up if symptoms worsen or fail to improve.       Rachel Wise MD  [facility]  Answers submitted by the patient for this visit:  Review of Systems Questionnaire (Submitted on 9/29/2023)  activity change: No  unexpected weight change: No  rhinorrhea: No  trouble swallowing: No  visual disturbance: No  chest tightness: No  polyuria: No  difficulty urinating: No  menstrual problem: No  joint swelling: No  arthralgias: Yes  confusion: No  dysphoric mood: No    "

## 2023-10-03 RX ORDER — RIMEGEPANT SULFATE 75 MG/75MG
75 TABLET, ORALLY DISINTEGRATING ORAL DAILY PRN
Qty: 9 TABLET | Refills: 1 | Status: SHIPPED | OUTPATIENT
Start: 2023-10-03 | End: 2023-12-26 | Stop reason: SDUPTHER

## 2023-10-03 NOTE — TELEPHONE ENCOUNTER
Is it 1 po qd prn?    currently rate controlled  restarted anticoagulation xarelto and ASA  Patient has remained rate controlled

## 2023-11-02 ENCOUNTER — PATIENT MESSAGE (OUTPATIENT)
Dept: PRIMARY CARE CLINIC | Facility: CLINIC | Age: 58
End: 2023-11-02
Payer: COMMERCIAL

## 2023-11-10 RX ORDER — BUTALBITAL, ACETAMINOPHEN AND CAFFEINE 50; 325; 40 MG/1; MG/1; MG/1
1 TABLET ORAL EVERY 6 HOURS PRN
Qty: 30 TABLET | Refills: 5 | Status: SHIPPED | OUTPATIENT
Start: 2023-11-10 | End: 2023-12-11

## 2023-11-10 NOTE — TELEPHONE ENCOUNTER
No care due was identified.  Health [facility] Embedded Care Due Messages. Reference number: [id number].   11/10/2023 4:06:23 AM CST

## 2023-12-10 NOTE — TELEPHONE ENCOUNTER
No care due was identified.  [employer] Embedded Care Due Messages. Reference number: [id number].   12/10/2023 4:08:26 AM CST

## 2023-12-11 RX ORDER — BUTALBITAL, ACETAMINOPHEN AND CAFFEINE 50; 325; 40 MG/1; MG/1; MG/1
1 TABLET ORAL EVERY 6 HOURS PRN
Qty: 30 TABLET | Refills: 5 | Status: SHIPPED | OUTPATIENT
Start: 2023-12-11 | End: 2024-01-10 | Stop reason: SDUPTHER

## 2023-12-26 DIAGNOSIS — G43.009 MIGRAINE WITHOUT AURA AND WITHOUT STATUS MIGRAINOSUS, NOT INTRACTABLE: ICD-10-CM

## 2023-12-26 RX ORDER — RIMEGEPANT SULFATE 75 MG/75MG
75 TABLET, ORALLY DISINTEGRATING ORAL DAILY PRN
Qty: 9 TABLET | Refills: 1 | Status: SHIPPED | OUTPATIENT
Start: 2023-12-26 | End: 2024-02-23 | Stop reason: SDUPTHER

## 2024-01-10 DIAGNOSIS — F41.9 ANXIETY: ICD-10-CM

## 2024-01-10 RX ORDER — BUTALBITAL, ACETAMINOPHEN AND CAFFEINE 50; 325; 40 MG/1; MG/1; MG/1
1 TABLET ORAL EVERY 6 HOURS PRN
Qty: 30 TABLET | Refills: 5 | Status: SHIPPED | OUTPATIENT
Start: 2024-01-10 | End: 2024-01-16

## 2024-01-10 RX ORDER — ESCITALOPRAM OXALATE 20 MG/1
20 TABLET ORAL DAILY
Qty: 90 TABLET | Refills: 3 | Status: SHIPPED | OUTPATIENT
Start: 2024-01-10 | End: 2025-01-09

## 2024-01-10 NOTE — TELEPHONE ENCOUNTER
No care due was identified.  Health [facility] Embedded Care Due Messages. Reference number: [id number].   1/10/2024 2:48:59 PM CST

## 2024-01-10 NOTE — TELEPHONE ENCOUNTER
No care due was identified.  Health [facility] Embedded Care Due Messages. Reference number: [id number].   1/10/2024 2:48:44 PM CST

## 2024-01-14 NOTE — TELEPHONE ENCOUNTER
No care due was identified.  Health [facility] Embedded Care Due Messages. Reference number: [id number].   1/14/2024 4:08:24 AM CST

## 2024-01-16 RX ORDER — BUTALBITAL, ACETAMINOPHEN AND CAFFEINE 50; 325; 40 MG/1; MG/1; MG/1
1 TABLET ORAL EVERY 6 HOURS PRN
Qty: 30 TABLET | Refills: 5 | Status: SHIPPED | OUTPATIENT
Start: 2024-01-16 | End: 2024-02-12 | Stop reason: SDUPTHER

## 2024-02-12 RX ORDER — BUTALBITAL, ACETAMINOPHEN AND CAFFEINE 50; 325; 40 MG/1; MG/1; MG/1
1 TABLET ORAL EVERY 6 HOURS PRN
Qty: 30 TABLET | Refills: 0 | Status: SHIPPED | OUTPATIENT
Start: 2024-02-12 | End: 2024-02-14

## 2024-02-12 NOTE — TELEPHONE ENCOUNTER
No care due was identified.  Health [facility] Embedded Care Due Messages. Reference number: [id number].   2/12/2024 1:03:11 PM CST

## 2024-02-13 NOTE — TELEPHONE ENCOUNTER
No care due was identified.  [facility] Embedded Care Due Messages. Reference number: [id number].   2/13/2024 4:08:27 AM CST

## 2024-02-14 RX ORDER — BUTALBITAL, ACETAMINOPHEN AND CAFFEINE 50; 325; 40 MG/1; MG/1; MG/1
1 TABLET ORAL EVERY 6 HOURS PRN
Qty: 30 TABLET | Refills: 0 | Status: SHIPPED | OUTPATIENT
Start: 2024-02-14 | End: 2024-02-19

## 2024-02-18 NOTE — TELEPHONE ENCOUNTER
No care due was identified.  Health [facility] Embedded Care Due Messages. Reference number: [id number].   2/18/2024 4:08:28 AM CST

## 2024-02-19 RX ORDER — BUTALBITAL, ACETAMINOPHEN AND CAFFEINE 50; 325; 40 MG/1; MG/1; MG/1
1 TABLET ORAL EVERY 6 HOURS PRN
Qty: 30 TABLET | Refills: 0 | Status: SHIPPED | OUTPATIENT
Start: 2024-02-19 | End: 2024-02-23 | Stop reason: SDUPTHER

## 2024-02-23 DIAGNOSIS — G43.009 MIGRAINE WITHOUT AURA AND WITHOUT STATUS MIGRAINOSUS, NOT INTRACTABLE: ICD-10-CM

## 2024-02-23 NOTE — TELEPHONE ENCOUNTER
No care due was identified.  [facility] Embedded Care Due Messages. Reference number: [id number].   2/23/2024 11:23:19 AM CST

## 2024-02-25 RX ORDER — RIMEGEPANT SULFATE 75 MG/75MG
75 TABLET, ORALLY DISINTEGRATING ORAL DAILY PRN
Qty: 9 TABLET | Refills: 1 | Status: SHIPPED | OUTPATIENT
Start: 2024-02-25

## 2024-02-25 RX ORDER — BUTALBITAL, ACETAMINOPHEN AND CAFFEINE 50; 325; 40 MG/1; MG/1; MG/1
1 TABLET ORAL EVERY 6 HOURS PRN
Qty: 30 TABLET | Refills: 0 | Status: SHIPPED | OUTPATIENT
Start: 2024-02-25 | End: 2024-03-01

## 2024-03-01 RX ORDER — BUTALBITAL, ACETAMINOPHEN AND CAFFEINE 50; 325; 40 MG/1; MG/1; MG/1
1 TABLET ORAL EVERY 6 HOURS PRN
Qty: 30 TABLET | Refills: 0 | Status: SHIPPED | OUTPATIENT
Start: 2024-03-01 | End: 2024-03-07

## 2024-03-01 NOTE — TELEPHONE ENCOUNTER
No care due was identified.  Health [facility] Embedded Care Due Messages. Reference number: [id number].   3/01/2024 4:07:53 AM CST

## 2024-03-07 RX ORDER — BUTALBITAL, ACETAMINOPHEN AND CAFFEINE 50; 325; 40 MG/1; MG/1; MG/1
1 TABLET ORAL EVERY 6 HOURS PRN
Qty: 30 TABLET | Refills: 0 | Status: SHIPPED | OUTPATIENT
Start: 2024-03-07 | End: 2024-03-14 | Stop reason: SDUPTHER

## 2024-03-07 NOTE — TELEPHONE ENCOUNTER
No care due was identified.  Health [facility] Embedded Care Due Messages. Reference number: [id number].   3/07/2024 4:07:57 AM CST

## 2024-03-07 NOTE — TELEPHONE ENCOUNTER
Refill Routing Note   Medication(s) are not appropriate for processing by [facility] for the following reason(s):        Outside of protocol    [facility] action(s):  Route             Appointments  past 12m or future 3m with PCP    Date Provider   Last Visit   10/2/2023 Rachel Wise MD   Next Visit   4/18/2024 Rachel Wise MD   ED visits in past 90 days: 0        Note composed:7:53 AM 03/07/2024

## 2024-03-15 RX ORDER — BUTALBITAL, ACETAMINOPHEN AND CAFFEINE 50; 325; 40 MG/1; MG/1; MG/1
1 TABLET ORAL EVERY 6 HOURS PRN
Qty: 30 TABLET | Refills: 0 | Status: SHIPPED | OUTPATIENT
Start: 2024-03-15 | End: 2024-03-22 | Stop reason: SDUPTHER

## 2024-03-22 RX ORDER — BUTALBITAL, ACETAMINOPHEN AND CAFFEINE 50; 325; 40 MG/1; MG/1; MG/1
1 TABLET ORAL EVERY 6 HOURS PRN
Qty: 30 TABLET | Refills: 0 | Status: SHIPPED | OUTPATIENT
Start: 2024-03-22 | End: 2024-03-28 | Stop reason: SDUPTHER

## 2024-03-22 RX ORDER — SUMATRIPTAN SUCCINATE 25 MG/1
25 TABLET ORAL
Qty: 9 TABLET | Refills: 4 | Status: SHIPPED | OUTPATIENT
Start: 2024-03-22

## 2024-03-22 NOTE — TELEPHONE ENCOUNTER
No care due was identified.  [facility] Embedded Care Due Messages. Reference number: [id number].   3/22/2024 9:22:40 AM CDT

## 2024-03-28 RX ORDER — BUTALBITAL, ACETAMINOPHEN AND CAFFEINE 50; 325; 40 MG/1; MG/1; MG/1
1 TABLET ORAL EVERY 6 HOURS PRN
Qty: 30 TABLET | Refills: 0 | Status: SHIPPED | OUTPATIENT
Start: 2024-03-28 | End: 2024-04-23

## 2024-03-28 NOTE — TELEPHONE ENCOUNTER
No care due was identified.  [facility] Embedded Care Due Messages. Reference number: [id number].   3/28/2024 9:17:58 AM CDT

## 2024-04-03 DIAGNOSIS — Z12.31 OTHER SCREENING MAMMOGRAM: ICD-10-CM

## 2024-04-10 ENCOUNTER — PATIENT MESSAGE (OUTPATIENT)
Dept: ADMINISTRATIVE | Facility: HOSPITAL | Age: 59
End: 2024-04-10
Payer: COMMERCIAL

## 2024-04-23 RX ORDER — BUTALBITAL, ACETAMINOPHEN AND CAFFEINE 50; 325; 40 MG/1; MG/1; MG/1
1 TABLET ORAL EVERY 6 HOURS PRN
Qty: 30 TABLET | Refills: 5 | Status: SHIPPED | OUTPATIENT
Start: 2024-04-23 | End: 2024-05-08

## 2024-04-23 NOTE — TELEPHONE ENCOUNTER
No care due was identified.  Health [facility] Embedded Care Due Messages. Reference number: [id number].   4/23/2024 4:09:29 AM CDT

## 2024-05-08 RX ORDER — BUTALBITAL, ACETAMINOPHEN AND CAFFEINE 50; 325; 40 MG/1; MG/1; MG/1
1 TABLET ORAL EVERY 6 HOURS PRN
Qty: 30 TABLET | Refills: 5 | Status: SHIPPED | OUTPATIENT
Start: 2024-05-08

## 2024-05-08 NOTE — TELEPHONE ENCOUNTER
No care due was identified.  Health [facility] Embedded Care Due Messages. Reference number: [id number].   5/08/2024 4:11:03 AM CDT

## 2024-06-07 ENCOUNTER — HOSPITAL ENCOUNTER (EMERGENCY)
Facility: HOSPITAL | Age: 59
Discharge: HOME | End: 2024-06-08
Attending: STUDENT IN AN ORGANIZED HEALTH CARE EDUCATION/TRAINING PROGRAM
Payer: COMMERCIAL

## 2024-06-07 VITALS
HEIGHT: 63 IN | RESPIRATION RATE: 16 BRPM | TEMPERATURE: 98 F | OXYGEN SATURATION: 99 % | DIASTOLIC BLOOD PRESSURE: 80 MMHG | SYSTOLIC BLOOD PRESSURE: 119 MMHG | BODY MASS INDEX: 30.12 KG/M2 | WEIGHT: 170 LBS | HEART RATE: 63 BPM

## 2024-06-07 DIAGNOSIS — S99.921A RIGHT FOOT INJURY, INITIAL ENCOUNTER: ICD-10-CM

## 2024-06-07 DIAGNOSIS — S96.821A LACERATION OF EXTENSOR TENDON OF RIGHT FOOT, INITIAL ENCOUNTER: Primary | ICD-10-CM

## 2024-06-07 PROCEDURE — 12001 RPR S/N/AX/GEN/TRNK 2.5CM/<: CPT

## 2024-06-07 PROCEDURE — 99283 EMERGENCY DEPT VISIT LOW MDM: CPT | Mod: 25

## 2024-06-07 PROCEDURE — 25000003 PHARM REV CODE 250: Performed by: NURSE PRACTITIONER

## 2024-06-07 RX ORDER — OXYCODONE AND ACETAMINOPHEN 5; 325 MG/1; MG/1
1 TABLET ORAL
Status: COMPLETED | OUTPATIENT
Start: 2024-06-07 | End: 2024-06-07

## 2024-06-07 RX ORDER — OXYCODONE AND ACETAMINOPHEN 5; 325 MG/1; MG/1
1 TABLET ORAL EVERY 8 HOURS PRN
Qty: 10 TABLET | Refills: 0 | Status: SHIPPED | OUTPATIENT
Start: 2024-06-07 | End: 2024-06-11 | Stop reason: SDUPTHER

## 2024-06-07 RX ORDER — LIDOCAINE HYDROCHLORIDE 10 MG/ML
5 INJECTION, SOLUTION EPIDURAL; INFILTRATION; INTRACAUDAL; PERINEURAL
Status: DISCONTINUED | OUTPATIENT
Start: 2024-06-07 | End: 2024-06-08 | Stop reason: HOSPADM

## 2024-06-07 RX ADMIN — OXYCODONE HYDROCHLORIDE AND ACETAMINOPHEN 1 TABLET: 5; 325 TABLET ORAL at 09:06

## 2024-06-08 NOTE — FIRST PROVIDER EVALUATION
Emergency Department TeleTriage Encounter Note      CHIEF COMPLAINT    Chief Complaint   Patient presents with    Foot Injury     Pt was chopped veggies and the knife slipped and fell on right foot. Stabbed foot but did stick into foot, pt states UTD on vaccines. States pain to foot that radiated up to ankle and unable to move right great toe        VITAL SIGNS   Initial Vitals [06/07/24 1938]   BP Pulse Resp Temp SpO2   119/80 63 18 98.1 °F (36.7 °C) 99 %      MAP       --            ALLERGIES    Review of patient's allergies indicates:  No Known Allergies    PROVIDER TRIAGE NOTE  Patient presents with complaint of dropped knife and cut her foot.      Phy:   Constitutional: well nourished, well developed, appearing stated age, NAD        Initial orders will be placed and care will be transferred to an alternate provider when patient is roomed for a full evaluation. Any additional orders and the final disposition will be determined by that provider.        ORDERS  Labs Reviewed - No data to display    ED Orders (720h ago, onward)      None              Virtual Visit Note: The provider triage portion of this emergency department evaluation and documentation was performed via [employer], a HIPAA-compliant telemedicine application, in concert with a tele-presenter in the room. A face to face patient evaluation with one of my colleagues will occur once the patient is placed in an emergency department room.      DISCLAIMER: This note was prepared with [employer] voice recognition transcription software. Garbled syntax, mangled pronouns, and other bizarre constructions may be attributed to that software system.

## 2024-06-08 NOTE — ED TRIAGE NOTES
Patient states that she sustained a laceration to the top of her right foot when a knife fell onto her foot just PTA. States pain radiates to ankle and reports increased pain when moving her 1st and 2nd toes. Unable to bear weight s/t pain. Wearing socks at the time. Presents awake, alert.

## 2024-06-09 NOTE — ED PROVIDER NOTES
ED Provider Note - 6/7/2024    History     Chief Complaint   Patient presents with    Foot Injury     Pt was chopped veggies and the knife slipped and fell on right foot. Stabbed foot but did stick into foot, pt states UTD on vaccines. States pain to foot that radiated up to ankle and unable to move right great toe        HPI     Judy Foley is a 58 y.o. year old female with past medical and surgical history as seen below, presenting with chief complaint of laceration. Dropped knife with penetrating trauma along course of 1st metatarsal. Difficulty with movement of right first toe since that time.        Past Medical History:   Diagnosis Date    GERD (gastroesophageal reflux disease)      Past Surgical History:   Procedure Laterality Date    BUNIONECTOMY      CHOLECYSTECTOMY      colonoscopy  12/18/2017    Normal    EXCISION OF GANGLION CYST OF HAND Left 09/22/2020    Procedure: EXCISION, GANGLION CYST, HAND;  Surgeon: Ulysses Turner Jr., MD;  Location: [facility] OR;  Service: Orthopedics;  Laterality: Left;    HERNIA REPAIR      HYSTERECTOMY      ROTATOR CUFF REPAIR Left     TRIGGER FINGER RELEASE Left 09/22/2020    Procedure: RELEASE, TRIGGER FINGER;  Surgeon: Ulysses Turner Jr., MD;  Location: [facility] OR;  Service: Orthopedics;  Laterality: Left;         Family History   Problem Relation Name Age of Onset    Pulmonary fibrosis Mother Cindy Falgout     Arthritis Mother Cindy Leon     Cancer Mother Cindy Leon     Heart disease Father      Intellectual disability Daughter Izzy Foley     Heart disease Daughter Izzy Foley     Heart disease Daughter Izzy Foley      Social History     Tobacco Use    Smoking status: Never    Smokeless tobacco: Never   Substance Use Topics    Alcohol use: No    Drug use: Never     Social Determinants of Health with Concerns     Alcohol Use: Not on file   Financial Resource Strain: Not on file   Food Insecurity: Not on file   Transportation Needs: Not on file   Physical  "Activity: Not on file   Stress: Not on file   Housing Stability: Not on file   Utilities: Not on file   Health Literacy: Not on file   Social Isolation: Not on file      Review of patient's allergies indicates:  No Known Allergies    Review of Systems     A full Review of Systems (ROS) was performed and was negative unless otherwise stated in the HPI.      Physical Exam     Vitals:    06/07/24 1938 06/07/24 2158   BP: 119/80    Pulse: 63    Resp: 18 16   Temp: 98.1 °F (36.7 °C)    SpO2: 99%    Weight: 77.1 kg (170 lb)    Height: 5' 3" (1.6 m)         Physical Exam    Nursing note and vitals reviewed.  Constitutional: She appears well-developed and well-nourished.   HENT:   Head: Normocephalic and atraumatic.   Right Ear: External ear normal.   Left Ear: External ear normal.   Nose: Nose normal.   Eyes: Conjunctivae and EOM are normal. Pupils are equal, round, and reactive to light.   Neck: No tracheal deviation present.   Normal range of motion.  Cardiovascular:  Normal rate and regular rhythm.           Pulmonary/Chest: No respiratory distress. She has no wheezes.   Musculoskeletal:         General: No edema. Normal range of motion.      Cervical back: Normal range of motion.      Comments: No strength of extension in right 1st toe.     Neurological: She is alert and oriented to person, place, and time. She has normal strength. No cranial nerve deficit or sensory deficit. Gait normal.   Skin: Skin is warm and dry. Laceration (1.5 cm overlying 1st metatarsal right foot.) noted.   Psychiatric: She has a normal mood and affect. Her behavior is normal. Thought content normal.         Lab Results- Independently reviewed by myself      Labs Reviewed - No data to display        Imaging     Imaging Results              X-Ray Foot Complete Right (Final result)  Result time 06/07/24 21:45:36      Final result by Shreya Saxena MD (06/07/24 21:45:36)                   Impression:      No acute " abnormality.    Postoperative changes involving the 1st and 2nd metatarsal.      Electronically signed by: Shreya Hemanth  Date:    06/07/2024  Time:    21:45               Narrative:    EXAMINATION:  XR FOOT COMPLETE 3 VIEW RIGHT    CLINICAL HISTORY:  . Unspecified injury of right foot, initial encounter    TECHNIQUE:  AP, lateral, and oblique views of the right foot were performed.    COMPARISON:  None    FINDINGS:  Postoperative changes of osteotomy involving the great toe metatarsal noted.  Solitary screw also overlies the head of the 2nd metatarsal areas degenerative changes noted involving the great toe MTP joint.  Soft tissues are intact.                                    X-Rays:   Independently Interpreted Readings:   Other Readings:  No acute fractures or dislocations                ED Course         Lac Repair    Date/Time: 6/9/2024 6:26 AM    Performed by: Bhargav Thakkar MD  Authorized by: Bhargav Thakkar MD    Consent:     Consent obtained:  Verbal    Consent given by:  Parent    Risks, benefits, and alternatives were discussed: yes      Risks discussed:  Need for additional repair, infection and pain    Alternatives discussed:  No treatment and observation  Universal protocol:     Procedure explained and questions answered to patient or proxy's satisfaction: yes      Patient identity confirmed:  Verbally with patient and hospital-assigned identification number  Anesthesia:     Anesthesia method:  None  Laceration details:     Location:  Foot    Foot location:  Top of R foot    Length (cm):  1.5  Exploration:     Limited defect created (wound extended): no      Hemostasis achieved with:  Direct pressure    Wound exploration: wound explored through full range of motion and entire depth of wound visualized      Contaminated: no    Treatment:     Area cleansed with:  Saline    Amount of cleaning:  Standard    Irrigation method:  Pressure wash    Debridement:  None    Undermining:  None  Skin repair:      Repair method:  Tissue adhesive  Approximation:     Approximation:  Close  Repair type:     Repair type:  Simple  Post-procedure details:     Dressing:  Open (no dressing)    Procedure completion:  Tolerated well, no immediate complications           Orders Placed This Encounter    ORTHOPEDIC BRACING FOR HOME USE - LOWER EXTREMITY    X-Ray Foot Complete Right    Ambulatory referral/consult to Podiatry    oxyCODONE-acetaminophen 5-325 mg per tablet 1 tablet    oxyCODONE-acetaminophen (PERCOCET) 5-325 mg per tablet          ED Course as of 06/09/24 0620   Fri Jun 07, 2024 2152 FINDINGS:  Postoperative changes of osteotomy involving the great toe metatarsal noted.  Solitary screw also overlies the head of the 2nd metatarsal areas degenerative changes noted involving the great toe MTP joint.  Soft tissues are intact.     Impression:     No acute abnormality.     Postoperative changes involving the 1st and 2nd metatarsal.      [DT]      ED Course User Index  [DT] Bettye Jensen NP              Medical Decision Making       The patient's list of active medical problems, social history, medications, and allergies as documented per RN staff has been reviewed.           Medical Decision Making  This is a 58 y.o. female presenting with a laceration of the right foot. Wound inspected under direct bright light with good visualization.  Laceration repaired in fashion described above.  No evidence of foreign body. Concern for tendon involvement. Patient tolerated procedure well and neurovascular exam intact and unchanged post repair with intact distal pulses and cap refill. Cautious return precautions discussed w/ full understanding. Wound care discussed. Follow up with Podiatry for further eval of tendon injury.       Amount and/or Complexity of Data Reviewed  Radiology: ordered and independent interpretation performed.    Risk  Prescription drug management.                    ED Prescriptions       Medication Sig Dispense  Start Date End Date Auth. Provider    oxyCODONE-acetaminophen (PERCOCET) 5-325 mg per tablet Take 1 tablet by mouth every 8 (eight) hours as needed for Pain. 10 tablet 6/7/2024 -- Bhargav Thakkar MD              Clinical Impression       Follow-up Information       Follow up With Specialties Details Why Contact Info Additional Information    [facility] Podiatry Podiatry Schedule an appointment as soon as possible for a visit in 3 days For follow-up on today's visit. [address]  [address]  [place] [address] [phone] Please park in [address] or [address] and use [address] lauren. Take Medical Office Bldg elevators.    [facility] Emergency Dept Emergency Medicine Go to  As needed, If symptoms worsen [address] [address] [phone]             Referrals:  Orders Placed This Encounter   Procedures    Ambulatory referral/consult to Podiatry     Standing Status:   Future     Standing Expiration Date:   7/7/2025     Referral Priority:   Urgent     Referral Type:   Consultation     Referral Reason:   Specialty Services Required     Requested Specialty:   Podiatry     Number of Visits Requested:   1       Disposition   ED Disposition Condition    Discharge Stable            Diagnosis    ICD-10-CM ICD-9-CM   1. Laceration of extensor tendon of right foot, initial encounter  S96.821A 892.2   2. Right foot injury, initial encounter  S99.921A 959.7           Bhargav Thakkar MD        06/09/2024          DISCLAIMER: This note was prepared with [employer] voice recognition transcription software. Garbled syntax, mangled pronouns, and other bizarre constructions may be attributed to that software system.       Bhargav Thakkar MD  06/09/24 0633

## 2024-06-11 ENCOUNTER — PATIENT MESSAGE (OUTPATIENT)
Dept: PODIATRY | Facility: CLINIC | Age: 59
End: 2024-06-11

## 2024-06-11 ENCOUNTER — OFFICE VISIT (OUTPATIENT)
Dept: PODIATRY | Facility: CLINIC | Age: 59
End: 2024-06-11
Payer: COMMERCIAL

## 2024-06-11 VITALS — HEIGHT: 63 IN | WEIGHT: 170 LBS | BODY MASS INDEX: 30.12 KG/M2

## 2024-06-11 DIAGNOSIS — S96.821A LACERATION OF EXTENSOR TENDON OF RIGHT FOOT, INITIAL ENCOUNTER: ICD-10-CM

## 2024-06-11 PROCEDURE — 99999 PR PBB SHADOW E&M-EST. PATIENT-LVL IV: CPT | Mod: PBBFAC,,, | Performed by: PODIATRIST

## 2024-06-11 PROCEDURE — 99203 OFFICE O/P NEW LOW 30 MIN: CPT | Mod: S$GLB,,, | Performed by: PODIATRIST

## 2024-06-11 RX ORDER — OXYCODONE AND ACETAMINOPHEN 5; 325 MG/1; MG/1
1 TABLET ORAL EVERY 8 HOURS PRN
Qty: 30 TABLET | Refills: 0 | Status: SHIPPED | OUTPATIENT
Start: 2024-06-11

## 2024-06-11 RX ORDER — FAMOTIDINE 40 MG/1
40 TABLET, FILM COATED ORAL NIGHTLY
COMMUNITY
Start: 2024-05-06

## 2024-06-11 RX ORDER — PANTOPRAZOLE SODIUM 40 MG/1
40 TABLET, DELAYED RELEASE ORAL EVERY MORNING
COMMUNITY
Start: 2024-05-07

## 2024-06-12 ENCOUNTER — PATIENT MESSAGE (OUTPATIENT)
Dept: PRIMARY CARE CLINIC | Facility: CLINIC | Age: 59
End: 2024-06-12
Payer: COMMERCIAL

## 2024-06-16 ENCOUNTER — PATIENT MESSAGE (OUTPATIENT)
Dept: PODIATRY | Facility: CLINIC | Age: 59
End: 2024-06-16
Payer: COMMERCIAL

## 2024-06-17 NOTE — PROGRESS NOTES
Subjective:      Patient ID: Judy Foley is a 58 y.o. female.    Chief Complaint:   Wound Care (Right medial top of right )    Judy is a 58 y.o. female who presents to the podiatry clinic  with complaint of  right foot pain. Onset of the symptoms was several days ago. Precipitating event: injured with a kitchen knife . Current symptoms include: inability to bear weight. Aggravating factors: any weight bearing. Symptoms have gradually worsened. Patient has had no prior foot problems. Evaluation to date: none. Treatment to date: avoidance of offending activity. Patients rates pain 8/10 on pain scale.    Review of Systems   Constitutional: Negative for chills, decreased appetite, fever and malaise/fatigue.   HENT:  Negative for congestion, hearing loss, nosebleeds and tinnitus.    Eyes:  Negative for double vision, pain, photophobia and visual disturbance.   Cardiovascular:  Negative for chest pain, claudication, cyanosis and leg swelling.   Respiratory:  Negative for cough, hemoptysis, shortness of breath and wheezing.    Endocrine: Negative for cold intolerance and heat intolerance.   Hematologic/Lymphatic: Negative for adenopathy and bleeding problem.   Skin:  Negative for color change, dry skin, itching, nail changes and suspicious lesions.   Musculoskeletal:  Positive for joint pain, joint swelling and stiffness. Negative for arthritis.   Gastrointestinal:  Negative for abdominal pain, jaundice, nausea and vomiting.   Genitourinary:  Negative for dysuria, frequency and hematuria.   Neurological:  Negative for difficulty with concentration, loss of balance, numbness, paresthesias and sensory change.   Psychiatric/Behavioral:  Negative for altered mental status, hallucinations and suicidal ideas. The patient is not nervous/anxious.    Allergic/Immunologic: Negative for environmental allergies and persistent infections.           Objective:      Physical Exam  Vitals reviewed.   Constitutional:       Appearance: She  is well-developed.   HENT:      Head: Normocephalic and atraumatic.   Cardiovascular:      Pulses:           Dorsalis pedis pulses are 2+ on the right side and 2+ on the left side.        Posterior tibial pulses are 2+ on the right side and 2+ on the left side.   Pulmonary:      Effort: Pulmonary effort is normal.   Musculoskeletal:         General: Normal range of motion.      Comments: Left extensor hallucis longus tendon noted to be absent with proximal retraction and laceration noted dorsally.      0/4 muscle strength.  All other muscle groups normal muscle strength bilateral.   Skin:     General: Skin is warm and dry.      Capillary Refill: Capillary refill takes 2 to 3 seconds.      Comments: Skin turgor is normal bilaterally.  Skin texture is well hydrated to both lower extremities.  No lesions or rashes or wounds appreciated bilaterally.  Nail plates 1 through 5 bilaterally are within normal limits for length and thickness.  No nail clubbing or incurvation noted.   Neurological:      Mental Status: She is alert and oriented to person, place, and time.      Comments: Sharp dull light touch vibratory proprioceptive sensation are intact bilaterally.  Deep tendon reflexes to patellar and Achilles tendon are symmetrical 2 over 4 bilaterally.  No ankle clonus or Babinski reflexes noted bilaterally.  Coordination is normal to both feet and lower extremities.   Psychiatric:         Behavior: Behavior normal.               Assessment:       Encounter Diagnosis   Name Primary?    Laceration of extensor tendon of right foot, initial encounter      Independent visualization of imaging was performed.  Results were reviewed in detail with patient.       Plan:       Judy was seen today for wound care.    Diagnoses and all orders for this visit:    Laceration of extensor tendon of right foot, initial encounter  -     Ambulatory referral/consult to Podiatry  -     HME - OTHER  -     MRI Foot (Midfoot) Left Without Contrast;  Future  -     oxyCODONE-acetaminophen (PERCOCET) 5-325 mg per tablet; Take 1 tablet by mouth every 8 (eight) hours as needed for Pain.  -     Case Request Operating Room: REPAIR, TENDON, EXTENSOR  -     HME - OTHER      I counseled the patient on her conditions, their implications and medical management.      The nature of the condition, options for management, as well as potential risks and complications were discussed in detail with patient. Patient was amenable to my recommendations and left my office fully informed and will follow up as instructed or sooner if necessary.      MRI ordered right foot.  Begin walking boot.  Case request submitted for extensor tendon repair.    The patient has decided to forego any further conservative treatment and opted for surgical intervention.  Alternative treatments, and benefits of surgery were discussed with the patient.  Risks and complications, including but not limited to: pain, swelling, numbness, infection, failure to achieve the stated goals, recurrence of problem, nerve and blood vessel damage, scar tissue formation, further need of surgery, loss of limb or life, was discussed in detail with the patient.  All questions asked were answered, and written informed consent was sign and received. The patient will undergo extensor tendon repair left foot.

## 2024-06-19 ENCOUNTER — TELEPHONE (OUTPATIENT)
Dept: PODIATRY | Facility: CLINIC | Age: 59
End: 2024-06-19
Payer: COMMERCIAL

## 2024-06-19 ENCOUNTER — PATIENT MESSAGE (OUTPATIENT)
Dept: PRIMARY CARE CLINIC | Facility: CLINIC | Age: 59
End: 2024-06-19
Payer: COMMERCIAL

## 2024-06-19 ENCOUNTER — TELEPHONE (OUTPATIENT)
Dept: PRIMARY CARE CLINIC | Facility: CLINIC | Age: 59
End: 2024-06-19
Payer: COMMERCIAL

## 2024-06-19 DIAGNOSIS — M96.89 DISORDER OF TENDON REPAIR: Primary | ICD-10-CM

## 2024-06-19 DIAGNOSIS — M76.60 ACHILLES TENDINITIS, UNSPECIFIED LATERALITY: ICD-10-CM

## 2024-06-19 DIAGNOSIS — S96.821A LACERATION OF EXTENSOR TENDON OF RIGHT FOOT, INITIAL ENCOUNTER: ICD-10-CM

## 2024-06-19 NOTE — TELEPHONE ENCOUNTER
----- Message from Dania Moralez MA sent at 6/19/2024  3:02 PM CDT -----  Regarding: surgery scheduler  Judy Foley is scheduled to have a REPAIR, TENDON, EXTENSOR Left General with Dr. Kurt Gomez   on 8/2.  We request surgical clearance.  The patient has been instructed to contact your office; please assist in scheduling any day after July 2. We request a  EKG, CBC, BMP, and any other testing deemed necessary by your office. When completed please note chart  stating whether the patient is cleared., along with any results to  attention Dania Moralez.  Please contact us if you have any questions.  Our office number is [phone].    Sincerely,      Dania Moralez MA  Foot and Ankle Surgery  [facility], Department of Podiatry

## 2024-06-19 NOTE — TELEPHONE ENCOUNTER
Left voice message for patient to give our office a call back at  or [phone]. Help with scheduling Sx for Dr. Gomez.

## 2024-06-19 NOTE — TELEPHONE ENCOUNTER
.Spoke to pt and discussed 8/2 surgery date. Also advised pt to contact their PCP to schedule an appointment to be cleared for surgery. Then advised pt that someone will call them the day before surgery between 3p-5p with the surgery arrival time and instructions. Pt verbalized understanding and call ended.

## 2024-06-23 DIAGNOSIS — S96.821A LACERATION OF EXTENSOR TENDON OF RIGHT FOOT, INITIAL ENCOUNTER: ICD-10-CM

## 2024-06-24 ENCOUNTER — TELEPHONE (OUTPATIENT)
Dept: PRIMARY CARE CLINIC | Facility: CLINIC | Age: 59
End: 2024-06-24
Payer: COMMERCIAL

## 2024-06-24 RX ORDER — OXYCODONE AND ACETAMINOPHEN 5; 325 MG/1; MG/1
1 TABLET ORAL EVERY 8 HOURS PRN
Qty: 30 TABLET | Refills: 0 | Status: SHIPPED | OUTPATIENT
Start: 2024-06-24

## 2024-06-24 NOTE — TELEPHONE ENCOUNTER
----- Message from Katy Lopez sent at 6/24/2024 11:44 AM CDT -----  Regarding: Call back  Contact: [phone]  Type:  Pharmacy Calling to Clarify an RX    Name of Caller: Rox   Pharmacy Name: [facility] [phone]  Prescription Name: butalbital-acetaminophen-caffeine -40 mg (FIORICET, ESGIC) -40 mg per tablet 30 tablet  What do they need to clarify?: diagnoses code and documentation   Best Call Back Number:  Additional Information:

## 2024-06-24 NOTE — TELEPHONE ENCOUNTER
States she's gotten the Fioricet on  5/10 (original rx), 5/29 (1st refill), 6/6 (2nd refill), 6/14 (3rd refill), 6/18 (4th refill)

## 2024-06-25 NOTE — TELEPHONE ENCOUNTER
Should not be taking fioricet this much, if having this many headaches, we need to get her in with neurology to discuss other medication options.

## 2024-06-25 NOTE — TELEPHONE ENCOUNTER
Pt states singh made it on auto refill, so when they call her stating the refill is ready she picks it up. States she is not taking all of them, she has tons of them at home on hand due to the auto refill.

## 2024-07-16 ENCOUNTER — OFFICE VISIT (OUTPATIENT)
Dept: PRIMARY CARE CLINIC | Facility: CLINIC | Age: 59
End: 2024-07-16
Payer: COMMERCIAL

## 2024-07-16 ENCOUNTER — HOSPITAL ENCOUNTER (OUTPATIENT)
Dept: RADIOLOGY | Facility: HOSPITAL | Age: 59
Discharge: HOME | End: 2024-07-16
Attending: PODIATRIST
Payer: COMMERCIAL

## 2024-07-16 VITALS
BODY MASS INDEX: 32.35 KG/M2 | RESPIRATION RATE: 16 BRPM | SYSTOLIC BLOOD PRESSURE: 106 MMHG | HEART RATE: 84 BPM | DIASTOLIC BLOOD PRESSURE: 78 MMHG | OXYGEN SATURATION: 99 % | WEIGHT: 182.56 LBS | HEIGHT: 63 IN

## 2024-07-16 DIAGNOSIS — S96.821A LACERATION OF EXTENSOR TENDON OF RIGHT FOOT, INITIAL ENCOUNTER: ICD-10-CM

## 2024-07-16 DIAGNOSIS — S96.821S: ICD-10-CM

## 2024-07-16 DIAGNOSIS — Z01.818 PRE-OP EXAMINATION: Primary | ICD-10-CM

## 2024-07-16 PROBLEM — T14.8XXA TENDON TEAR: Status: ACTIVE | Noted: 2024-07-16

## 2024-07-16 PROCEDURE — 3078F DIAST BP <80 MM HG: CPT | Mod: CPTII,S$GLB,, | Performed by: NURSE PRACTITIONER

## 2024-07-16 PROCEDURE — 3008F BODY MASS INDEX DOCD: CPT | Mod: CPTII,S$GLB,, | Performed by: NURSE PRACTITIONER

## 2024-07-16 PROCEDURE — 73718 MRI LOWER EXTREMITY W/O DYE: CPT | Mod: TC,LT

## 2024-07-16 PROCEDURE — 1160F RVW MEDS BY RX/DR IN RCRD: CPT | Mod: CPTII,S$GLB,, | Performed by: NURSE PRACTITIONER

## 2024-07-16 PROCEDURE — 73718 MRI LOWER EXTREMITY W/O DYE: CPT | Mod: 26,LT,, | Performed by: RADIOLOGY

## 2024-07-16 PROCEDURE — 99999 PR PBB SHADOW E&M-EST. PATIENT-LVL III: CPT | Mod: PBBFAC,,, | Performed by: NURSE PRACTITIONER

## 2024-07-16 PROCEDURE — 1159F MED LIST DOCD IN RCRD: CPT | Mod: CPTII,S$GLB,, | Performed by: NURSE PRACTITIONER

## 2024-07-16 PROCEDURE — 99214 OFFICE O/P EST MOD 30 MIN: CPT | Mod: S$GLB,,, | Performed by: NURSE PRACTITIONER

## 2024-07-16 PROCEDURE — 3074F SYST BP LT 130 MM HG: CPT | Mod: CPTII,S$GLB,, | Performed by: NURSE PRACTITIONER

## 2024-07-16 NOTE — PROGRESS NOTES
[facility] Note    Chief Complaint      Chief Complaint   Patient presents with    Pre-op Exam     History of Present Illness      Judy Foley is a 58 y.o. female patient of Dr. Dozier with chronic conditions of migraines, anxiety, obesity, GERD,  who presents today for medical clearance for tendon repair with Dr. Gomez on 8/2/24. Pt otherwise feeling well, no other complaints, denies sob or cp. Reviewed meds and history with pt.      Labs order  Allergies-NKDA  DEMETRA-none  Adv reactions to anesthesia      Health Maintenance   Topic Date Due    Mammogram  11/22/2022    Colorectal Cancer Screening  12/18/2027    Lipid Panel  04/14/2028    TETANUS VACCINE  12/19/2028    Hepatitis C Screening  Completed    Shingles Vaccine  Completed       Past Medical History:   Diagnosis Date    GERD (gastroesophageal reflux disease)        Past Surgical History:   Procedure Laterality Date    BUNIONECTOMY      CHOLECYSTECTOMY      colonoscopy  12/18/2017    Normal    EXCISION OF GANGLION CYST OF HAND Left 09/22/2020    Procedure: EXCISION, GANGLION CYST, HAND;  Surgeon: Ulysses Turner Jr., MD;  Location: [facility] OR;  Service: Orthopedics;  Laterality: Left;    HERNIA REPAIR      HYSTERECTOMY      ROTATOR CUFF REPAIR Left     TONSILLECTOMY  1970    TRIGGER FINGER RELEASE Left 09/22/2020    Procedure: RELEASE, TRIGGER FINGER;  Surgeon: Ulysses Turner Jr., MD;  Location: [facility] OR;  Service: Orthopedics;  Laterality: Left;       family history includes Arthritis in her mother; Cancer in her mother; Heart disease in her daughter, daughter, and father; Intellectual disability in her daughter; Pulmonary fibrosis in her mother.    Social History     Tobacco Use    Smoking status: Never    Smokeless tobacco: Never   Substance Use Topics    Alcohol use: No    Drug use: Never       Review of Systems   Constitutional:  Negative for chills and fever.   HENT:  Negative for congestion, sinus pain and sore throat.    Eyes:  Negative  for blurred vision.   Respiratory:  Negative for cough, shortness of breath and wheezing.    Cardiovascular:  Negative for chest pain, palpitations and leg swelling.   Gastrointestinal:  Negative for abdominal pain, constipation, diarrhea, nausea and vomiting.   Genitourinary:  Negative for dysuria.   Musculoskeletal:  Positive for joint pain. Negative for myalgias.   Skin:  Negative for rash.   Neurological:  Negative for dizziness, weakness and headaches.   Psychiatric/Behavioral:  Negative for depression. The patient is not nervous/anxious.         Outpatient Encounter Medications as of 7/16/2024   Medication Sig Dispense Refill    butalbital-acetaminophen-caffeine -40 mg (FIORICET, ESGIC) -40 mg per tablet TAKE 1 TABLET BY MOUTH EVERY 6 HOURS AS NEEDED 30 tablet 5    EScitalopram oxalate (LEXAPRO) 20 MG tablet Take 1 tablet (20 mg total) by mouth once daily. 90 tablet 3    famotidine (PEPCID) 40 MG tablet Take 40 mg by mouth every evening.      multivitamin capsule Take 1 capsule by mouth once daily.      pantoprazole (PROTONIX) 40 MG tablet Take 40 mg by mouth every morning.      sumatriptan (IMITREX) 25 MG Tab Take 1 tablet (25 mg total) by mouth every 2 (two) hours as needed. 9 tablet 4    [DISCONTINUED] diclofenac sodium (VOLTAREN) 1 % Gel Apply 2 g topically 4 (four) times daily. (Patient not taking: Reported on 7/16/2024) 1 Tube 2    [DISCONTINUED] estradiol 1.25 gram/actuation topical gel Place 1.25 g onto the skin once daily. (Patient not taking: Reported on 7/16/2024)      [DISCONTINUED] oxyCODONE-acetaminophen (PERCOCET) 5-325 mg per tablet Take 1 tablet by mouth every 8 (eight) hours as needed for Pain. 30 tablet 0    [DISCONTINUED] oxyCODONE-acetaminophen (PERCOCET) 5-325 mg per tablet Take 1 tablet by mouth every 8 (eight) hours as needed for Pain. (Patient not taking: Reported on 7/16/2024) 30 tablet 0    [DISCONTINUED] rimegepant (NURTEC) 75 mg odt Take 1 tablet (75 mg total) by mouth  "daily as needed for Migraine. Place ODT tablet on the tongue; alternatively the ODT tablet may be placed under the tongue (Patient not taking: Reported on 7/16/2024) 9 tablet 1     No facility-administered encounter medications on file as of 7/16/2024.        Review of patient's allergies indicates:  No Known Allergies    Physical Exam      Vital Signs  Pulse: 84  Resp: 16  SpO2: 99 %  BP: 106/78  BP Location: Right arm  Patient Position: Sitting  Height and Weight  Height: 5' 3" (160 cm)  Weight: 82.8 kg (182 lb 8.7 oz)  BSA (Calculated - sq m): 1.92 sq meters  BMI (Calculated): 32.3  Weight in (lb) to have BMI = 25: 140.8    Physical Exam  Vitals and nursing note reviewed.   Constitutional:       General: She is not in acute distress.     Appearance: Normal appearance. She is well-developed. She is not ill-appearing.   HENT:      Head: Normocephalic and atraumatic.      Right Ear: External ear normal.      Left Ear: External ear normal.   Eyes:      Conjunctiva/sclera: Conjunctivae normal.      Pupils: Pupils are equal, round, and reactive to light.   Neck:      Thyroid: No thyromegaly.      Vascular: No JVD.      Trachea: No tracheal deviation.   Cardiovascular:      Rate and Rhythm: Normal rate and regular rhythm.      Heart sounds: Normal heart sounds. No murmur heard.  Pulmonary:      Effort: Pulmonary effort is normal.      Breath sounds: Normal breath sounds.   Chest:      Chest wall: No tenderness.   Abdominal:      Palpations: Abdomen is soft.      Tenderness: There is no abdominal tenderness. There is no right CVA tenderness or guarding.   Musculoskeletal:         General: Normal range of motion.      Cervical back: Normal range of motion and neck supple.      Comments: Soft boot in place to right foot   Lymphadenopathy:      Cervical: No cervical adenopathy.   Skin:     General: Skin is warm and dry.   Neurological:      General: No focal deficit present.      Mental Status: She is alert and oriented to " "person, place, and time.   Psychiatric:         Mood and Affect: Mood normal.         Behavior: Behavior normal.         Thought Content: Thought content normal.         Judgment: Judgment normal.          Laboratory:  CBC:  Lab Results   Component Value Date    WBC 4.71 04/14/2023    RBC 4.03 04/14/2023    HGB 11.6 (L) 04/14/2023    HCT 36.6 (L) 04/14/2023     04/14/2023    MCV 91 04/14/2023    MCH 28.8 04/14/2023    MCHC 31.7 (L) 04/14/2023    MCHC 32.6 03/04/2022    MCHC 31.1 (L) 07/07/2020     CMP:  Lab Results   Component Value Date    GLU 88 04/14/2023    CALCIUM 9.5 04/14/2023    ALBUMIN 4.1 04/14/2023    PROT 7.3 04/14/2023     04/14/2023    K 4.5 04/14/2023    CO2 23 04/14/2023     04/14/2023    BUN 12 04/14/2023    ALKPHOS 72 04/14/2023    ALT 5 (L) 04/14/2023    AST 12 04/14/2023    BILITOT 0.4 04/14/2023    BILITOT 0.2 03/04/2022    BILITOT 0.2 07/07/2020     URINALYSIS:  No results found for: "COLORU", "CLARITYU", "SPECGRAV", "PHUR", "PROTEINUA", "GLUCOSEU", "BILIRUBINCON", "BLOODU", "WBCU", "RBCU", "BACTERIA", "MUCUS", "NITRITE", "LEUKOCYTESUR", "UROBILINOGEN", "HYALINECASTS"   LIPIDS:  Lab Results   Component Value Date    TSH 1.904 03/04/2022    TSH 2.079 08/25/2020    TSH 4.717 (H) 07/07/2020    HDL 74 04/14/2023    HDL 68 03/04/2022    HDL 70 07/07/2020    CHOL 218 (H) 04/14/2023    CHOL 224 (H) 03/04/2022    CHOL 189 07/07/2020    TRIG 76 04/14/2023    TRIG 85 03/04/2022    TRIG 110 07/07/2020    LDLCALC 128.8 04/14/2023    LDLCALC 139.0 03/04/2022    LDLCALC 97.0 07/07/2020    CHOLHDL 33.9 04/14/2023    CHOLHDL 30.4 03/04/2022    CHOLHDL 37.0 07/07/2020    NONHDLCHOL 144 04/14/2023    NONHDLCHOL 156 03/04/2022    NONHDLCHOL 119 07/07/2020    TOTALCHOLEST 2.9 04/14/2023    TOTALCHOLEST 3.3 03/04/2022    TOTALCHOLEST 2.7 07/07/2020     TSH:  Lab Results   Component Value Date    TSH 1.904 03/04/2022    TSH 2.079 08/25/2020    TSH 4.717 (H) 07/07/2020     A1C:  Lab Results "   Component Value Date    HGBA1C 5.1 04/14/2023         Assessment/Plan     Judy Foley is a 58 y.o.female with:    Pre-op examination  -     CBC Auto Differential; Future; Expected date: 07/16/2024  -     Comprehensive Metabolic Panel; Future; Expected date: 07/16/2024  -     PROTIME-INR; Future; Expected date: 07/16/2024  -     APTT; Future; Expected date: 07/16/2024    Laceration of extensor tendon of right foot, sequela  -     CBC Auto Differential; Future; Expected date: 07/16/2024  -     Comprehensive Metabolic Panel; Future; Expected date: 07/16/2024  -     PROTIME-INR; Future; Expected date: 07/16/2024  -     APTT; Future; Expected date: 07/16/2024        Pt medically optimized for low risk procedure with low cardiac risk profile and may proceed with surgery pending unremarkable lab results    Health Maintenance Due   Topic Date Due    Mammogram  11/22/2022    COVID-19 Vaccine (3 - 2023-24 season) 09/01/2023        I spent 39 minutes on the day of this encounter for preparing for, evaluating, treating, and managing this patient.      -Continue current medications and maintain follow up with specialists.  Return to clinic as needed for any concerns   No follow-ups on file.       ERASTO Norwood  [facility]

## 2024-07-17 ENCOUNTER — PATIENT MESSAGE (OUTPATIENT)
Dept: PODIATRY | Facility: CLINIC | Age: 59
End: 2024-07-17
Payer: COMMERCIAL

## 2024-07-18 ENCOUNTER — PATIENT MESSAGE (OUTPATIENT)
Dept: PODIATRY | Facility: CLINIC | Age: 59
End: 2024-07-18
Payer: COMMERCIAL

## 2024-07-18 DIAGNOSIS — S96.821A LACERATION OF EXTENSOR TENDON OF RIGHT FOOT, INITIAL ENCOUNTER: Primary | ICD-10-CM

## 2024-07-19 RX ORDER — OXYCODONE AND ACETAMINOPHEN 5; 325 MG/1; MG/1
1 TABLET ORAL EVERY 12 HOURS PRN
Qty: 30 TABLET | Refills: 0 | Status: SHIPPED | OUTPATIENT
Start: 2024-07-19

## 2024-07-24 ENCOUNTER — PATIENT MESSAGE (OUTPATIENT)
Dept: PODIATRY | Facility: CLINIC | Age: 59
End: 2024-07-24
Payer: COMMERCIAL

## 2024-07-24 ENCOUNTER — TELEPHONE (OUTPATIENT)
Dept: PODIATRY | Facility: CLINIC | Age: 59
End: 2024-07-24
Payer: COMMERCIAL

## 2024-07-24 NOTE — TELEPHONE ENCOUNTER
Left voice message for patient to give our office a call back at [phone]. Dr. Gomez patient need to  short boot question if she received her scooter  Sx 8/2.

## 2024-07-25 ENCOUNTER — PATIENT MESSAGE (OUTPATIENT)
Dept: PODIATRY | Facility: CLINIC | Age: 59
End: 2024-07-25
Payer: COMMERCIAL

## 2024-07-29 ENCOUNTER — LAB VISIT (OUTPATIENT)
Dept: LAB | Facility: HOSPITAL | Age: 59
End: 2024-07-29
Attending: NURSE PRACTITIONER
Payer: COMMERCIAL

## 2024-07-29 DIAGNOSIS — Z01.818 PRE-OP EXAMINATION: ICD-10-CM

## 2024-07-29 DIAGNOSIS — S96.821S: ICD-10-CM

## 2024-07-29 LAB
ALBUMIN SERPL BCP-MCNC: 3.9 G/DL (ref 3.5–5.2)
ALP SERPL-CCNC: 99 U/L (ref 55–135)
ALT SERPL W/O P-5'-P-CCNC: 26 U/L (ref 10–44)
ANION GAP SERPL CALC-SCNC: 10 MMOL/L (ref 8–16)
APTT PPP: 26.3 SEC (ref 21–32)
AST SERPL-CCNC: 14 U/L (ref 10–40)
BASOPHILS # BLD AUTO: 0.08 K/UL (ref 0–0.2)
BASOPHILS NFR BLD: 1.4 % (ref 0–1.9)
BILIRUB SERPL-MCNC: 0.2 MG/DL (ref 0.1–1)
BUN SERPL-MCNC: 23 MG/DL (ref 6–20)
CALCIUM SERPL-MCNC: 9.6 MG/DL (ref 8.7–10.5)
CHLORIDE SERPL-SCNC: 107 MMOL/L (ref 95–110)
CO2 SERPL-SCNC: 25 MMOL/L (ref 23–29)
CREAT SERPL-MCNC: 0.8 MG/DL (ref 0.5–1.4)
DIFFERENTIAL METHOD BLD: ABNORMAL
EOSINOPHIL # BLD AUTO: 0.3 K/UL (ref 0–0.5)
EOSINOPHIL NFR BLD: 4.3 % (ref 0–8)
ERYTHROCYTE [DISTWIDTH] IN BLOOD BY AUTOMATED COUNT: 15.7 % (ref 11.5–14.5)
EST. GFR  (NO RACE VARIABLE): >60 ML/MIN/1.73 M^2
GLUCOSE SERPL-MCNC: 79 MG/DL (ref 70–110)
HCT VFR BLD AUTO: 33.5 % (ref 37–48.5)
HGB BLD-MCNC: 10.3 G/DL (ref 12–16)
IMM GRANULOCYTES # BLD AUTO: 0.02 K/UL (ref 0–0.04)
IMM GRANULOCYTES NFR BLD AUTO: 0.3 % (ref 0–0.5)
INR PPP: 1 (ref 0.8–1.2)
LYMPHOCYTES # BLD AUTO: 1.7 K/UL (ref 1–4.8)
LYMPHOCYTES NFR BLD: 30 % (ref 18–48)
MCH RBC QN AUTO: 26.5 PG (ref 27–31)
MCHC RBC AUTO-ENTMCNC: 30.7 G/DL (ref 32–36)
MCV RBC AUTO: 86 FL (ref 82–98)
MONOCYTES # BLD AUTO: 0.5 K/UL (ref 0.3–1)
MONOCYTES NFR BLD: 9.2 % (ref 4–15)
NEUTROPHILS # BLD AUTO: 3.2 K/UL (ref 1.8–7.7)
NEUTROPHILS NFR BLD: 54.8 % (ref 38–73)
NRBC BLD-RTO: 0 /100 WBC
PLATELET # BLD AUTO: 350 K/UL (ref 150–450)
PMV BLD AUTO: 10.2 FL (ref 9.2–12.9)
POTASSIUM SERPL-SCNC: 5.1 MMOL/L (ref 3.5–5.1)
PROT SERPL-MCNC: 7.2 G/DL (ref 6–8.4)
PROTHROMBIN TIME: 10.5 SEC (ref 9–12.5)
RBC # BLD AUTO: 3.89 M/UL (ref 4–5.4)
SODIUM SERPL-SCNC: 142 MMOL/L (ref 136–145)
WBC # BLD AUTO: 5.77 K/UL (ref 3.9–12.7)

## 2024-07-29 PROCEDURE — 36415 COLL VENOUS BLD VENIPUNCTURE: CPT | Performed by: NURSE PRACTITIONER

## 2024-07-29 PROCEDURE — 80053 COMPREHEN METABOLIC PANEL: CPT | Performed by: NURSE PRACTITIONER

## 2024-07-29 PROCEDURE — 85610 PROTHROMBIN TIME: CPT | Performed by: NURSE PRACTITIONER

## 2024-07-29 PROCEDURE — 85025 COMPLETE CBC W/AUTO DIFF WBC: CPT | Performed by: NURSE PRACTITIONER

## 2024-07-29 PROCEDURE — 85730 THROMBOPLASTIN TIME PARTIAL: CPT | Performed by: NURSE PRACTITIONER

## 2024-07-31 ENCOUNTER — TELEPHONE (OUTPATIENT)
Dept: PODIATRY | Facility: CLINIC | Age: 59
End: 2024-07-31
Payer: COMMERCIAL

## 2024-08-01 ENCOUNTER — PATIENT MESSAGE (OUTPATIENT)
Dept: PODIATRY | Facility: CLINIC | Age: 59
End: 2024-08-01
Payer: COMMERCIAL

## 2024-08-01 ENCOUNTER — TELEPHONE (OUTPATIENT)
Dept: PODIATRY | Facility: CLINIC | Age: 59
End: 2024-08-01
Payer: COMMERCIAL

## 2024-08-01 NOTE — TELEPHONE ENCOUNTER
Spoke to pt and relayed their 7 arrival time for surgery. Also informed pt to not eat or drink after midnight including gum, hard candy or mints. Also that the pt must have a ride home from surgery, they will not be allowed to drive after anesthesia. Pt verbalized understanding and call ended. Post/op 8/8 @ 10:45 Dr. Gomez [facility].

## 2024-08-02 ENCOUNTER — ANESTHESIA (OUTPATIENT)
Dept: SURGERY | Facility: HOSPITAL | Age: 59
End: 2024-08-02
Payer: COMMERCIAL

## 2024-08-02 ENCOUNTER — HOSPITAL ENCOUNTER (OUTPATIENT)
Facility: HOSPITAL | Age: 59
Discharge: HOME | End: 2024-08-02
Attending: PODIATRIST | Admitting: PODIATRIST
Payer: COMMERCIAL

## 2024-08-02 ENCOUNTER — ANESTHESIA EVENT (OUTPATIENT)
Dept: SURGERY | Facility: HOSPITAL | Age: 59
End: 2024-08-02
Payer: COMMERCIAL

## 2024-08-02 VITALS
HEART RATE: 82 BPM | TEMPERATURE: 98 F | WEIGHT: 160 LBS | SYSTOLIC BLOOD PRESSURE: 107 MMHG | RESPIRATION RATE: 22 BRPM | DIASTOLIC BLOOD PRESSURE: 68 MMHG | HEIGHT: 63 IN | BODY MASS INDEX: 28.35 KG/M2 | OXYGEN SATURATION: 95 %

## 2024-08-02 DIAGNOSIS — S96.129A: ICD-10-CM

## 2024-08-02 DIAGNOSIS — S96.821A LACERATION OF EXTENSOR TENDON OF RIGHT FOOT, INITIAL ENCOUNTER: ICD-10-CM

## 2024-08-02 DIAGNOSIS — T14.8XXA TENDON TEAR: Primary | ICD-10-CM

## 2024-08-02 PROCEDURE — 37000008 HC ANESTHESIA 1ST 15 MINUTES: Performed by: PODIATRIST

## 2024-08-02 PROCEDURE — 94761 N-INVAS EAR/PLS OXIMETRY MLT: CPT

## 2024-08-02 PROCEDURE — 71000033 HC RECOVERY, INTIAL HOUR: Performed by: PODIATRIST

## 2024-08-02 PROCEDURE — 63600175 PHARM REV CODE 636 W HCPCS: Mod: JZ,JG | Performed by: PODIATRIST

## 2024-08-02 PROCEDURE — 99900035 HC TECH TIME PER 15 MIN (STAT)

## 2024-08-02 PROCEDURE — 36000706: Performed by: PODIATRIST

## 2024-08-02 PROCEDURE — 63600175 PHARM REV CODE 636 W HCPCS: Performed by: NURSE ANESTHETIST, CERTIFIED REGISTERED

## 2024-08-02 PROCEDURE — 25000003 PHARM REV CODE 250: Performed by: NURSE ANESTHETIST, CERTIFIED REGISTERED

## 2024-08-02 PROCEDURE — 37000009 HC ANESTHESIA EA ADD 15 MINS: Performed by: PODIATRIST

## 2024-08-02 PROCEDURE — 71000015 HC POSTOP RECOV 1ST HR: Performed by: PODIATRIST

## 2024-08-02 PROCEDURE — 28208 REPAIR OF FOOT TENDON: CPT | Mod: RT,,, | Performed by: PODIATRIST

## 2024-08-02 PROCEDURE — 36000707: Performed by: PODIATRIST

## 2024-08-02 PROCEDURE — 63600175 PHARM REV CODE 636 W HCPCS: Performed by: ANESTHESIOLOGY

## 2024-08-02 PROCEDURE — 27201423 OPTIME MED/SURG SUP & DEVICES STERILE SUPPLY: Performed by: PODIATRIST

## 2024-08-02 DEVICE — SUTURE
Type: IMPLANTABLE DEVICE | Site: FOOT | Status: FUNCTIONAL
Brand: XBRAID S

## 2024-08-02 RX ORDER — BUPIVACAINE HYDROCHLORIDE 2.5 MG/ML
INJECTION, SOLUTION EPIDURAL; INFILTRATION; INTRACAUDAL
Status: DISCONTINUED | OUTPATIENT
Start: 2024-08-02 | End: 2024-08-02 | Stop reason: HOSPADM

## 2024-08-02 RX ORDER — FENTANYL CITRATE 50 UG/ML
25 INJECTION, SOLUTION INTRAMUSCULAR; INTRAVENOUS EVERY 5 MIN PRN
Status: DISCONTINUED | OUTPATIENT
Start: 2024-08-02 | End: 2024-08-02 | Stop reason: HOSPADM

## 2024-08-02 RX ORDER — ROPIVACAINE HYDROCHLORIDE 5 MG/ML
INJECTION, SOLUTION EPIDURAL; INFILTRATION; PERINEURAL
Status: COMPLETED | OUTPATIENT
Start: 2024-08-02 | End: 2024-08-02

## 2024-08-02 RX ORDER — OXYCODONE AND ACETAMINOPHEN 5; 325 MG/1; MG/1
1 TABLET ORAL
Status: DISCONTINUED | OUTPATIENT
Start: 2024-08-02 | End: 2024-08-02 | Stop reason: HOSPADM

## 2024-08-02 RX ORDER — KETAMINE HCL IN 0.9 % NACL 50 MG/5 ML
SYRINGE (ML) INTRAVENOUS
Status: DISCONTINUED | OUTPATIENT
Start: 2024-08-02 | End: 2024-08-02

## 2024-08-02 RX ORDER — ONDANSETRON HYDROCHLORIDE 2 MG/ML
4 INJECTION, SOLUTION INTRAVENOUS DAILY PRN
Status: DISCONTINUED | OUTPATIENT
Start: 2024-08-02 | End: 2024-08-02 | Stop reason: HOSPADM

## 2024-08-02 RX ORDER — ACETAMINOPHEN 10 MG/ML
INJECTION, SOLUTION INTRAVENOUS
Status: DISCONTINUED | OUTPATIENT
Start: 2024-08-02 | End: 2024-08-02

## 2024-08-02 RX ORDER — MIDAZOLAM HYDROCHLORIDE 1 MG/ML
INJECTION, SOLUTION INTRAMUSCULAR; INTRAVENOUS
Status: DISCONTINUED | OUTPATIENT
Start: 2024-08-02 | End: 2024-08-02

## 2024-08-02 RX ORDER — PHENYLEPHRINE HYDROCHLORIDE 10 MG/ML
INJECTION INTRAVENOUS
Status: DISCONTINUED | OUTPATIENT
Start: 2024-08-02 | End: 2024-08-02

## 2024-08-02 RX ORDER — PROPOFOL 10 MG/ML
VIAL (ML) INTRAVENOUS CONTINUOUS PRN
Status: DISCONTINUED | OUTPATIENT
Start: 2024-08-02 | End: 2024-08-02

## 2024-08-02 RX ORDER — PROCHLORPERAZINE EDISYLATE 5 MG/ML
5 INJECTION INTRAMUSCULAR; INTRAVENOUS EVERY 30 MIN PRN
Status: DISCONTINUED | OUTPATIENT
Start: 2024-08-02 | End: 2024-08-02 | Stop reason: HOSPADM

## 2024-08-02 RX ORDER — OXYCODONE AND ACETAMINOPHEN 7.5; 325 MG/1; MG/1
1 TABLET ORAL EVERY 6 HOURS PRN
Qty: 28 TABLET | Refills: 0 | Status: SHIPPED | OUTPATIENT
Start: 2024-08-02 | End: 2024-08-08 | Stop reason: SDUPTHER

## 2024-08-02 RX ORDER — HYDROMORPHONE HYDROCHLORIDE 1 MG/ML
0.2 INJECTION, SOLUTION INTRAMUSCULAR; INTRAVENOUS; SUBCUTANEOUS EVERY 5 MIN PRN
Status: DISCONTINUED | OUTPATIENT
Start: 2024-08-02 | End: 2024-08-02 | Stop reason: HOSPADM

## 2024-08-02 RX ORDER — DEXMEDETOMIDINE HYDROCHLORIDE 100 UG/ML
INJECTION, SOLUTION INTRAVENOUS
Status: DISCONTINUED | OUTPATIENT
Start: 2024-08-02 | End: 2024-08-02

## 2024-08-02 RX ORDER — FENTANYL CITRATE 50 UG/ML
INJECTION, SOLUTION INTRAMUSCULAR; INTRAVENOUS
Status: DISCONTINUED | OUTPATIENT
Start: 2024-08-02 | End: 2024-08-02

## 2024-08-02 RX ORDER — KETOROLAC TROMETHAMINE 30 MG/ML
INJECTION, SOLUTION INTRAMUSCULAR; INTRAVENOUS
Status: DISCONTINUED | OUTPATIENT
Start: 2024-08-02 | End: 2024-08-02

## 2024-08-02 RX ORDER — GLUCAGON 1 MG
1 KIT INJECTION
Status: DISCONTINUED | OUTPATIENT
Start: 2024-08-02 | End: 2024-08-02 | Stop reason: HOSPADM

## 2024-08-02 RX ORDER — CEFAZOLIN SODIUM 1 G/3ML
INJECTION, POWDER, FOR SOLUTION INTRAMUSCULAR; INTRAVENOUS
Status: DISCONTINUED | OUTPATIENT
Start: 2024-08-02 | End: 2024-08-02

## 2024-08-02 RX ORDER — LIDOCAINE HYDROCHLORIDE 20 MG/ML
INJECTION INTRAVENOUS
Status: DISCONTINUED | OUTPATIENT
Start: 2024-08-02 | End: 2024-08-02

## 2024-08-02 RX ORDER — SODIUM CHLORIDE 0.9 % (FLUSH) 0.9 %
10 SYRINGE (ML) INJECTION
Status: DISCONTINUED | OUTPATIENT
Start: 2024-08-02 | End: 2024-08-02 | Stop reason: HOSPADM

## 2024-08-02 RX ADMIN — Medication 30 MG: at 09:08

## 2024-08-02 RX ADMIN — PHENYLEPHRINE HYDROCHLORIDE 150 MCG: 10 INJECTION INTRAVENOUS at 09:08

## 2024-08-02 RX ADMIN — ROPIVACAINE HYDROCHLORIDE 15 ML: 5 INJECTION, SOLUTION EPIDURAL; INFILTRATION; PERINEURAL at 08:08

## 2024-08-02 RX ADMIN — KETOROLAC TROMETHAMINE 30 MG: 30 INJECTION, SOLUTION INTRAMUSCULAR; INTRAVENOUS at 09:08

## 2024-08-02 RX ADMIN — ACETAMINOPHEN 1000 MG: 10 INJECTION INTRAVENOUS at 09:08

## 2024-08-02 RX ADMIN — ROPIVACAINE HYDROCHLORIDE 15 ML: 5 INJECTION EPIDURAL; INFILTRATION; PERINEURAL at 08:08

## 2024-08-02 RX ADMIN — CEFAZOLIN 2 G: 330 INJECTION, POWDER, FOR SOLUTION INTRAMUSCULAR; INTRAVENOUS at 09:08

## 2024-08-02 RX ADMIN — FENTANYL CITRATE 25 MCG: 50 INJECTION, SOLUTION INTRAMUSCULAR; INTRAVENOUS at 09:08

## 2024-08-02 RX ADMIN — SODIUM CHLORIDE, SODIUM GLUCONATE, SODIUM ACETATE, POTASSIUM CHLORIDE, MAGNESIUM CHLORIDE, SODIUM PHOSPHATE, DIBASIC, AND POTASSIUM PHOSPHATE: .53; .5; .37; .037; .03; .012; .00082 INJECTION, SOLUTION INTRAVENOUS at 09:08

## 2024-08-02 RX ADMIN — PROPOFOL 150 MCG/KG/MIN: 10 INJECTION, EMULSION INTRAVENOUS at 08:08

## 2024-08-02 RX ADMIN — LIDOCAINE HYDROCHLORIDE 20 MG: 20 INJECTION INTRAVENOUS at 08:08

## 2024-08-02 RX ADMIN — DEXMEDETOMIDINE HYDROCHLORIDE 8 MCG: 100 INJECTION, SOLUTION INTRAVENOUS at 09:08

## 2024-08-02 RX ADMIN — MIDAZOLAM HYDROCHLORIDE 2 MG: 1 INJECTION, SOLUTION INTRAMUSCULAR; INTRAVENOUS at 08:08

## 2024-08-02 RX ADMIN — SODIUM CHLORIDE: 0.9 INJECTION, SOLUTION INTRAVENOUS at 08:08

## 2024-08-02 RX ADMIN — FENTANYL CITRATE 50 MCG: 50 INJECTION, SOLUTION INTRAMUSCULAR; INTRAVENOUS at 08:08

## 2024-08-02 RX ADMIN — Medication 20 MG: at 09:08

## 2024-08-02 RX ADMIN — GLYCOPYRROLATE 0.1 MCG: 0.2 INJECTION, SOLUTION INTRAMUSCULAR; INTRAVENOUS at 08:08

## 2024-08-02 NOTE — ANESTHESIA PROCEDURE NOTES
Peripheral Block    Patient location during procedure: pre-op   Block not for primary anesthetic.  Reason for block: at surgeon's request and post-op pain management   Post-op Pain Location: right ankle   Start time: 8/2/2024 8:50 AM  Timeout: 8/2/2024 8:45 AM   End time: 8/2/2024 8:57 AM    Staffing  Authorizing Provider: Luis F Hernandez MD  Performing Provider: Luis F Hernandez MD    Staffing  Performed by: Luis F Hernandez MD  Authorized by: Luis F Hernandez MD    Preanesthetic Checklist  Completed: patient identified, IV checked, site marked, risks and benefits discussed, surgical consent, monitors and equipment checked, pre-op evaluation and timeout performed  Peripheral Block  Patient position: supine  Prep: ChloraPrep  Patient monitoring: heart rate, cardiac monitor, continuous pulse ox, continuous capnometry and frequent blood pressure checks  Block type: popliteal  Laterality: right  Injection technique: single shot  Needle  Needle type: Stimuplex   Needle gauge: 20 G  Needle length: 4 in  Needle localization: anatomical landmarks and ultrasound guidance   -ultrasound image captured on disc.  Assessment  Injection assessment: negative aspiration, negative parasthesia and local visualized surrounding nerve  Paresthesia pain: none  Heart rate change: no  Slow fractionated injection: yes    Medications:    Medications: ropivacaine (NAROPIN) injection 0.5% - Perineural   15 mL - 8/2/2024 8:54:00 AM    Additional Notes  VSS.  [facility] RN monitoring vitals throughout procedure.  Patient tolerated procedure well.

## 2024-08-02 NOTE — TRANSFER OF CARE
"Anesthesia Transfer of Care Note    Patient: Judy Foley    Procedure(s) Performed: Procedure(s) (LRB):  REPAIR, TENDON, EXTENSOR (Right)    Patient location: PACU    Anesthesia Type: general    Transport from OR: Transported from OR on 6-10 L/min O2 by face mask with adequate spontaneous ventilation    Post pain: adequate analgesia    Post assessment: no apparent anesthetic complications    Post vital signs: stable    Level of consciousness: sedated    Nausea/Vomiting: no nausea/vomiting    Complications: none    Transfer of care protocol was followed      Last vitals: Visit Vitals  /79 (BP Location: Right arm, Patient Position: Lying)   Pulse 84   Resp 18   Ht 5' 3" (1.6 m)   Wt 72.6 kg (160 lb)   SpO2 100%   BMI 28.34 kg/m²     "

## 2024-08-02 NOTE — ANESTHESIA POSTPROCEDURE EVALUATION
Anesthesia Post Evaluation    Patient: Judy Foley    Procedure(s) Performed: Procedure(s) (LRB):  REPAIR, TENDON, EXTENSOR (Right)    Final Anesthesia Type: general      Patient location during evaluation: PACU  Patient participation: Yes- Able to Participate  Level of consciousness: awake and alert  Post-procedure vital signs: reviewed and stable  Pain management: adequate  Airway patency: patent    PONV status at discharge: No PONV  Anesthetic complications: no      Cardiovascular status: stable  Respiratory status: spontaneous ventilation  Hydration status: euvolemic  Follow-up not needed.          Vitals Value Taken Time   /69 08/02/24 1046   Temp 36.6 °C (97.9 °F) 08/02/24 1011   Pulse 84 08/02/24 1054   Resp 12 08/02/24 1054   SpO2 96 % 08/02/24 1054   Vitals shown include unfiled device data.      Event Time   Out of Recovery 10:30:00         Pain/Paola Score: Apola Score: 9 (8/2/2024 10:30 AM)

## 2024-08-02 NOTE — ANESTHESIA PREPROCEDURE EVALUATION
08/02/2024  Judy Foley is a 58 y.o., female.      Pre-op Assessment    I have reviewed the Patient Summary Reports.     I have reviewed the Nursing Notes. I have reviewed the NPO Status.   I have reviewed the Medications.     Review of Systems  Anesthesia Hx:             Denies Family Hx of Anesthesia complications.    Denies Personal Hx of Anesthesia complications.                    Hepatic/GI:     GERD               Physical Exam  General: Well nourished    Airway:  Mallampati: II   Mouth Opening: Normal  TM Distance: Normal    Chest/Lungs:  Normal Respiratory Rate, Clear to auscultation    Heart:  Rate: Normal  Rhythm: Regular Rhythm    Anesthesia Plan  Type of Anesthesia, risks & benefits discussed:    Anesthesia Type: Gen Natural Airway, Gen Supraglottic Airway, Regional  Intra-op Monitoring Plan: Standard ASA Monitors  Post Op Pain Control Plan: multimodal analgesia  Induction:  IV  Informed Consent: Informed consent signed with the Patient and all parties understand the risks and agree with anesthesia plan.  All questions answered.   ASA Score: 2  Day of Surgery Review of History & Physical: H&P completed by Anesthesiologist.    Ready For Surgery From Anesthesia Perspective.   .

## 2024-08-02 NOTE — ANESTHESIA PROCEDURE NOTES
Peripheral Block    Patient location during procedure: pre-op   Block not for primary anesthetic.  Reason for block: at surgeon's request and post-op pain management   Post-op Pain Location: right ankle   Start time: 8/2/2024 8:50 AM  Timeout: 8/2/2024 8:45 AM   End time: 8/2/2024 8:57 AM    Staffing  Authorizing Provider: Luis F Hernandez MD  Performing Provider: Luis F Hernandez MD    Staffing  Performed by: Luis F Hernandez MD  Authorized by: Luis F Hernandez MD    Preanesthetic Checklist  Completed: patient identified, IV checked, site marked, risks and benefits discussed, surgical consent, monitors and equipment checked, pre-op evaluation and timeout performed  Peripheral Block  Patient position: supine  Prep: ChloraPrep  Patient monitoring: heart rate, cardiac monitor, continuous pulse ox, continuous capnometry and frequent blood pressure checks  Block type: adductor canal  Laterality: right  Injection technique: single shot  Needle  Needle type: Stimuplex   Needle gauge: 20 G  Needle length: 4 in  Needle localization: anatomical landmarks and ultrasound guidance   -ultrasound image captured on disc.  Assessment  Injection assessment: negative aspiration, negative parasthesia and local visualized surrounding nerve  Paresthesia pain: none  Heart rate change: no  Slow fractionated injection: yes    Medications:    Medications: ropivacaine (NAROPIN) injection 0.5% - Perineural   15 mL - 8/2/2024 8:56:00 AM    Additional Notes  VSS.  [facility] RN monitoring vitals throughout procedure.  Patient tolerated procedure well.

## 2024-08-08 ENCOUNTER — OFFICE VISIT (OUTPATIENT)
Dept: PODIATRY | Facility: CLINIC | Age: 59
End: 2024-08-08
Payer: COMMERCIAL

## 2024-08-08 VITALS
HEIGHT: 63 IN | BODY MASS INDEX: 28.34 KG/M2 | DIASTOLIC BLOOD PRESSURE: 71 MMHG | HEART RATE: 84 BPM | SYSTOLIC BLOOD PRESSURE: 100 MMHG

## 2024-08-08 DIAGNOSIS — S96.821A LACERATION OF EXTENSOR TENDON OF RIGHT FOOT, INITIAL ENCOUNTER: Primary | ICD-10-CM

## 2024-08-08 PROCEDURE — 99999 PR PBB SHADOW E&M-EST. PATIENT-LVL III: CPT | Mod: PBBFAC,,, | Performed by: PODIATRIST

## 2024-08-08 PROCEDURE — 99024 POSTOP FOLLOW-UP VISIT: CPT | Mod: S$GLB,,, | Performed by: PODIATRIST

## 2024-08-08 PROCEDURE — 3074F SYST BP LT 130 MM HG: CPT | Mod: CPTII,S$GLB,, | Performed by: PODIATRIST

## 2024-08-08 PROCEDURE — 3078F DIAST BP <80 MM HG: CPT | Mod: CPTII,S$GLB,, | Performed by: PODIATRIST

## 2024-08-08 RX ORDER — GABAPENTIN 100 MG/1
100 CAPSULE ORAL 3 TIMES DAILY
Qty: 90 CAPSULE | Refills: 1 | Status: SHIPPED | OUTPATIENT
Start: 2024-08-08 | End: 2025-08-08

## 2024-08-08 RX ORDER — OXYCODONE AND ACETAMINOPHEN 7.5; 325 MG/1; MG/1
1 TABLET ORAL EVERY 6 HOURS PRN
Qty: 28 TABLET | Refills: 0 | Status: SHIPPED | OUTPATIENT
Start: 2024-08-08 | End: 2024-08-15 | Stop reason: SDUPTHER

## 2024-08-09 ENCOUNTER — PATIENT MESSAGE (OUTPATIENT)
Dept: PODIATRY | Facility: CLINIC | Age: 59
End: 2024-08-09
Payer: COMMERCIAL

## 2024-08-11 RX ORDER — SULFAMETHOXAZOLE AND TRIMETHOPRIM 400; 80 MG/1; MG/1
1 TABLET ORAL 2 TIMES DAILY
Qty: 20 TABLET | Refills: 0 | Status: SHIPPED | OUTPATIENT
Start: 2024-08-11

## 2024-08-11 NOTE — PROGRESS NOTES
Subjective:      Patient ID: Judy Foley is a 58 y.o. female.    Chief Complaint:   Post-op Evaluation (Post-op #1 /Right foot)    Judy is a 58 y.o. female who presents to the podiatry clinic  with complaint of  right foot pain. Onset of the symptoms was several days ago. Precipitating event: injured with a kitchen knife . Current symptoms include: inability to bear weight. Aggravating factors: any weight bearing. Symptoms have gradually worsened. Patient has had no prior foot problems. Evaluation to date: none. Treatment to date: avoidance of offending activity. Patients rates pain 8/10 on pain scale.    8/8:  Patient presents to clinic for post-op visit #1 for R foot EHL tendon repair.  Patient reports mild pain to her R foot that has been manageable with meds.  Dressings have been clean, dry, and intact.  Has been compliant with wearing post-op shoe at all times.  Denies fevers, chills, nausea, or vomiting.  Denies SOB or chest pain denies any new pedal complaints.    Review of Systems   Constitutional: Negative for chills, decreased appetite, fever and malaise/fatigue.   HENT:  Negative for congestion, hearing loss, nosebleeds and tinnitus.    Eyes:  Negative for double vision, pain, photophobia and visual disturbance.   Cardiovascular:  Negative for chest pain, claudication, cyanosis and leg swelling.   Respiratory:  Negative for cough, hemoptysis, shortness of breath and wheezing.    Endocrine: Negative for cold intolerance and heat intolerance.   Hematologic/Lymphatic: Negative for adenopathy and bleeding problem.   Skin:  Negative for color change, dry skin, itching, nail changes and suspicious lesions.   Musculoskeletal:  Positive for joint pain, joint swelling and stiffness. Negative for arthritis.   Gastrointestinal:  Negative for abdominal pain, jaundice, nausea and vomiting.   Genitourinary:  Negative for dysuria, frequency and hematuria.   Neurological:  Negative for difficulty with concentration, loss  of balance, numbness, paresthesias and sensory change.   Psychiatric/Behavioral:  Negative for altered mental status, hallucinations and suicidal ideas. The patient is not nervous/anxious.    Allergic/Immunologic: Negative for environmental allergies and persistent infections.         Objective:      Physical Exam  Vitals reviewed.   Constitutional:       Appearance: She is well-developed.   HENT:      Head: Normocephalic and atraumatic.   Cardiovascular:      Pulses:           Dorsalis pedis pulses are 2+ on the right side and 2+ on the left side.        Posterior tibial pulses are 2+ on the right side and 2+ on the left side.   Pulmonary:      Effort: Pulmonary effort is normal.   Musculoskeletal:         General: Normal range of motion.      Comments: R EHL 3/4 muscle strength.  All other muscle groups normal muscle strength bilateral.    The R hallux was noted to be slightly plantarflexed   Skin:     General: Skin is warm and dry.      Capillary Refill: Capillary refill takes 2 to 3 seconds.      Comments: Skin turgor is normal bilaterally.  Skin texture is well hydrated to both lower extremities.  No lesions or rashes or wounds appreciated bilaterally.  Nail plates 1 through 5 bilaterally are within normal limits for length and thickness.  No nail clubbing or incurvation noted.    Sutures intact without dehiscence.  No erythema.  Swelling noted.   Neurological:      Mental Status: She is alert and oriented to person, place, and time.      Comments: Sharp dull light touch vibratory proprioceptive sensation are intact bilaterally.  Deep tendon reflexes to patellar and Achilles tendon are symmetrical 2 over 4 bilaterally.  No ankle clonus or Babinski reflexes noted bilaterally.  Coordination is normal to both feet and lower extremities.    No tenderness to palpation   Psychiatric:         Behavior: Behavior normal.             Assessment:       Encounter Diagnosis   Name Primary?    Laceration of extensor tendon  of right foot, initial encounter Yes     Independent visualization of imaging was performed.  Results were reviewed in detail with patient.       Plan:       Judy was seen today for post-op evaluation.    Diagnoses and all orders for this visit:    Laceration of extensor tendon of right foot, initial encounter  -     oxyCODONE-acetaminophen (PERCOCET) 7.5-325 mg per tablet; Take 1 tablet by mouth every 6 (six) hours as needed for Pain.    Other orders  -     gabapentin (NEURONTIN) 100 MG capsule; Take 1 capsule (100 mg total) by mouth 3 (three) times daily.      -I counseled the patient on her conditions, their implications and medical management.  -exam findings discussed with the patient.  Discussed with the patient's hallux slightly plantar flexed.  -R hallux splinted in rectus/slightly dorsiflexed angle to allow for appropriate scarring EHL tendon  -R foot dressed with Xeroform, gauze, florencio and ACE wrap  -WBAT in post-op shoe at all time  -Rx Percocet and gabapentin p.r.n. for pain  -RT see 1 week for R foot post-op follow up    Janny Hearn DPM   Podiatric Medicine & Surgery  [facility]

## 2024-08-14 ENCOUNTER — OFFICE VISIT (OUTPATIENT)
Dept: PODIATRY | Facility: CLINIC | Age: 59
End: 2024-08-14
Payer: COMMERCIAL

## 2024-08-14 VITALS
DIASTOLIC BLOOD PRESSURE: 65 MMHG | SYSTOLIC BLOOD PRESSURE: 95 MMHG | HEIGHT: 63 IN | HEART RATE: 92 BPM | BODY MASS INDEX: 28.34 KG/M2

## 2024-08-14 DIAGNOSIS — S96.821A LACERATION OF EXTENSOR TENDON OF RIGHT FOOT, INITIAL ENCOUNTER: Primary | ICD-10-CM

## 2024-08-14 PROCEDURE — 1159F MED LIST DOCD IN RCRD: CPT | Mod: CPTII,S$GLB,, | Performed by: PODIATRIST

## 2024-08-14 PROCEDURE — 99024 POSTOP FOLLOW-UP VISIT: CPT | Mod: S$GLB,,, | Performed by: PODIATRIST

## 2024-08-14 PROCEDURE — 3078F DIAST BP <80 MM HG: CPT | Mod: CPTII,S$GLB,, | Performed by: PODIATRIST

## 2024-08-14 PROCEDURE — 3074F SYST BP LT 130 MM HG: CPT | Mod: CPTII,S$GLB,, | Performed by: PODIATRIST

## 2024-08-14 PROCEDURE — 99999 PR PBB SHADOW E&M-EST. PATIENT-LVL III: CPT | Mod: PBBFAC,,, | Performed by: PODIATRIST

## 2024-08-15 DIAGNOSIS — S96.821A LACERATION OF EXTENSOR TENDON OF RIGHT FOOT, INITIAL ENCOUNTER: ICD-10-CM

## 2024-08-15 RX ORDER — OXYCODONE AND ACETAMINOPHEN 7.5; 325 MG/1; MG/1
1 TABLET ORAL EVERY 6 HOURS PRN
Qty: 28 TABLET | Refills: 0 | Status: SHIPPED | OUTPATIENT
Start: 2024-08-15

## 2024-08-16 ENCOUNTER — PATIENT MESSAGE (OUTPATIENT)
Dept: PODIATRY | Facility: CLINIC | Age: 59
End: 2024-08-16
Payer: COMMERCIAL

## 2024-08-18 NOTE — PROGRESS NOTES
Subjective:      Patient ID: Judy Foley is a 58 y.o. female.    Chief Complaint:   Post-op Evaluation (#2 right foot )    Judy is a 58 y.o. female who presents to the podiatry clinic  with complaint of  right foot pain. Onset of the symptoms was several days ago. Precipitating event: injured with a kitchen knife . Current symptoms include: inability to bear weight. Aggravating factors: any weight bearing. Symptoms have gradually worsened. Patient has had no prior foot problems. Evaluation to date: none. Treatment to date: avoidance of offending activity. Patients rates pain 8/10 on pain scale.    8/8:  Patient presents to clinic for post-op visit #2 for R foot EHL tendon repair.  Patient reports mild pain to her R foot that has been manageable with meds.  Dressings have been clean, dry, and intact.  Has been compliant with wearing post-op shoe at all times.  Denies fevers, chills, nausea, or vomiting.  Denies SOB or chest pain denies any new pedal complaints.    Review of Systems   Constitutional: Negative for chills, decreased appetite, fever and malaise/fatigue.   HENT:  Negative for congestion, hearing loss, nosebleeds and tinnitus.    Eyes:  Negative for double vision, pain, photophobia and visual disturbance.   Cardiovascular:  Negative for chest pain, claudication, cyanosis and leg swelling.   Respiratory:  Negative for cough, hemoptysis, shortness of breath and wheezing.    Endocrine: Negative for cold intolerance and heat intolerance.   Hematologic/Lymphatic: Negative for adenopathy and bleeding problem.   Skin:  Negative for color change, dry skin, itching, nail changes and suspicious lesions.   Musculoskeletal:  Positive for joint pain, joint swelling and stiffness. Negative for arthritis.   Gastrointestinal:  Negative for abdominal pain, jaundice, nausea and vomiting.   Genitourinary:  Negative for dysuria, frequency and hematuria.   Neurological:  Negative for difficulty with concentration, loss of  balance, numbness, paresthesias and sensory change.   Psychiatric/Behavioral:  Negative for altered mental status, hallucinations and suicidal ideas. The patient is not nervous/anxious.    Allergic/Immunologic: Negative for environmental allergies and persistent infections.         Objective:      Physical Exam  Vitals reviewed.   Constitutional:       Appearance: She is well-developed.   HENT:      Head: Normocephalic and atraumatic.   Cardiovascular:      Pulses:           Dorsalis pedis pulses are 2+ on the right side and 2+ on the left side.        Posterior tibial pulses are 2+ on the right side and 2+ on the left side.   Pulmonary:      Effort: Pulmonary effort is normal.   Musculoskeletal:         General: Normal range of motion.      Comments: R EHL 3/4 muscle strength.  All other muscle groups normal muscle strength bilateral.    The R hallux was noted to be slightly plantarflexed   Skin:     General: Skin is warm and dry.      Capillary Refill: Capillary refill takes 2 to 3 seconds.      Comments: Skin turgor is normal bilaterally.  Skin texture is well hydrated to both lower extremities.  No lesions or rashes or wounds appreciated bilaterally.  Nail plates 1 through 5 bilaterally are within normal limits for length and thickness.  No nail clubbing or incurvation noted.    Sutures intact without dehiscence.  No erythema.  Swelling noted.   Neurological:      Mental Status: She is alert and oriented to person, place, and time.      Comments: Sharp dull light touch vibratory proprioceptive sensation are intact bilaterally.  Deep tendon reflexes to patellar and Achilles tendon are symmetrical 2 over 4 bilaterally.  No ankle clonus or Babinski reflexes noted bilaterally.  Coordination is normal to both feet and lower extremities.    No tenderness to palpation   Psychiatric:         Behavior: Behavior normal.               Assessment:       Encounter Diagnosis   Name Primary?    Laceration of extensor tendon of  right foot, initial encounter Yes     Independent visualization of imaging was performed.  Results were reviewed in detail with patient.       Plan:       Judy was seen today for post-op evaluation.    Diagnoses and all orders for this visit:    Laceration of extensor tendon of right foot, initial encounter      -I counseled the patient on her conditions, their implications and medical management.  -exam findings discussed with the patient.  Discussed with the patient's hallux slightly plantar flexed.  -R hallux splinted in rectus/slightly dorsiflexed angle to allow for appropriate scarring EHL tendon  -R foot dressed with Xeroform, gauze, florencio and ACE wrap  -WBAT in post-op shoe at all time  -Rx Percocet and gabapentin p.r.n. for pain  -RT see 1 week for R foot post-op follow up    Janny Hearn DPM   Podiatric Medicine & Surgery  [facility]

## 2024-08-22 ENCOUNTER — OFFICE VISIT (OUTPATIENT)
Dept: PODIATRY | Facility: CLINIC | Age: 59
End: 2024-08-22
Payer: COMMERCIAL

## 2024-08-22 VITALS
SYSTOLIC BLOOD PRESSURE: 95 MMHG | HEIGHT: 63 IN | HEART RATE: 92 BPM | DIASTOLIC BLOOD PRESSURE: 65 MMHG | BODY MASS INDEX: 28.34 KG/M2

## 2024-08-22 DIAGNOSIS — S96.821A LACERATION OF EXTENSOR TENDON OF RIGHT FOOT, INITIAL ENCOUNTER: Primary | ICD-10-CM

## 2024-08-22 PROCEDURE — 99999 PR PBB SHADOW E&M-EST. PATIENT-LVL III: CPT | Mod: PBBFAC,,, | Performed by: PODIATRIST

## 2024-08-22 PROCEDURE — 3078F DIAST BP <80 MM HG: CPT | Mod: CPTII,S$GLB,, | Performed by: PODIATRIST

## 2024-08-22 PROCEDURE — 3074F SYST BP LT 130 MM HG: CPT | Mod: CPTII,S$GLB,, | Performed by: PODIATRIST

## 2024-08-22 PROCEDURE — 99024 POSTOP FOLLOW-UP VISIT: CPT | Mod: S$GLB,,, | Performed by: PODIATRIST

## 2024-08-23 ENCOUNTER — PATIENT MESSAGE (OUTPATIENT)
Dept: PODIATRY | Facility: CLINIC | Age: 59
End: 2024-08-23
Payer: COMMERCIAL

## 2024-08-26 ENCOUNTER — PATIENT MESSAGE (OUTPATIENT)
Dept: PODIATRY | Facility: CLINIC | Age: 59
End: 2024-08-26
Payer: COMMERCIAL

## 2024-08-27 DIAGNOSIS — S96.821A LACERATION OF EXTENSOR TENDON OF RIGHT FOOT, INITIAL ENCOUNTER: ICD-10-CM

## 2024-08-27 RX ORDER — OXYCODONE AND ACETAMINOPHEN 7.5; 325 MG/1; MG/1
1 TABLET ORAL EVERY 6 HOURS PRN
Qty: 28 TABLET | Refills: 0 | Status: SHIPPED | OUTPATIENT
Start: 2024-08-27

## 2024-08-29 ENCOUNTER — OFFICE VISIT (OUTPATIENT)
Dept: PODIATRY | Facility: CLINIC | Age: 59
End: 2024-08-29
Payer: COMMERCIAL

## 2024-08-29 VITALS
SYSTOLIC BLOOD PRESSURE: 95 MMHG | BODY MASS INDEX: 28.13 KG/M2 | HEIGHT: 63 IN | WEIGHT: 158.75 LBS | DIASTOLIC BLOOD PRESSURE: 69 MMHG | HEART RATE: 90 BPM

## 2024-08-29 DIAGNOSIS — M96.89 DISORDER OF TENDON REPAIR: ICD-10-CM

## 2024-08-29 DIAGNOSIS — S96.821A LACERATION OF EXTENSOR TENDON OF RIGHT FOOT, INITIAL ENCOUNTER: Primary | ICD-10-CM

## 2024-08-29 PROCEDURE — 1159F MED LIST DOCD IN RCRD: CPT | Mod: CPTII,S$GLB,, | Performed by: PODIATRIST

## 2024-08-29 PROCEDURE — 3074F SYST BP LT 130 MM HG: CPT | Mod: CPTII,S$GLB,, | Performed by: PODIATRIST

## 2024-08-29 PROCEDURE — 3078F DIAST BP <80 MM HG: CPT | Mod: CPTII,S$GLB,, | Performed by: PODIATRIST

## 2024-08-29 PROCEDURE — 99024 POSTOP FOLLOW-UP VISIT: CPT | Mod: S$GLB,,, | Performed by: PODIATRIST

## 2024-08-29 PROCEDURE — 1160F RVW MEDS BY RX/DR IN RCRD: CPT | Mod: CPTII,S$GLB,, | Performed by: PODIATRIST

## 2024-08-29 PROCEDURE — 99999 PR PBB SHADOW E&M-EST. PATIENT-LVL IV: CPT | Mod: PBBFAC,,, | Performed by: PODIATRIST

## 2024-08-31 NOTE — PROGRESS NOTES
Subjective:      Patient ID: Judy Foley is a 58 y.o. female.    Chief Complaint:   Post-op Evaluation (# 4 suture removal )    Judy is a 58 y.o. female who presents to the podiatry clinic  with complaint of  right foot pain. Onset of the symptoms was several days ago. Precipitating event: injured with a kitchen knife . Current symptoms include: inability to bear weight. Aggravating factors: any weight bearing. Symptoms have gradually worsened. Patient has had no prior foot problems. Evaluation to date: none. Treatment to date: avoidance of offending activity. Patients rates pain 8/10 on pain scale.    8/29:  Patient presents to clinic for post-op visit #4 for R foot EHL tendon repair.  Patient reports improvement pain to her R foot that has been manageable with meds. She is conducting excersises to her R foot. Dressings have been clean, dry, and intact.  Has been compliant with wearing post-op shoe at all times.  Denies fevers, chills, nausea, or vomiting.  Denies SOB or chest pain denies any new pedal complaints.    Review of Systems   Constitutional: Negative for chills, decreased appetite, fever and malaise/fatigue.   HENT:  Negative for congestion, hearing loss, nosebleeds and tinnitus.    Eyes:  Negative for double vision, pain, photophobia and visual disturbance.   Cardiovascular:  Negative for chest pain, claudication, cyanosis and leg swelling.   Respiratory:  Negative for cough, hemoptysis, shortness of breath and wheezing.    Endocrine: Negative for cold intolerance and heat intolerance.   Hematologic/Lymphatic: Negative for adenopathy and bleeding problem.   Skin:  Negative for color change, dry skin, itching, nail changes and suspicious lesions.   Musculoskeletal:  Positive for joint pain, joint swelling and stiffness. Negative for arthritis.   Gastrointestinal:  Negative for abdominal pain, jaundice, nausea and vomiting.   Genitourinary:  Negative for dysuria, frequency and hematuria.   Neurological:   Negative for difficulty with concentration, loss of balance, numbness, paresthesias and sensory change.   Psychiatric/Behavioral:  Negative for altered mental status, hallucinations and suicidal ideas. The patient is not nervous/anxious.    Allergic/Immunologic: Negative for environmental allergies and persistent infections.         Objective:      Physical Exam  Vitals reviewed.   Constitutional:       Appearance: She is well-developed.   HENT:      Head: Normocephalic and atraumatic.   Cardiovascular:      Pulses:           Dorsalis pedis pulses are 2+ on the right side and 2+ on the left side.        Posterior tibial pulses are 2+ on the right side and 2+ on the left side.   Pulmonary:      Effort: Pulmonary effort is normal.   Musculoskeletal:         General: Normal range of motion.      Comments: R EHL 3/4 muscle strength.  All other muscle groups normal muscle strength bilateral.    The R hallux was noted to be slightly plantarflexed   Skin:     General: Skin is warm and dry.      Capillary Refill: Capillary refill takes 2 to 3 seconds.      Comments: Skin turgor is normal bilaterally.  Skin texture is well hydrated to both lower extremities.  No lesions or rashes or wounds appreciated bilaterally.  Nail plates 1 through 5 bilaterally are within normal limits for length and thickness.  No nail clubbing or incurvation noted.    Sutures intact without dehiscence.  No erythema.  Swelling noted.   Neurological:      Mental Status: She is alert and oriented to person, place, and time.      Comments: Sharp dull light touch vibratory proprioceptive sensation are intact bilaterally.  Deep tendon reflexes to patellar and Achilles tendon are symmetrical 2 over 4 bilaterally.  No ankle clonus or Babinski reflexes noted bilaterally.  Coordination is normal to both feet and lower extremities.    No tenderness to palpation   Psychiatric:         Behavior: Behavior normal.               Assessment:       Encounter Diagnoses    Name Primary?    Laceration of extensor tendon of right foot, initial encounter Yes    Disorder of tendon repair      Independent visualization of imaging was performed.  Results were reviewed in detail with patient.       Plan:       Judy was seen today for post-op evaluation.    Diagnoses and all orders for this visit:    Laceration of extensor tendon of right foot, initial encounter  -     Ambulatory referral/consult to Physical/Occupational Therapy; Future    Disorder of tendon repair  -     Ambulatory referral/consult to Physical/Occupational Therapy; Future      -I counseled the patient on her conditions, their implications and medical management.  -Exam findings discussed with the patient.  Discussed with the patient's hallux slightly plantar flexed.  -R hallux splinted in rectus/slightly dorsiflexed angle to allow for appropriate scarring EHL tendon  -R foot dressed with Xeroform, gauze, florencio and ACE wrap  -WBAT in post-op shoe at all time  -Refferel to Physical Therapy  -RT see 1 week for R foot post-op follow up    Janny Hearn DPM   Podiatric Medicine & Surgery  [facility]

## 2024-09-02 NOTE — TELEPHONE ENCOUNTER
No care due was identified.  [facility] Embedded Care Due Messages. Reference number: [id number].   9/02/2024 4:11:32 AM CDT

## 2024-09-03 RX ORDER — SUMATRIPTAN SUCCINATE 25 MG/1
TABLET ORAL
Qty: 9 TABLET | Refills: 1 | Status: SHIPPED | OUTPATIENT
Start: 2024-09-03

## 2024-09-07 ENCOUNTER — PATIENT MESSAGE (OUTPATIENT)
Dept: PODIATRY | Facility: CLINIC | Age: 59
End: 2024-09-07
Payer: COMMERCIAL

## 2024-09-07 ENCOUNTER — NURSE TRIAGE (OUTPATIENT)
Dept: ADMINISTRATIVE | Facility: CLINIC | Age: 59
End: 2024-09-07
Payer: COMMERCIAL

## 2024-09-07 NOTE — TELEPHONE ENCOUNTER
Patient states she had surgery with Dr. Gomez, 8/2 for tendon repair. She states she is out of her pain RX. States Dr. Gomez told her to call for refill if needed. States she needs it as it is hurting, denies any need to be seen but just wants refill. Advised of controlled substance policy with refill requests during non office hours. Triage done - jessie Advised will send message to provider to be seen on Monday and she could also send message via My Chart. Stressed to patient if she was concerned to present to ED. Verb understanding. Reason for Disposition   Caller requesting a CONTROLLED substance prescription refill (e.g., narcotics, ADHD medicines)    Protocols used: Medication Refill and Renewal Call-A-AH

## 2024-09-07 NOTE — TELEPHONE ENCOUNTER
No contact made with this patient by this nurse.    Reason for Disposition   Caller has already spoken with another triager and has no further questions.    Protocols used: No Contact or Duplicate Contact Call-A-AH

## 2024-09-08 NOTE — PROGRESS NOTES
Subjective:      Patient ID: Judy Foley is a 58 y.o. female.    Chief Complaint:   Post-op Evaluation (# 4 right foot )    Judy is a 58 y.o. female who presents to the podiatry clinic  with complaint of  right foot pain. Onset of the symptoms was several days ago. Precipitating event: injured with a kitchen knife . Current symptoms include: inability to bear weight. Aggravating factors: any weight bearing. Symptoms have gradually worsened. Patient has had no prior foot problems. Evaluation to date: none. Treatment to date: avoidance of offending activity. Patients rates pain 8/10 on pain scale.    8/8:  Patient presents to clinic for post-op visit #3 for R foot EHL tendon repair.  Patient reports mild pain to her R foot that has been manageable with meds.  Dressings have been clean, dry, and intact.  Has been compliant with wearing post-op shoe at all times.  Denies fevers, chills, nausea, or vomiting.  Denies SOB or chest pain denies any new pedal complaints.    Review of Systems   Constitutional: Negative for chills, decreased appetite, fever and malaise/fatigue.   HENT:  Negative for congestion, hearing loss, nosebleeds and tinnitus.    Eyes:  Negative for double vision, pain, photophobia and visual disturbance.   Cardiovascular:  Negative for chest pain, claudication, cyanosis and leg swelling.   Respiratory:  Negative for cough, hemoptysis, shortness of breath and wheezing.    Endocrine: Negative for cold intolerance and heat intolerance.   Hematologic/Lymphatic: Negative for adenopathy and bleeding problem.   Skin:  Negative for color change, dry skin, itching, nail changes and suspicious lesions.   Musculoskeletal:  Positive for joint pain, joint swelling and stiffness. Negative for arthritis.   Gastrointestinal:  Negative for abdominal pain, jaundice, nausea and vomiting.   Genitourinary:  Negative for dysuria, frequency and hematuria.   Neurological:  Negative for difficulty with concentration, loss of  balance, numbness, paresthesias and sensory change.   Psychiatric/Behavioral:  Negative for altered mental status, hallucinations and suicidal ideas. The patient is not nervous/anxious.    Allergic/Immunologic: Negative for environmental allergies and persistent infections.         Objective:      Physical Exam  Vitals reviewed.   Constitutional:       Appearance: She is well-developed.   HENT:      Head: Normocephalic and atraumatic.   Cardiovascular:      Pulses:           Dorsalis pedis pulses are 2+ on the right side and 2+ on the left side.        Posterior tibial pulses are 2+ on the right side and 2+ on the left side.   Pulmonary:      Effort: Pulmonary effort is normal.   Musculoskeletal:         General: Normal range of motion.      Comments: R EHL 3/4 muscle strength.  All other muscle groups normal muscle strength bilateral.    The R hallux was noted to be slightly plantarflexed   Skin:     General: Skin is warm and dry.      Capillary Refill: Capillary refill takes 2 to 3 seconds.      Comments: Skin turgor is normal bilaterally.  Skin texture is well hydrated to both lower extremities.  No lesions or rashes or wounds appreciated bilaterally.  Nail plates 1 through 5 bilaterally are within normal limits for length and thickness.  No nail clubbing or incurvation noted.    Sutures intact without dehiscence.  No erythema.  Swelling noted.   Neurological:      Mental Status: She is alert and oriented to person, place, and time.      Comments: Sharp dull light touch vibratory proprioceptive sensation are intact bilaterally.  Deep tendon reflexes to patellar and Achilles tendon are symmetrical 2 over 4 bilaterally.  No ankle clonus or Babinski reflexes noted bilaterally.  Coordination is normal to both feet and lower extremities.    No tenderness to palpation   Psychiatric:         Behavior: Behavior normal.               Assessment:       Encounter Diagnosis   Name Primary?    Laceration of extensor tendon of  right foot, initial encounter Yes     Independent visualization of imaging was performed.  Results were reviewed in detail with patient.       Plan:       Judy was seen today for post-op evaluation.    Diagnoses and all orders for this visit:    Laceration of extensor tendon of right foot, initial encounter      -I counseled the patient on her conditions, their implications and medical management.  -exam findings discussed with the patient.  Discussed with the patient's hallux slightly plantar flexed.  -R hallux splinted in rectus/slightly dorsiflexed angle to allow for appropriate scarring EHL tendon  -R foot dressed with Xeroform, gauze, florencio and ACE wrap  -WBAT in post-op shoe at all time  -Rx Percocet and gabapentin p.r.n. for pain  -RT see 1 week for R foot post-op follow up    Janny Hearn DPM   Podiatric Medicine & Surgery  [facility]

## 2024-09-09 ENCOUNTER — PATIENT MESSAGE (OUTPATIENT)
Dept: PODIATRY | Facility: CLINIC | Age: 59
End: 2024-09-09
Payer: COMMERCIAL

## 2024-09-09 RX ORDER — OXYCODONE AND ACETAMINOPHEN 7.5; 325 MG/1; MG/1
1 TABLET ORAL EVERY 8 HOURS PRN
Qty: 30 TABLET | Refills: 0 | Status: SHIPPED | OUTPATIENT
Start: 2024-09-09

## 2024-09-16 ENCOUNTER — CLINICAL SUPPORT (OUTPATIENT)
Dept: REHABILITATION | Facility: HOSPITAL | Age: 59
End: 2024-09-16
Attending: PODIATRIST
Payer: COMMERCIAL

## 2024-09-16 DIAGNOSIS — M25.674 DECREASED ROM OF RIGHT TOE: ICD-10-CM

## 2024-09-16 DIAGNOSIS — M96.89 DISORDER OF TENDON REPAIR: ICD-10-CM

## 2024-09-16 DIAGNOSIS — R29.898 DECREASED STRENGTH OF LOWER EXTREMITY: Primary | ICD-10-CM

## 2024-09-16 DIAGNOSIS — R26.2 DIFFICULTY WALKING INVOLVING FOOT: ICD-10-CM

## 2024-09-16 DIAGNOSIS — S96.821A LACERATION OF EXTENSOR TENDON OF RIGHT FOOT, INITIAL ENCOUNTER: ICD-10-CM

## 2024-09-16 PROCEDURE — 97161 PT EVAL LOW COMPLEX 20 MIN: CPT | Mod: PN

## 2024-09-16 PROCEDURE — 97110 THERAPEUTIC EXERCISES: CPT | Mod: PN

## 2024-09-17 RX ORDER — OXYCODONE AND ACETAMINOPHEN 7.5; 325 MG/1; MG/1
1 TABLET ORAL EVERY 8 HOURS PRN
Qty: 30 TABLET | Refills: 0 | Status: SHIPPED | OUTPATIENT
Start: 2024-09-17

## 2024-09-17 NOTE — PLAN OF CARE
[facility]  Physical Therapy Initial Evaluation    Name: Judy Foley  Clinic Number: [id number]    Therapy Diagnosis:   Encounter Diagnoses   Name Primary?    Laceration of extensor tendon of right foot, initial encounter     Disorder of tendon repair     Decreased strength of lower extremity Yes    Decreased ROM of right toe     Difficulty walking involving foot      Physician: Kurt Gomez DPM    Physician Orders: PT Eval and Treat   Medical Diagnosis from Referral:   S96.821A (ICD-10-CM) - Laceration of extensor tendon of right foot, initial encounter   M96.89 (ICD-10-CM) - Disorder of tendon repair     Evaluation Date: 9/16/2024  Authorization Period Expiration: 12/31/24  Plan of Care Expiration: 12/13/24  Visit # / Visits authorized: 1/ 20  FOTO: 1/10    Time In: 10:07  Time Out: 11:00  Total Billable Time: 53 minutes (mod Complexity Evaluation, Therapeutic Exercise - 1)    Precautions: Standard, migraine, anxiety, WBAT in boot; avoid excess plantarflexion with toe flexion     Subjective   Date of onset:6/7/24, DOS:  8/2/24  History of current condition - Judy reports: she dropped a kitchen knife on 6/7/24. Placed in boot and then had surgery 8/2/24. Having a lot of pain in the last few weeks since the surgery. She reports sensation of swelling, stinging and burning into other toes as well. Short boot donned at all times. Sutures removed 2 weeks ago. She and her  wrap toe to keep it in a neutral position. Was noted to be slightly plantarflexed at last podiatry appointment. Sleeps with difficulty. Does take nerve medication with partial relief. History of bunion surgery on same foot 5-6 years ago.      Medical History:   Past Medical History:   Diagnosis Date    GERD (gastroesophageal reflux disease)        Surgical History:   Judy Foley  has a past surgical history that includes Rotator cuff repair (Left); Bunionectomy; Cholecystectomy; Hysterectomy; Hernia repair;  colonoscopy (12/18/2017); Trigger finger release (Left, 09/22/2020); Excision of ganglion cyst of hand (Left, 09/22/2020); Tonsillectomy (1970); and Repair of extensor tendon (Right, 8/2/2024).    Medications:   Judy has a current medication list which includes the following prescription(s): butalbital-acetaminophen-caffeine -40 mg, escitalopram oxalate, famotidine, gabapentin, multivitamin, oxycodone-acetaminophen, oxycodone-acetaminophen, pantoprazole, sulfamethoxazole-trimethoprim 400-80mg, and sumatriptan.    Allergies:   Review of patient's allergies indicates:  No Known Allergies     Imaging, : see chart    Prior Therapy: left RTC repair years ago   Social History:  lives with their family  Occupation: not working   Prior Level of Function: independent with ADLs   Current Level of Function: limited walking, alternates without assistive device, crutches, and scooter   DME: crutches, scooter     Pain:   Current 7/10, worst 10/10, best 4/10   Location: right dorsum of foot   Description: Aching, pressure, sharp, stinging, throbbing  Aggravating Factors: standing and walking, dependent position  Easing Factors: pain medication and elevation    Pts goals: walk normally and reduce pain     Objective   Ace bandage over foot and ankle and toe wrapped into slight extension with coban bandage with padding to incision and medial foot   Unable to visualize incision due to bandaging, no signs of infection or drainage, controlled swelling.     Posture: 1st toe mildly plantarflexed  Palpation: nikos-incisional tenderness to dorsum of foot along first ray   Sensation: mild numbness along incision     Range of Motion/Strength:     Ankle Right Left Pain/Dysfunction with Movement   AROM/PROM      dorsiflexion  5  10    plantarflexion  30 (gentle active to tolerance)  53    inversion  30  45    eversion  10  28    Toe extension 30 (gentle passive) 60      L/E MMT Right Left Pain/Dysfunction with Movement   Hip Flexion 4+/5  4+/5    Hip Extension 4+/5 4+/5    Hip Abduction 4+/5 4+/5    Knee Flexion 4+/5 4+/5    Knee Extension 4+/5 4+/5    Ankle DF Not tested  5/5    Ankle PF Not tested  4+/5    Ankle Inversion Not tested  5/5    Ankle Eversion Not tested  5/5    Big Toe Extension 3-/5 5/5        Joint Mobility:   Ankle: normal subtalar     Flexibility: mild decreased calf flexibility    Gait Analysis:Without AD Assistance ind Deviations: fair heel strike in boot, mild antalgic gait    TU seconds (in boot)  TUG Cutoff Scores:13.5        Balance deferred due to weightbearing status    CMS Impairment/Limitation/Restriction for FOTO ankle Survey    Therapist reviewed FOTO scores for Judy Foley on 2024.   FOTO documents entered into [employer] - see Media section.    Limitation Score: 69%  Category: Mobility         TREATMENT   Treatment Time In: 10:50  Treatment Time Out: 11:00  Total Treatment time separate from Evaluation: 10 minutes    Judy received therapeutic exercises to develop strength, endurance, ROM, flexibility, posture, and core stabilization for 10 minutes including:  Passive dorsiflexion (tendon gliding)  Straight leg raise  Sidelying abduction   Continuation of flexion isometric at neutral followed by gentle passive toe extension as prescribed by surgeon    Home Exercises Provided and Patient Education Provided     Education provided:   Anatomy and Pathology.  Symptom management and plan of care progressions.  Home Exercise Program.      Written Home Exercises Provided: yes.  Exercises were reviewed and Judy was able to demonstrate them prior to the end of the session.  Judy demonstrated good  understanding of the education provided.     See EMR under Patient Instructions for exercises provided 2024.      Assessment   Judy is a 58 y.o. female referred to outpatient Physical Therapy with a medical diagnosis of Laceration of extensor tendon of right foot, initial encounter, Disorder of tendon repair. Pt presents 6  weeks 3 days (8/2/24) s/p right EHL repair. Patient presents in short boot WBAT with mild antalgic gait. Does utilize crutches/scooter occasionally at home. Incision appears to be healing well with no sign of infection, though unable to fully visualize due to bandage splinting left intact. Very mild toe plantarflexion noted. Good understanding of self splinting with coban. Patient with limited ankle range of motion, decreased strength, impaired balance and gait, and decreased functional mobility. Patients impairments impact ADLs, standing, walking, household chores.     Pt prognosis is Good.   Pt will benefit from skilled outpatient Physical Therapy to address the deficits stated above and in the chart below, provide pt/family education, and to maximize pt's level of independence.     Plan of care discussed with patient: Yes  Pt's spiritual, cultural and educational needs considered and patient is agreeable to the plan of care and goals as stated below:     Anticipated Barriers for therapy: co-morbidities    Medical Necessity is demonstrated by the following  History  Co-morbidities and personal factors that may impact the plan of care [x] LOW: no personal factors / co-morbidities  [] MODERATE: 1-2 personal factors / co-morbidities  [] HIGH: 3+ personal factors / co-morbidities    Moderate / High Support Documentation:   Co-morbidities affecting plan of care: see PMHx     Personal Factors:   no deficits     Examination  Body Structures and Functions, activity limitations and participation restrictions that may impact the plan of care [] LOW: addressing 1-2 elements  [x] MODERATE: 3+ elements  [] HIGH: 4+ elements (please support below)    Moderate / High Support Documentation: range of motion, strength, balance, gait, posture, transitions, motor control     Clinical Presentation [x] LOW: stable  [] MODERATE: Evolving  [] HIGH: Unstable     Decision Making/ Complexity Score: low         Goals:  Short Term Goals (6  Weeks):   1. Patient will be independent with home exercise program to supplement therapy in improving functional mobility.  2. Patient will improve dorsiflexion active range of motion with knee extended to 8 degrees to improve gait.   3. Patient will improve passive toe extension to 60  4. Patient will walk 300 ft in tennis shoe without gait impairment.     Long Term Goals (12 Weeks):   1. Patient will improve FOTO score to </= 41% limited to decrease perceived limitation with mobility.   2. Patient will improve impaired lower extremity strength to >/= 4+/5 to improve strength for functional tasks.  3. Patient will improve active toe extension to 50 or greater .  4. Patient will improve single leg balance duration to 30 sec to improve stability.   5. Pt will walk on unlevel surfaces without AD or gait deficits to promote community mobility.     Plan   Plan of care Certification: 9/16/2024 to 12/13/24.    Outpatient Physical Therapy 2 times weekly for 12 weeks to include the following interventions: Electrical Stimulation , Gait Training, Manual Therapy, Moist Heat/ Ice, Neuromuscular Re-ed, Patient Education, Therapeutic Activities, and Therapeutic Exercise, ASTYM, Kinesiotaping PRN, Functional Dry Needling      MAXX Lentz, PT, DPT, OCS

## 2024-09-19 ENCOUNTER — CLINICAL SUPPORT (OUTPATIENT)
Dept: REHABILITATION | Facility: HOSPITAL | Age: 59
End: 2024-09-19
Payer: COMMERCIAL

## 2024-09-19 DIAGNOSIS — R29.898 DECREASED STRENGTH OF LOWER EXTREMITY: Primary | ICD-10-CM

## 2024-09-19 DIAGNOSIS — R26.2 DIFFICULTY WALKING INVOLVING FOOT: ICD-10-CM

## 2024-09-19 DIAGNOSIS — M25.674 DECREASED ROM OF RIGHT TOE: ICD-10-CM

## 2024-09-19 PROCEDURE — 97110 THERAPEUTIC EXERCISES: CPT | Mod: PN,CQ

## 2024-09-19 PROCEDURE — 97140 MANUAL THERAPY 1/> REGIONS: CPT | Mod: PN,CQ

## 2024-09-19 NOTE — PROGRESS NOTES
[facility]   Physical Therapy Treatment Note      Name: Judy West  Clinic Number: [id number]    Therapy Diagnosis:   Encounter Diagnoses   Name Primary?    Decreased strength of lower extremity Yes    Decreased ROM of right toe     Difficulty walking involving foot      Physician: Kurt Gomez DPM    Visit Date: 9/19/2024    Physician Orders: PT Eval and Treat   Medical Diagnosis from Referral:   S96.821A (ICD-10-CM) - Laceration of extensor tendon of right foot, initial encounter   M96.89 (ICD-10-CM) - Disorder of tendon repair      Evaluation Date: 9/16/2024  Authorization Period Expiration: 12/31/24  Plan of Care Expiration: 12/13/24  Visit # / Visits authorized: 1/ 20  FOTO: 1/10  PTA Visit #: 1/5      Time In: 9:10  Time Out: 9:55  Total Billable Time: 45 minutes (1MT, Therapeutic Exercise - 2)     Precautions: Standard, migraine, anxiety, WBAT in boot; avoid excess plantarflexion with toe flexion       Subjective     Patient reports: Walking initially hurts but once she loosens up more tolerable.  She was compliant with home exercise program.  Response to previous treatment: Eval only  Functional change: Ongoing    Pain: 5-6/10  Location: right dorsum of foot     Objective      Objective Measures updated at progress report unless specified.     Treatment     Judy received the treatments listed below:      therapeutic exercises to develop strength, endurance, ROM, flexibility, posture, and core stabilization for 25 minutes including:  Nu step 6' lvl 3  SLR 2 x 10   Side-lying abd 2 x 10   LAQ 2 x 10 red band   HS Curl 2 x 10 red band   Hold toe in extension. Have patient actively hold there while you remove your hand. Hold for 5 seconds. Repeat 5-10 times. (Place and Hold) next    manual therapy techniques: Joint mobilizations, Myofacial release, and Soft tissue Mobilization were applied to the: R foot for 20 minutes, including:  Passive dorsiflexion and passive toe  extension    neuromuscular re-education activities to improve: Balance, Coordination, Kinesthetic, Sense, Proprioception, and Posture for 00 minutes. The following activities were included:  Arch lifts next    Patient Education and Home Exercises       Education provided:   Anatomy and Pathology.  Symptom management and plan of care progressions.  Home Exercise Program.    Written Home Exercises Provided: Pt instructed to continue prior HEP. Exercises were reviewed and Judy was able to demonstrate them prior to the end of the session.  Judy demonstrated good  understanding of the education provided. See Electronic Medical Record under Patient Instructions for exercises provided during therapy sessions    Assessment     Judy is a 58 y.o. female referred to outpatient Physical Therapy with a medical diagnosis of Laceration of extensor tendon of right foot, initial encounter, Disorder of tendon repair. Pt presents 6 weeks 3 days (8/2/24) s/p right EHL repair. Patient presents in short boot WBAT with mild antalgic gait. Not using any AD at home. Most pain with initial steps in middle of night when going to use bathroom and in morning. Improves as she walks more. Unable to assess incision due to bandage splinting left intact. Patient voices that incision appears to be healing well with no sign of infection, mild redness/irritation that she attributes to reaction to bandage. Session focused on dorsiflexion and great toe passive flexion with care to avoid toe flexion or excessive PF. Mild burning at incision site but voices this is not out of her usual sensations throughout the day. Extensive education on precautions to limit stress on tendon site and ensure proper healing. Monitor response to treatment session.     Judy Is progressing well towards her goals.   Patient prognosis is Good.     Patient will continue to benefit from skilled outpatient physical therapy to address the deficits listed in the problem list box on  initial evaluation, provide pt/family education and to maximize pt's level of independence in the home and community environment.     Patient's spiritual, cultural and educational needs considered and pt agreeable to plan of care and goals.     Anticipated barriers to physical therapy: co-morbidities     Goals:   Short Term Goals (6 Weeks):   1. Patient will be independent with home exercise program to supplement therapy in improving functional mobility.  2. Patient will improve dorsiflexion active range of motion with knee extended to 8 degrees to improve gait.   3. Patient will improve passive toe extension to 60  4. Patient will walk 300 ft in tennis shoe without gait impairment.      Long Term Goals (12 Weeks):   1. Patient will improve FOTO score to </= 41% limited to decrease perceived limitation with mobility.   2. Patient will improve impaired lower extremity strength to >/= 4+/5 to improve strength for functional tasks.  3. Patient will improve active toe extension to 50 or greater .  4. Patient will improve single leg balance duration to 30 sec to improve stability.   5. Pt will walk on unlevel surfaces without AD or gait deficits to promote community mobility.     Plan     Plan of care Certification: 9/16/2024 to 12/13/24.     Loren Jones, PTA

## 2024-09-24 ENCOUNTER — CLINICAL SUPPORT (OUTPATIENT)
Dept: REHABILITATION | Facility: HOSPITAL | Age: 59
End: 2024-09-24
Payer: COMMERCIAL

## 2024-09-24 DIAGNOSIS — M25.674 DECREASED ROM OF RIGHT TOE: ICD-10-CM

## 2024-09-24 DIAGNOSIS — R26.2 DIFFICULTY WALKING INVOLVING FOOT: ICD-10-CM

## 2024-09-24 DIAGNOSIS — R29.898 DECREASED STRENGTH OF LOWER EXTREMITY: Primary | ICD-10-CM

## 2024-09-24 PROCEDURE — 97110 THERAPEUTIC EXERCISES: CPT | Mod: PN

## 2024-09-24 PROCEDURE — 97140 MANUAL THERAPY 1/> REGIONS: CPT | Mod: PN

## 2024-09-24 PROCEDURE — 97112 NEUROMUSCULAR REEDUCATION: CPT | Mod: PN

## 2024-09-24 RX ORDER — BUTALBITAL, ACETAMINOPHEN AND CAFFEINE 50; 325; 40 MG/1; MG/1; MG/1
1 TABLET ORAL EVERY 6 HOURS PRN
Qty: 30 TABLET | Refills: 5 | Status: SHIPPED | OUTPATIENT
Start: 2024-09-24

## 2024-09-24 NOTE — PROGRESS NOTES
[facility]   Physical Therapy Treatment Note      Name: Judy West  Clinic Number: [id number]    Therapy Diagnosis:   Encounter Diagnoses   Name Primary?    Decreased strength of lower extremity Yes    Decreased ROM of right toe     Difficulty walking involving foot      Physician: Kurt Gomez DPM    Visit Date: 9/24/2024    Physician Orders: PT Eval and Treat   Medical Diagnosis from Referral:   S96.821A (ICD-10-CM) - Laceration of extensor tendon of right foot, initial encounter   M96.89 (ICD-10-CM) - Disorder of tendon repair      Evaluation Date: 9/16/2024  Authorization Period Expiration: 12/31/24  Plan of Care Expiration: 12/13/24  Visit # / Visits authorized: 2/ 20  FOTO: 3/10  PTA Visit #: 1/5      Time In: 11:06  Time Out: 12:00  Total Billable Time: 54 minutes (1MT, Therapeutic Exercise - 2, 1 NM)     Precautions: Standard, migraine, anxiety, WBAT in boot; avoid excess plantarflexion with toe flexion       Subjective     Patient reports: bad pain Friday night, doing a little better today. Thinks she wrapped it too tight. Sees podiatry tomorrow.   She was compliant with home exercise program.  Response to previous treatment: mild sore  Functional change: Ongoing    Pain: 5/10  Location: right dorsum of foot     Objective      Objective Measures updated at progress report unless specified.     Treatment     Judy received the treatments listed below:      therapeutic exercises to develop strength, endurance, ROM, flexibility, posture, and core stabilization for 30 minutes including:  Nu step 6' lvl 3  SLR 2 x 10   Side-lying abd 2 x 10   LAQ 2 x 10 red band   HS Curl 2 x 10 red band   Hold toe in extension. Have patient actively hold there while you remove your hand. Hold for 5 seconds. Repeat 5-10 times. (Place and Hold) next  Toe flexion (10 deg) with foot held in dorsiflexion x20   Seated heel slides 20x     manual therapy techniques: Joint mobilizations, Myofacial release,  "and Soft tissue Mobilization were applied to the: R foot for 16 minutes, including:  Passive dorsiflexion and passive toe extension  Passive dip flexion in dorsiflexion     neuromuscular re-education activities to improve: Balance, Coordination, Kinesthetic, Sense, Proprioception, and Posture for 8 minutes. The following activities were included:  Arch lifts 20x5"     Patient Education and Home Exercises       Education provided:   Anatomy and Pathology.  Symptom management and plan of care progressions.  Home Exercise Program.    Written Home Exercises Provided: Pt instructed to continue prior HEP. Exercises were reviewed and Judy was able to demonstrate them prior to the end of the session.  Judy demonstrated good  understanding of the education provided. See Electronic Medical Record under Patient Instructions for exercises provided during therapy sessions    Assessment     Judy is a 58 y.o. female referred to outpatient Physical Therapy with a medical diagnosis of Laceration of extensor tendon of right foot, initial encounter, Disorder of tendon repair. Pt presents 7 weeks 4 days (8/2/24) s/p right EHL repair. Patient presents in short boot WBAT with mild antalgic gait. Not using any AD at home. Some recent pain from having tape too tight but relieved once she re-wrapped her foot. Left bandage splinting intact throughout session but removed ace bandage to allow for increased ankle range of motion. Continued to work on passive dorsiflexion and toe extension. Added gentle toe flexion in dorsiflexion position to slack tendon. Continues to have intermittent burning at incision that catches her randomly but no worse. Continue to progress as tolerated. Follow up with podiatry tomorrow.     Judy Is progressing well towards her goals.   Patient prognosis is Good.     Patient will continue to benefit from skilled outpatient physical therapy to address the deficits listed in the problem list box on initial evaluation, " provide pt/family education and to maximize pt's level of independence in the home and community environment.     Patient's spiritual, cultural and educational needs considered and pt agreeable to plan of care and goals.     Anticipated barriers to physical therapy: co-morbidities     Goals:   Short Term Goals (6 Weeks):   1. Patient will be independent with home exercise program to supplement therapy in improving functional mobility. Progressing, not met  2. Patient will improve dorsiflexion active range of motion with knee extended to 8 degrees to improve gait. Progressing, not met  3. Patient will improve passive toe extension to 60. Progressing, not met  4. Patient will walk 300 ft in tennis shoe without gait impairment. Progressing, not met     Long Term Goals (12 Weeks):   1. Patient will improve FOTO score to </= 41% limited to decrease perceived limitation with mobility. Progressing, not met  2. Patient will improve impaired lower extremity strength to >/= 4+/5 to improve strength for functional tasks.Progressing, not met  3. Patient will improve active toe extension to 50 or greater . Progressing, not met  4. Patient will improve single leg balance duration to 30 sec to improve stability. Progressing, not met  5. Pt will walk on unlevel surfaces without AD or gait deficits to promote community mobility. Progressing, not met    Plan     Plan of care Certification: 9/16/2024 to 12/13/24.     MAXX Lentz, PT

## 2024-09-24 NOTE — TELEPHONE ENCOUNTER
No care due was identified.  [facility] Embedded Care Due Messages. Reference number: [id number].   9/24/2024 3:40:46 PM CDT

## 2024-09-25 ENCOUNTER — PATIENT MESSAGE (OUTPATIENT)
Dept: PODIATRY | Facility: CLINIC | Age: 59
End: 2024-09-25
Payer: COMMERCIAL

## 2024-09-25 ENCOUNTER — TELEPHONE (OUTPATIENT)
Dept: REHABILITATION | Facility: HOSPITAL | Age: 59
End: 2024-09-25
Payer: COMMERCIAL

## 2024-09-25 ENCOUNTER — OFFICE VISIT (OUTPATIENT)
Dept: PODIATRY | Facility: CLINIC | Age: 59
End: 2024-09-25
Payer: COMMERCIAL

## 2024-09-25 VITALS
HEIGHT: 63 IN | BODY MASS INDEX: 28.12 KG/M2 | SYSTOLIC BLOOD PRESSURE: 108 MMHG | DIASTOLIC BLOOD PRESSURE: 74 MMHG | HEART RATE: 87 BPM

## 2024-09-25 DIAGNOSIS — S96.821A LACERATION OF EXTENSOR TENDON OF RIGHT FOOT, INITIAL ENCOUNTER: Primary | ICD-10-CM

## 2024-09-25 DIAGNOSIS — T81.30XA WOUND DEHISCENCE: ICD-10-CM

## 2024-09-25 PROCEDURE — 3078F DIAST BP <80 MM HG: CPT | Mod: CPTII,S$GLB,, | Performed by: PODIATRIST

## 2024-09-25 PROCEDURE — 99024 POSTOP FOLLOW-UP VISIT: CPT | Mod: S$GLB,,, | Performed by: PODIATRIST

## 2024-09-25 PROCEDURE — 3074F SYST BP LT 130 MM HG: CPT | Mod: CPTII,S$GLB,, | Performed by: PODIATRIST

## 2024-09-25 PROCEDURE — 1159F MED LIST DOCD IN RCRD: CPT | Mod: CPTII,S$GLB,, | Performed by: PODIATRIST

## 2024-09-25 PROCEDURE — 87186 SC STD MICRODIL/AGAR DIL: CPT | Performed by: PODIATRIST

## 2024-09-25 PROCEDURE — 99999 PR PBB SHADOW E&M-EST. PATIENT-LVL IV: CPT | Mod: PBBFAC,,, | Performed by: PODIATRIST

## 2024-09-25 PROCEDURE — 87075 CULTR BACTERIA EXCEPT BLOOD: CPT | Performed by: PODIATRIST

## 2024-09-25 PROCEDURE — 87070 CULTURE OTHR SPECIMN AEROBIC: CPT | Performed by: PODIATRIST

## 2024-09-25 PROCEDURE — 1160F RVW MEDS BY RX/DR IN RCRD: CPT | Mod: CPTII,S$GLB,, | Performed by: PODIATRIST

## 2024-09-25 RX ORDER — SULFAMETHOXAZOLE AND TRIMETHOPRIM 400; 80 MG/1; MG/1
1 TABLET ORAL 2 TIMES DAILY
Qty: 20 TABLET | Refills: 0 | Status: SHIPPED | OUTPATIENT
Start: 2024-09-25

## 2024-09-25 RX ORDER — OXYCODONE AND ACETAMINOPHEN 10; 325 MG/1; MG/1
1 TABLET ORAL EVERY 8 HOURS PRN
Qty: 30 TABLET | Refills: 0 | Status: SHIPPED | OUTPATIENT
Start: 2024-09-25

## 2024-09-25 NOTE — TELEPHONE ENCOUNTER
Call to patient to follow up on MD visit. Patient reported she didn't realize she had some drainage. Suspected possible infection and cultures taken of wound. Will be starting wound care and recommended holding PT 2 weeks and starting antibiotic. Will be in touch regarding resuming therapy.     MAXX Lentz, PT

## 2024-09-27 ENCOUNTER — PATIENT MESSAGE (OUTPATIENT)
Dept: PODIATRY | Facility: CLINIC | Age: 59
End: 2024-09-27
Payer: COMMERCIAL

## 2024-09-27 ENCOUNTER — TELEPHONE (OUTPATIENT)
Dept: WOUND CARE | Facility: HOSPITAL | Age: 59
End: 2024-09-27
Payer: COMMERCIAL

## 2024-09-27 NOTE — TELEPHONE ENCOUNTER
Left voice message with call back number requesting return call in regards to wound care referral placed by Dr. Gomez

## 2024-09-28 LAB — BACTERIA SPEC AEROBE CULT: ABNORMAL

## 2024-09-30 LAB — BACTERIA SPEC ANAEROBE CULT: NORMAL
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
## 2024-10-03 ENCOUNTER — PATIENT MESSAGE (OUTPATIENT)
Dept: PODIATRY | Facility: CLINIC | Age: 59
End: 2024-10-03
Payer: COMMERCIAL

## 2024-10-03 DIAGNOSIS — S96.821A LACERATION OF EXTENSOR TENDON OF RIGHT FOOT, INITIAL ENCOUNTER: Primary | ICD-10-CM

## 2024-10-03 RX ORDER — AMITRIPTYLINE HYDROCHLORIDE 10 MG/1
10 TABLET, FILM COATED ORAL NIGHTLY PRN
Qty: 30 TABLET | Refills: 2 | Status: SHIPPED | OUTPATIENT
Start: 2024-10-03 | End: 2025-10-03

## 2024-10-03 RX ORDER — OXYCODONE AND ACETAMINOPHEN 10; 325 MG/1; MG/1
1 TABLET ORAL EVERY 6 HOURS PRN
Qty: 27 TABLET | Refills: 0 | Status: SHIPPED | OUTPATIENT
Start: 2024-10-03 | End: 2024-10-11 | Stop reason: SDUPTHER

## 2024-10-03 RX ORDER — GABAPENTIN 300 MG/1
300 CAPSULE ORAL 3 TIMES DAILY
Qty: 90 CAPSULE | Refills: 11 | Status: SHIPPED | OUTPATIENT
Start: 2024-10-03 | End: 2025-10-03

## 2024-10-07 ENCOUNTER — HOSPITAL ENCOUNTER (OUTPATIENT)
Dept: WOUND CARE | Facility: HOSPITAL | Age: 59
Discharge: HOME | End: 2024-10-07
Attending: PREVENTIVE MEDICINE
Payer: COMMERCIAL

## 2024-10-07 ENCOUNTER — PATIENT MESSAGE (OUTPATIENT)
Dept: PODIATRY | Facility: CLINIC | Age: 59
End: 2024-10-07
Payer: COMMERCIAL

## 2024-10-07 VITALS — SYSTOLIC BLOOD PRESSURE: 102 MMHG | HEART RATE: 65 BPM | DIASTOLIC BLOOD PRESSURE: 67 MMHG | RESPIRATION RATE: 18 BRPM

## 2024-10-07 DIAGNOSIS — T81.30XA WOUND DEHISCENCE: ICD-10-CM

## 2024-10-07 DIAGNOSIS — L97.512 ULCER OF RIGHT FOOT WITH FAT LAYER EXPOSED: Primary | ICD-10-CM

## 2024-10-07 DIAGNOSIS — S96.821S: ICD-10-CM

## 2024-10-07 PROBLEM — S96.821A: Status: ACTIVE | Noted: 2024-10-07

## 2024-10-07 PROCEDURE — 99203 OFFICE O/P NEW LOW 30 MIN: CPT | Mod: 25

## 2024-10-07 PROCEDURE — 29581 APPL MULTLAYER CMPRN SYS LEG: CPT

## 2024-10-07 PROCEDURE — 11042 DBRDMT SUBQ TIS 1ST 20SQCM/<: CPT

## 2024-10-07 RX ORDER — GABAPENTIN 100 MG/1
100 CAPSULE ORAL 3 TIMES DAILY
Qty: 90 CAPSULE | Refills: 1 | OUTPATIENT
Start: 2024-10-07

## 2024-10-07 NOTE — PROCEDURES
"Debridement    Date/Time: 10/7/2024 1:00 PM    Performed by: Sonu Whittaker MD  Authorized by: Sonu Whittaker MD    Time out: Immediately prior to procedure a "time out" was called to verify the correct patient, procedure, equipment, support staff and site/side marked as required.    Consent Done?:  Yes (Written)  Local anesthesia used?: Yes    Local anesthetic:  Topical anesthetic    Wound Details:    Location:  Right foot    Location:  Right Dorsal Foot    Type of Debridement:  Excisional       Length (cm):  2.5       Area (sq cm):  5       Width (cm):  2       Percent Debrided (%):  100       Depth (cm):  0.5       Total Area Debrided (sq cm):  5    Depth of debridement:  Subcutaneous tissue    Tissue debrided:  Subcutaneous    Devitalized tissue debrided:  Slough, Fibrin and Exudate    Instruments:  Forceps and Scissors  Bleeding:  Minimal  Hemostasis Achieved: Yes  Method Used:  Pressure    "

## 2024-10-07 NOTE — TELEPHONE ENCOUNTER
Refused Prescriptions     Name from pharmacy: GABAPENTIN 100MG CAPSULES         Will file in chart as: gabapentin (NEURONTIN) 100 MG capsule    Sig: TAKE 1 CAPSULE(100 MG) BY MOUTH THREE TIMES DAILY    Disp: 90 capsule    Refills: 1 (Pharmacy requested: Not specified)    Start: 10/7/2024    Class: Normal    Refused by: Kurt Gomez DPM    Refusal reason: Refill not appropriate        Fill requested from: [id number] - [place], [place] - [address] AT [facility] [address]
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
## 2024-10-11 DIAGNOSIS — S96.821A LACERATION OF EXTENSOR TENDON OF RIGHT FOOT, INITIAL ENCOUNTER: ICD-10-CM

## 2024-10-11 RX ORDER — OXYCODONE AND ACETAMINOPHEN 10; 325 MG/1; MG/1
1 TABLET ORAL EVERY 6 HOURS PRN
Qty: 27 TABLET | Refills: 0 | Status: CANCELLED | OUTPATIENT
Start: 2024-10-11

## 2024-10-11 RX ORDER — OXYCODONE AND ACETAMINOPHEN 10; 325 MG/1; MG/1
1 TABLET ORAL EVERY 6 HOURS PRN
Qty: 27 TABLET | Refills: 0 | Status: SHIPPED | OUTPATIENT
Start: 2024-10-11

## 2024-10-11 RX ORDER — NALOXONE HYDROCHLORIDE 4 MG/.1ML
SPRAY NASAL
Qty: 1 EACH | Refills: 11 | Status: SHIPPED | OUTPATIENT
Start: 2024-10-11

## 2024-10-14 ENCOUNTER — HOSPITAL ENCOUNTER (OUTPATIENT)
Dept: WOUND CARE | Facility: HOSPITAL | Age: 59
Discharge: HOME | End: 2024-10-14
Attending: PREVENTIVE MEDICINE
Payer: COMMERCIAL

## 2024-10-14 DIAGNOSIS — S96.821S: ICD-10-CM

## 2024-10-14 DIAGNOSIS — L97.512 ULCER OF RIGHT FOOT WITH FAT LAYER EXPOSED: Primary | ICD-10-CM

## 2024-10-14 PROCEDURE — 11042 DBRDMT SUBQ TIS 1ST 20SQCM/<: CPT

## 2024-10-14 NOTE — PROGRESS NOTES
Wound Care & Hyperbaric Medicine    Subjective:       Patient ID: Judy Foley is a 59 y.o. female.    Chief Complaint: Non-healing Wound Follow Up    Follow up visit. Wound improving. Tolerating compression well. No apparent signs of infection noted. No complaints voiced. No changes in plan of care.    Review of Systems   All other systems reviewed and are negative.        Objective:   There were no vitals filed for this visit.      Physical Exam       Wound 10/07/24 1318 Other (comment) Right dorsal Foot (Active)   10/07/24 1318   Present on Original Admission: Y   Primary Wound Type: Other   Side: Right   Orientation: dorsal   Location: Foot   Wound Approximate Age at First Assessment (Weeks):    Wound Number:    Is this injury device related?:    Incision Type:    Closure Method:    Wound Description (Comments):    Type:    Additional Comments:    Ankle-Brachial Index:    Pulses: +2   Removal Indication and Assessment:    Wound Outcome:    Wound Image   10/14/24 1338   Dressing Appearance Moist drainage 10/14/24 1338   Drainage Amount Small 10/14/24 1338   Drainage Characteristics/Odor Serosanguineous 10/14/24 1338   Appearance Red;Granulating;Yellow 10/14/24 1338   Tissue loss description Full thickness 10/14/24 1338   Red (%), Wound Tissue Color 80 % 10/14/24 1338   Yellow (%), Wound Tissue Color 20 % 10/14/24 1338   Periwound Area Intact;Dry 10/14/24 1338   Wound Edges Defined 10/14/24 1338   Wound Length (cm) 2 cm 10/14/24 1338   Wound Width (cm) 1.4 cm 10/14/24 1338   Wound Depth (cm) 0.4 cm 10/14/24 1338   Wound Volume (cm^3) 1.12 cm^3 10/14/24 1338   Wound Surface Area (cm^2) 2.8 cm^2 10/14/24 1338   Care Cleansed with:;Antimicrobial agent 10/14/24 1338   Dressing Applied;Calcium alginate;Silver;Absorptive Pad 10/14/24 1338   Periwound Care Moisture barrier applied 10/14/24 1338   Compression Two layer compression 10/14/24 1338   Dressing Change Due 10/21/24 10/14/24 1338          Assessment/Plan:         ICD-10-CM ICD-9-CM   1. Ulcer of right foot with fat layer exposed  L97.512 707.15   2. Laceration of extensor tendon of right foot, sequela  S96.821S 906.1       Wound is improving. No signs of infection or necrosis.      Tissue pathology and/or culture taken:  [] Yes [x] No   Sharp debridement performed:   [x] Yes [] No   Labs ordered this visit:   [] Yes [x] No   Imaging ordered this visit:   [] Yes [x] No           Orders Placed This Encounter   Procedures    Change dressing     Right dorsal foot wound:  Cleanse wound with:Vashe  Lidocaine:prn  Silver nitrate:prn  Periwound care:lotion to leg  Primary dressing:silver alginate  Secondary dressing:small ABD pad  Offloading:darco shoe  Edema control: Urgo K2, flexinet  Frequency:weekly  Follow-up:1 week with         Follow up in about 1 week (around 10/21/2024).            This includes face to face time and non-face to face time preparing to see the patient (eg, review of tests), obtaining and/or reviewing separately obtained history, documenting clinical information in the electronic or other health record, independently interpreting results and communicating results to the patient/family/caregiver, or care coordinator.

## 2024-10-14 NOTE — PROCEDURES
"Debridement    Date/Time: 10/14/2024 1:00 PM    Performed by: Sonu Whittaker MD  Authorized by: Sonu Whittaker MD    Time out: Immediately prior to procedure a "time out" was called to verify the correct patient, procedure, equipment, support staff and site/side marked as required.    Consent Done?:  Yes (Written)  Local anesthesia used?: Yes    Local anesthetic:  Topical anesthetic    Type of Debridement:  Excisional       Length (cm):  1.4       Area (sq cm):  0.56       Width (cm):  0.4       Percent Debrided (%):  100       Depth (cm):  0.4       Total Area Debrided (sq cm):  0.56    Depth of debridement:  Subcutaneous tissue    Tissue debrided:  Subcutaneous and Hypergranulation    Devitalized tissue debrided:  Slough, Exudate and Fibrin    Instruments:  Curette  Bleeding:  Minimal  Hemostasis Achieved: Yes  Method Used:  Pressure  Patient tolerance:  Patient tolerated the procedure well with no immediate complications    "

## 2024-10-16 ENCOUNTER — OFFICE VISIT (OUTPATIENT)
Dept: PRIMARY CARE CLINIC | Facility: CLINIC | Age: 59
End: 2024-10-16
Payer: COMMERCIAL

## 2024-10-16 VITALS
HEART RATE: 80 BPM | DIASTOLIC BLOOD PRESSURE: 70 MMHG | BODY MASS INDEX: 28.35 KG/M2 | OXYGEN SATURATION: 98 % | SYSTOLIC BLOOD PRESSURE: 110 MMHG | WEIGHT: 160 LBS | HEIGHT: 63 IN

## 2024-10-16 DIAGNOSIS — L97.512 ULCER OF RIGHT FOOT WITH FAT LAYER EXPOSED: ICD-10-CM

## 2024-10-16 DIAGNOSIS — Z00.00 ANNUAL PHYSICAL EXAM: Primary | ICD-10-CM

## 2024-10-16 DIAGNOSIS — K21.9 GASTROESOPHAGEAL REFLUX DISEASE WITHOUT ESOPHAGITIS: ICD-10-CM

## 2024-10-16 DIAGNOSIS — E66.811 CLASS 1 OBESITY DUE TO EXCESS CALORIES WITHOUT SERIOUS COMORBIDITY WITH BODY MASS INDEX (BMI) OF 31.0 TO 31.9 IN ADULT: ICD-10-CM

## 2024-10-16 DIAGNOSIS — R26.2 DIFFICULTY WALKING INVOLVING FOOT: ICD-10-CM

## 2024-10-16 DIAGNOSIS — E66.09 CLASS 1 OBESITY DUE TO EXCESS CALORIES WITHOUT SERIOUS COMORBIDITY WITH BODY MASS INDEX (BMI) OF 31.0 TO 31.9 IN ADULT: ICD-10-CM

## 2024-10-16 DIAGNOSIS — G43.009 MIGRAINE WITHOUT AURA AND WITHOUT STATUS MIGRAINOSUS, NOT INTRACTABLE: ICD-10-CM

## 2024-10-16 DIAGNOSIS — N95.9 MENOPAUSAL DISORDER: ICD-10-CM

## 2024-10-16 DIAGNOSIS — F41.9 ANXIETY: ICD-10-CM

## 2024-10-16 PROBLEM — M25.674 DECREASED ROM OF RIGHT TOE: Status: RESOLVED | Noted: 2024-09-16 | Resolved: 2024-10-16

## 2024-10-16 PROBLEM — R29.898 DECREASED STRENGTH OF LOWER EXTREMITY: Status: RESOLVED | Noted: 2024-09-16 | Resolved: 2024-10-16

## 2024-10-16 PROBLEM — S96.821A: Status: RESOLVED | Noted: 2024-10-07 | Resolved: 2024-10-16

## 2024-10-16 PROBLEM — M65.332 TRIGGER MIDDLE FINGER OF LEFT HAND: Status: RESOLVED | Noted: 2020-09-03 | Resolved: 2024-10-16

## 2024-10-16 PROCEDURE — 99999 PR PBB SHADOW E&M-EST. PATIENT-LVL III: CPT | Mod: PBBFAC,,, | Performed by: INTERNAL MEDICINE

## 2024-10-16 RX ORDER — ESCITALOPRAM OXALATE 20 MG/1
20 TABLET ORAL DAILY
Qty: 90 TABLET | Refills: 3 | Status: SHIPPED | OUTPATIENT
Start: 2024-10-16 | End: 2025-10-16

## 2024-10-16 NOTE — PROGRESS NOTES
[facility] Note    Chief Complaint      Chief Complaint   Patient presents with    Annual Exam     History of Present Illness      Judy Foley is a 59 y.o. female who presents today for Annual preventative visit.  Patient comes to appointment .  GI: [place]    Problem List Items Addressed This Visit       Anxiety    Current Assessment & Plan     Stable on lexapro 20 mg daily, no SI/HI/panic attacks.  MIL passed so has been less stressed.           Relevant Medications    EScitalopram oxalate (LEXAPRO) 20 MG tablet    Class 1 obesity due to excess calories without serious comorbidity with body mass index (BMI) of 31.0 to 31.9 in adult    Relevant Orders    Hemoglobin A1C    Difficulty walking involving foot    Gastroesophageal reflux disease without esophagitis    Current Assessment & Plan     Stable on protonix, works well for her.  No reflux/nausea/bloating.  Had EGD in past, had repair of hiatal hernia in past.  Now sees [facility]. Trying to eat more healthy.         Menopausal disorder    Current Assessment & Plan     Stable on topical estradiol, works well.  Sees GYN.         Migraine without status migrainosus, not intractable    Current Assessment & Plan     Getting one per month, takes fioricet PRN.         Ulcer of right foot with fat layer exposed    Current Assessment & Plan     After surgery with Dr. Gomez, seeing wound care.          Other Visit Diagnoses       Annual physical exam    -  Primary    Relevant Orders    CBC Auto Differential    Lipid Panel    Comprehensive Metabolic Panel            Health Maintenance   Topic Date Due    Mammogram  11/22/2022    Colorectal Cancer Screening  12/18/2027    Lipid Panel  04/14/2028    TETANUS VACCINE  12/19/2028    Hepatitis C Screening  Completed    Shingles Vaccine  Completed       Past Medical History:   Diagnosis Date    GERD (gastroesophageal reflux disease)        Past Surgical History:   Procedure Laterality Date    BUNIONECTOMY       CHOLECYSTECTOMY      colonoscopy  12/18/2017    Normal    EXCISION OF GANGLION CYST OF HAND Left 09/22/2020    Procedure: EXCISION, GANGLION CYST, HAND;  Surgeon: Ulysses Turner Jr., MD;  Location: [facility] OR;  Service: Orthopedics;  Laterality: Left;    HERNIA REPAIR      HYSTERECTOMY      REPAIR OF EXTENSOR TENDON Right 8/2/2024    Procedure: REPAIR, TENDON, EXTENSOR;  Surgeon: Kurt Gomez DPM;  Location: [facility] OR;  Service: Podiatry;  Laterality: Right;  with Graft Jacket    ROTATOR CUFF REPAIR Left     TONSILLECTOMY  1970    TRIGGER FINGER RELEASE Left 09/22/2020    Procedure: RELEASE, TRIGGER FINGER;  Surgeon: Ulysses Turner Jr., MD;  Location: [facility] OR;  Service: Orthopedics;  Laterality: Left;       family history includes Arthritis in her mother; Cancer in her mother; Heart disease in her daughter, daughter, and father; Intellectual disability in her daughter; Pulmonary fibrosis in her mother.    Social History     Tobacco Use    Smoking status: Never    Smokeless tobacco: Never   Substance Use Topics    Alcohol use: Yes     Comment: OCASSIONALLY    Drug use: Never       Review of Systems   Constitutional:  Negative for chills and fever.   Respiratory:  Negative for cough and shortness of breath.    Cardiovascular:  Negative for chest pain and palpitations.   Gastrointestinal:  Negative for constipation, diarrhea, nausea and vomiting.   Genitourinary:  Negative for dysuria and hematuria.   Musculoskeletal:  Negative for falls.   Neurological:  Negative for headaches.        Outpatient Encounter Medications as of 10/16/2024   Medication Sig Dispense Refill    amitriptyline (ELAVIL) 10 MG tablet Take 1 tablet (10 mg total) by mouth nightly as needed for Insomnia. 30 tablet 2    butalbital-acetaminophen-caffeine -40 mg (FIORICET, ESGIC) -40 mg per tablet Take 1 tablet by mouth every 6 (six) hours as needed. 30 tablet 5    famotidine (PEPCID) 40 MG tablet Take 40 mg by mouth every evening.       gabapentin (NEURONTIN) 300 MG capsule Take 1 capsule (300 mg total) by mouth 3 (three) times daily. 90 capsule 11    multivitamin capsule Take 1 capsule by mouth once daily.      oxyCODONE-acetaminophen (PERCOCET)  mg per tablet Take 1 tablet by mouth every 6 (six) hours as needed for Pain. 27 tablet 0    pantoprazole (PROTONIX) 40 MG tablet Take 40 mg by mouth every morning.      sumatriptan (IMITREX) 25 MG Tab TAKE 1 TABLET BY MOUTH EVERY 2 HOURS AS NEEDED 9 tablet 1    [DISCONTINUED] EScitalopram oxalate (LEXAPRO) 20 MG tablet Take 1 tablet (20 mg total) by mouth once daily. 90 tablet 3    EScitalopram oxalate (LEXAPRO) 20 MG tablet Take 1 tablet (20 mg total) by mouth once daily. 90 tablet 3    [DISCONTINUED] butalbital-acetaminophen-caffeine -40 mg (FIORICET, ESGIC) -40 mg per tablet TAKE 1 TABLET BY MOUTH EVERY 6 HOURS AS NEEDED 30 tablet 5    [DISCONTINUED] gabapentin (NEURONTIN) 100 MG capsule Take 1 capsule (100 mg total) by mouth 3 (three) times daily. 90 capsule 1    [DISCONTINUED] naloxone (NARCAN) 4 mg/actuation Spry 4mg by nasal route as needed for opioid overdose; may repeat every 2-3 minutes in alternating nostrils until medical help arrives. Call 911 (Patient not taking: Reported on 10/16/2024) 1 each 11    [DISCONTINUED] oxyCODONE-acetaminophen (PERCOCET)  mg per tablet Take 1 tablet by mouth every 8 (eight) hours as needed for Pain. (Patient not taking: Reported on 10/16/2024) 30 tablet 0    [DISCONTINUED] oxyCODONE-acetaminophen (PERCOCET)  mg per tablet Take 1 tablet by mouth every 6 (six) hours as needed for Pain. 27 tablet 0    [DISCONTINUED] oxyCODONE-acetaminophen (PERCOCET) 7.5-325 mg per tablet Take 1 tablet by mouth every 6 (six) hours as needed for Pain. 28 tablet 0    [DISCONTINUED] oxyCODONE-acetaminophen (PERCOCET) 7.5-325 mg per tablet Take 1 tablet by mouth every 8 (eight) hours as needed for Pain. (Patient not taking: Reported on 9/25/2024) 30 tablet 0  "   [DISCONTINUED] sulfamethoxazole-trimethoprim 400-80mg (BACTRIM,SEPTRA) 400-80 mg per tablet Take 1 tablet by mouth 2 (two) times daily. 20 tablet 0    [DISCONTINUED] sulfamethoxazole-trimethoprim 400-80mg (BACTRIM,SEPTRA) 400-80 mg per tablet Take 1 tablet by mouth 2 (two) times daily. 20 tablet 0     No facility-administered encounter medications on file as of 10/16/2024.        Review of patient's allergies indicates:  No Known Allergies    Physical Exam      Vital Signs  Pulse: 80  SpO2: 98 %  BP: 110/70  Pain Score:   4  Pain Loc: Foot  Height and Weight  Height: 5' 3" (160 cm)  Weight: 72.6 kg (160 lb)  BSA (Calculated - sq m): 1.8 sq meters  BMI (Calculated): 28.4  Weight in (lb) to have BMI = 25: 140.8]    Physical Exam  Constitutional:       Appearance: She is well-developed.   HENT:      Head: Normocephalic and atraumatic.   Cardiovascular:      Rate and Rhythm: Normal rate and regular rhythm.      Heart sounds: Normal heart sounds. No murmur heard.  Pulmonary:      Effort: Pulmonary effort is normal. No respiratory distress.      Breath sounds: Normal breath sounds.   Abdominal:      General: There is no distension.      Palpations: Abdomen is soft.      Tenderness: There is no abdominal tenderness. There is no guarding.   Skin:     General: Skin is warm and dry.   Neurological:      Mental Status: She is alert. Mental status is at baseline.   Psychiatric:         Behavior: Behavior normal.          Laboratory:  CBC:  Recent Labs   Lab Result Units 07/29/24  1144   WBC K/uL 5.77   RBC M/uL 3.89*   Hemoglobin g/dL 10.3*   Hematocrit % 33.5*   Platelets K/uL 350   MCV fL 86   MCH pg 26.5*   MCHC g/dL 30.7*     CMP:  Recent Labs   Lab Result Units 07/29/24  1144   Glucose mg/dL 79   Calcium mg/dL 9.6   Albumin g/dL 3.9   Total Protein g/dL 7.2   Sodium mmol/L 142   Potassium mmol/L 5.1   CO2 mmol/L 25   Chloride mmol/L 107   BUN mg/dL 23*   Alkaline Phosphatase U/L 99   ALT U/L 26   AST U/L 14   Total " "Bilirubin mg/dL 0.2     URINALYSIS:  No results for input(s): "COLORU", "CLARITYU", "SPECGRAV", "PHUR", "PROTEINUA", "GLUCOSEU", "BILIRUBINCON", "BLOODU", "WBCU", "RBCU", "BACTERIA", "MUCUS", "NITRITE", "LEUKOCYTESUR", "UROBILINOGEN", "HYALINECASTS" in the last 2160 hours.   LIPIDS:  No results for input(s): "TSH", "HDL", "CHOL", "TRIG", "LDLCALC", "CHOLHDL", "NONHDLCHOL", "TOTALCHOLEST" in the last 2160 hours.  TSH:  No results for input(s): "TSH" in the last 2160 hours.  A1C:  No results for input(s): "HGBA1C" in the last 2160 hours.    Radiology:  No results found in the last 30 days.     Assessment/Plan     Judy Foley is a 59 y.o.female with:    1. Annual physical exam  - CBC Auto Differential; Future  - Lipid Panel; Future  - Comprehensive Metabolic Panel; Future    2. Anxiety  - EScitalopram oxalate (LEXAPRO) 20 MG tablet; Take 1 tablet (20 mg total) by mouth once daily.  Dispense: 90 tablet; Refill: 3    3. Gastroesophageal reflux disease without esophagitis    4. Class 1 obesity due to excess calories without serious comorbidity with body mass index (BMI) of 31.0 to 31.9 in adult  - Hemoglobin A1C; Future    5. Menopausal disorder    6. Ulcer of right foot with fat layer exposed    7. Difficulty walking involving foot    8. Migraine without aura and without status migrainosus, not intractable    -labs ordered  -Continue current medications and maintain follow up with specialists.    -No follow-ups on file.       Rachel Wise MD  [facility]              "

## 2024-10-21 ENCOUNTER — LAB VISIT (OUTPATIENT)
Dept: LAB | Facility: HOSPITAL | Age: 59
End: 2024-10-21
Attending: INTERNAL MEDICINE
Payer: COMMERCIAL

## 2024-10-21 ENCOUNTER — HOSPITAL ENCOUNTER (OUTPATIENT)
Dept: WOUND CARE | Facility: HOSPITAL | Age: 59
Discharge: HOME | End: 2024-10-21
Attending: PREVENTIVE MEDICINE
Payer: COMMERCIAL

## 2024-10-21 VITALS
HEART RATE: 82 BPM | TEMPERATURE: 98 F | HEIGHT: 63 IN | DIASTOLIC BLOOD PRESSURE: 65 MMHG | BODY MASS INDEX: 28.35 KG/M2 | WEIGHT: 160 LBS | SYSTOLIC BLOOD PRESSURE: 101 MMHG

## 2024-10-21 DIAGNOSIS — E66.811 CLASS 1 OBESITY DUE TO EXCESS CALORIES WITHOUT SERIOUS COMORBIDITY WITH BODY MASS INDEX (BMI) OF 31.0 TO 31.9 IN ADULT: ICD-10-CM

## 2024-10-21 DIAGNOSIS — E66.09 CLASS 1 OBESITY DUE TO EXCESS CALORIES WITHOUT SERIOUS COMORBIDITY WITH BODY MASS INDEX (BMI) OF 31.0 TO 31.9 IN ADULT: ICD-10-CM

## 2024-10-21 DIAGNOSIS — Z00.00 ANNUAL PHYSICAL EXAM: ICD-10-CM

## 2024-10-21 DIAGNOSIS — L97.512 ULCER OF RIGHT FOOT WITH FAT LAYER EXPOSED: Primary | ICD-10-CM

## 2024-10-21 DIAGNOSIS — S96.821S: ICD-10-CM

## 2024-10-21 DIAGNOSIS — S96.821A LACERATION OF EXTENSOR TENDON OF RIGHT FOOT, INITIAL ENCOUNTER: ICD-10-CM

## 2024-10-21 DIAGNOSIS — T81.30XA WOUND DEHISCENCE: ICD-10-CM

## 2024-10-21 LAB
ALBUMIN SERPL BCP-MCNC: 3.9 G/DL (ref 3.5–5.2)
ALP SERPL-CCNC: 81 U/L (ref 40–150)
ALT SERPL W/O P-5'-P-CCNC: 8 U/L (ref 10–44)
ANION GAP SERPL CALC-SCNC: 9 MMOL/L (ref 8–16)
AST SERPL-CCNC: 13 U/L (ref 10–40)
BASOPHILS # BLD AUTO: 0.06 K/UL (ref 0–0.2)
BASOPHILS NFR BLD: 1 % (ref 0–1.9)
BILIRUB SERPL-MCNC: 0.3 MG/DL (ref 0.1–1)
BUN SERPL-MCNC: 16 MG/DL (ref 6–20)
CALCIUM SERPL-MCNC: 9.7 MG/DL (ref 8.7–10.5)
CHLORIDE SERPL-SCNC: 108 MMOL/L (ref 95–110)
CHOLEST SERPL-MCNC: 180 MG/DL (ref 120–199)
CHOLEST/HDLC SERPL: 2.7 {RATIO} (ref 2–5)
CO2 SERPL-SCNC: 25 MMOL/L (ref 23–29)
CREAT SERPL-MCNC: 0.9 MG/DL (ref 0.5–1.4)
DIFFERENTIAL METHOD BLD: ABNORMAL
EOSINOPHIL # BLD AUTO: 0.2 K/UL (ref 0–0.5)
EOSINOPHIL NFR BLD: 3.7 % (ref 0–8)
ERYTHROCYTE [DISTWIDTH] IN BLOOD BY AUTOMATED COUNT: 15.3 % (ref 11.5–14.5)
EST. GFR  (NO RACE VARIABLE): >60 ML/MIN/1.73 M^2
ESTIMATED AVG GLUCOSE: 100 MG/DL (ref 68–131)
GLUCOSE SERPL-MCNC: 92 MG/DL (ref 70–110)
HBA1C MFR BLD: 5.1 % (ref 4–5.6)
HCT VFR BLD AUTO: 36.3 % (ref 37–48.5)
HDLC SERPL-MCNC: 67 MG/DL (ref 40–75)
HDLC SERPL: 37.2 % (ref 20–50)
HGB BLD-MCNC: 11.5 G/DL (ref 12–16)
IMM GRANULOCYTES # BLD AUTO: 0.02 K/UL (ref 0–0.04)
IMM GRANULOCYTES NFR BLD AUTO: 0.3 % (ref 0–0.5)
LDLC SERPL CALC-MCNC: 89.6 MG/DL (ref 63–159)
LYMPHOCYTES # BLD AUTO: 2.2 K/UL (ref 1–4.8)
LYMPHOCYTES NFR BLD: 34.7 % (ref 18–48)
MCH RBC QN AUTO: 28 PG (ref 27–31)
MCHC RBC AUTO-ENTMCNC: 31.7 G/DL (ref 32–36)
MCV RBC AUTO: 88 FL (ref 82–98)
MONOCYTES # BLD AUTO: 0.6 K/UL (ref 0.3–1)
MONOCYTES NFR BLD: 9.7 % (ref 4–15)
NEUTROPHILS # BLD AUTO: 3.2 K/UL (ref 1.8–7.7)
NEUTROPHILS NFR BLD: 50.6 % (ref 38–73)
NONHDLC SERPL-MCNC: 113 MG/DL
NRBC BLD-RTO: 0 /100 WBC
PLATELET # BLD AUTO: 389 K/UL (ref 150–450)
PMV BLD AUTO: 9.8 FL (ref 9.2–12.9)
POTASSIUM SERPL-SCNC: 4.4 MMOL/L (ref 3.5–5.1)
PROT SERPL-MCNC: 7.6 G/DL (ref 6–8.4)
RBC # BLD AUTO: 4.11 M/UL (ref 4–5.4)
SODIUM SERPL-SCNC: 142 MMOL/L (ref 136–145)
TRIGL SERPL-MCNC: 117 MG/DL (ref 30–150)
WBC # BLD AUTO: 6.29 K/UL (ref 3.9–12.7)

## 2024-10-21 PROCEDURE — 83036 HEMOGLOBIN GLYCOSYLATED A1C: CPT | Performed by: INTERNAL MEDICINE

## 2024-10-21 PROCEDURE — 85025 COMPLETE CBC W/AUTO DIFF WBC: CPT | Performed by: INTERNAL MEDICINE

## 2024-10-21 PROCEDURE — 80053 COMPREHEN METABOLIC PANEL: CPT | Performed by: INTERNAL MEDICINE

## 2024-10-21 PROCEDURE — 97597 DBRDMT OPN WND 1ST 20 CM/<: CPT

## 2024-10-21 PROCEDURE — 80061 LIPID PANEL: CPT | Performed by: INTERNAL MEDICINE

## 2024-10-21 NOTE — PROCEDURES
"Debridement    Date/Time: 10/21/2024 1:00 PM    Performed by: Sonu Whittaker MD  Authorized by: Sonu Whittaker MD    Time out: Immediately prior to procedure a "time out" was called to verify the correct patient, procedure, equipment, support staff and site/side marked as required.    Consent Done?:  Yes (Written)  Local anesthesia used?: Yes    Local anesthetic:  Topical anesthetic    Wound Details:    Location:  Right foot    Location:  Right Dorsal Foot    Type of Debridement:  Excisional       Length (cm):  2       Area (sq cm):  1       Width (cm):  0.5       Percent Debrided (%):  100       Depth (cm):  0.2       Total Area Debrided (sq cm):  1    Depth of debridement:  Epidermis/Dermis    Devitalized tissue debrided:  Slough, Fibrin, Exudate and Callus    Instruments:  Curette  Bleeding:  Minimal  Hemostasis Achieved: Yes  Method Used:  Pressure  Patient tolerance:  Patient tolerated the procedure well with no immediate complications    "

## 2024-10-21 NOTE — PROGRESS NOTES
Wound Care & Hyperbaric Medicine    Subjective:       Patient ID: Judy Foley is a 59 y.o. female.    Chief Complaint: Non-healing Wound Follow Up    Follow up for right dorsal foot wound. Wound improving. No complaints,continue plan of care.    Review of Systems   All other systems reviewed and are negative.        Objective:     Vitals:    10/21/24 1312   BP: 101/65   Pulse: 82   Temp: 98.4 °F (36.9 °C)         Physical Exam       Wound 10/07/24 1318 Other (comment) Right dorsal Foot (Active)   10/07/24 1318   Present on Original Admission: Y   Primary Wound Type: Other   Side: Right   Orientation: dorsal   Location: Foot   Wound Approximate Age at First Assessment (Weeks):    Wound Number:    Is this injury device related?:    Incision Type:    Closure Method:    Wound Description (Comments):    Type:    Additional Comments:    Ankle-Brachial Index:    Pulses: +2   Removal Indication and Assessment:    Wound Outcome:    Wound Image   10/21/24 1309   Dressing Appearance Moist drainage 10/21/24 1315   Drainage Amount Small 10/21/24 1315   Drainage Characteristics/Odor Serosanguineous 10/21/24 1315   Appearance Red;Granulating 10/21/24 1315   Tissue loss description Full thickness 10/21/24 1315   Red (%), Wound Tissue Color 90 % 10/21/24 1315   Yellow (%), Wound Tissue Color 10 % 10/21/24 1315   Periwound Area Dry;Intact 10/21/24 1315   Wound Edges Defined 10/21/24 1315   Wound Length (cm) 2 cm 10/21/24 1315   Wound Width (cm) 0.5 cm 10/21/24 1315   Wound Depth (cm) 0.2 cm 10/21/24 1315   Wound Volume (cm^3) 0.2 cm^3 10/21/24 1315   Wound Surface Area (cm^2) 1 cm^2 10/21/24 1315   Care Cleansed with:;Soap and water;Sterile normal saline;Antimicrobial agent 10/21/24 1315   Dressing Applied;Compression wrap;Absorptive Pad;Calcium alginate 10/21/24 1315   Periwound Care Moisturizer applied 10/21/24 1315   Compression Two layer compression 10/21/24 1315   Dressing Change Due 10/28/24 10/21/24 1315          Assessment/Plan:         ICD-10-CM ICD-9-CM   1. Ulcer of right foot with fat layer exposed  L97.512 707.15   2. Laceration of extensor tendon of right foot, sequela  S96.821S 906.1   3. Wound dehiscence  T81.30XA 998.30       Wound is improving. No signs of infection or necrosis.  Noncontact Real-Time Fluorescence Wound Imaging was used during assessment and debridement of the wound(s).      Tissue pathology and/or culture taken:  [] Yes [x] No   Sharp debridement performed:   [x] Yes [] No   Labs ordered this visit:   [] Yes [x] No   Imaging ordered this visit:   [] Yes [x] No           Orders Placed This Encounter   Procedures    Change dressing     Right dorsal foot wound:   Cleanse wound with:Vashe   Lidocaine:prn   Silver nitrate:prn   Periwound care:lotion to leg   Primary dressing:silver alginate   Secondary dressing:small ABD pad   Offloading:darco shoe   Edema control: Urgo K2, flexinet   Frequency:weekly   Follow-up:1 week with         Follow up in about 1 week (around 10/28/2024).            This includes face to face time and non-face to face time preparing to see the patient (eg, review of tests), obtaining and/or reviewing separately obtained history, documenting clinical information in the electronic or other health record, independently interpreting results and communicating results to the patient/family/caregiver, or care coordinator.

## 2024-10-22 RX ORDER — OXYCODONE AND ACETAMINOPHEN 10; 325 MG/1; MG/1
1 TABLET ORAL EVERY 6 HOURS PRN
Qty: 27 TABLET | Refills: 0 | Status: SHIPPED | OUTPATIENT
Start: 2024-10-22

## 2024-10-28 ENCOUNTER — HOSPITAL ENCOUNTER (OUTPATIENT)
Dept: WOUND CARE | Facility: HOSPITAL | Age: 59
Discharge: HOME | End: 2024-10-28
Attending: PREVENTIVE MEDICINE
Payer: COMMERCIAL

## 2024-10-28 DIAGNOSIS — T81.30XA WOUND DEHISCENCE: ICD-10-CM

## 2024-10-28 DIAGNOSIS — L97.512 ULCER OF RIGHT FOOT WITH FAT LAYER EXPOSED: Primary | ICD-10-CM

## 2024-10-28 DIAGNOSIS — S96.821S: ICD-10-CM

## 2024-10-28 PROCEDURE — 11042 DBRDMT SUBQ TIS 1ST 20SQCM/<: CPT

## 2024-10-31 DIAGNOSIS — S96.821A LACERATION OF EXTENSOR TENDON OF RIGHT FOOT, INITIAL ENCOUNTER: ICD-10-CM

## 2024-10-31 RX ORDER — OXYCODONE AND ACETAMINOPHEN 10; 325 MG/1; MG/1
1 TABLET ORAL EVERY 6 HOURS PRN
Qty: 27 TABLET | Refills: 0 | Status: SHIPPED | OUTPATIENT
Start: 2024-10-31

## 2024-11-04 ENCOUNTER — HOSPITAL ENCOUNTER (OUTPATIENT)
Dept: WOUND CARE | Facility: HOSPITAL | Age: 59
Discharge: HOME | End: 2024-11-04
Attending: PREVENTIVE MEDICINE
Payer: COMMERCIAL

## 2024-11-04 ENCOUNTER — PATIENT MESSAGE (OUTPATIENT)
Dept: PODIATRY | Facility: CLINIC | Age: 59
End: 2024-11-04
Payer: COMMERCIAL

## 2024-11-04 VITALS
TEMPERATURE: 98 F | HEART RATE: 78 BPM | WEIGHT: 160.06 LBS | DIASTOLIC BLOOD PRESSURE: 69 MMHG | BODY MASS INDEX: 28.36 KG/M2 | HEIGHT: 63 IN | SYSTOLIC BLOOD PRESSURE: 97 MMHG

## 2024-11-04 DIAGNOSIS — T81.30XA WOUND DEHISCENCE: ICD-10-CM

## 2024-11-04 DIAGNOSIS — S96.821S: ICD-10-CM

## 2024-11-04 DIAGNOSIS — L97.512 ULCER OF RIGHT FOOT WITH FAT LAYER EXPOSED: Primary | ICD-10-CM

## 2024-11-04 PROCEDURE — 97597 DBRDMT OPN WND 1ST 20 CM/<: CPT

## 2024-11-04 NOTE — PROGRESS NOTES
Wound Care & Hyperbaric Medicine    Subjective:       Patient ID: Judy Foley is a 59 y.o. female.    Chief Complaint: Non-healing Wound Follow Up    Follow up for right foot wound. States she has been having increased pain in her right foot. Not the wound specifically but the her foot. Currently in CAM boot.    Review of Systems   All other systems reviewed and are negative.        Objective:     Vitals:    11/04/24 1410   BP: 97/69   Pulse: 78   Temp: 97.8 °F (36.6 °C)         Physical Exam       Wound 10/07/24 1318 Other (comment) Right dorsal Foot (Active)   10/07/24 1318 Foot   Present on Original Admission: Y   Primary Wound Type: Other   Side: Right   Orientation: dorsal   Wound Approximate Age at First Assessment (Weeks):    Wound Number:    Is this injury device related?:    Incision Type:    Closure Method:    Wound Description (Comments):    Type:    Additional Comments:    Ankle-Brachial Index:    Pulses: +2   Removal Indication and Assessment:    Wound Outcome:    Wound Image   11/04/24 1433   Dressing Appearance Dried drainage 11/04/24 1433   Drainage Amount Scant 11/04/24 1433   Drainage Characteristics/Odor Serosanguineous 11/04/24 1433   Appearance Intact;Pink;Red 11/04/24 1433   Tissue loss description Full thickness 11/04/24 1433   Red (%), Wound Tissue Color 80 % 11/04/24 1433   Yellow (%), Wound Tissue Color 20 % 11/04/24 1433   Periwound Area Intact;Dry 11/04/24 1433   Wound Edges Defined 11/04/24 1433   Wound Length (cm) 0.4 cm 11/04/24 1433   Wound Width (cm) 1.5 cm 11/04/24 1433   Wound Depth (cm) 0.1 cm 11/04/24 1433   Wound Volume (cm^3) 0.06 cm^3 11/04/24 1433   Wound Surface Area (cm^2) 0.6 cm^2 11/04/24 1433   Care Cleansed with:;Antimicrobial agent;Sterile normal saline 11/04/24 1433   Dressing Silver;Tubular bandage;Island/border 11/04/24 1433   Dressing Change Due 11/18/24 11/04/24 1433         Assessment/Plan:         ICD-10-CM ICD-9-CM   1. Ulcer of right foot  with fat layer exposed  L97.512 707.15   2. Laceration of extensor tendon of right foot, sequela  S96.821S 906.1   3. Wound dehiscence  T81.30XA 998.30       Wound is improving. No signs of infection or necrosis.    No mobility restrictions from a wound standpoint.    Tissue pathology and/or culture taken:  [] Yes [x] No   Sharp debridement performed:   [x] Yes [] No   Labs ordered this visit:   [] Yes [x] No   Imaging ordered this visit:   [] Yes [x] No           Orders Placed This Encounter   Procedures    Change dressing     Right dorsal foot wound:   Cleanse wound with:Vashe   Lidocaine:prn   Silver nitrate:prn   Periwound care:lotion to leg   Primary dressing:Silver Alginate  Secondary dressing:Island Border   Offloading:darco shoe   Edema control: Tubi  F toes to knee.   Frequency:Weekly   Follow-up:2 weeks with         Follow up in about 2 weeks (around 11/18/2024).            This includes face to face time and non-face to face time preparing to see the patient (eg, review of tests), obtaining and/or reviewing separately obtained history, documenting clinical information in the electronic or other health record, independently interpreting results and communicating results to the patient/family/caregiver, or care coordinator.

## 2024-11-04 NOTE — PROCEDURES
"Debridement    Date/Time: 11/4/2024 2:00 PM    Performed by: Sonu Whittaker MD  Authorized by: Sonu Whittaker MD    Time out: Immediately prior to procedure a "time out" was called to verify the correct patient, procedure, equipment, support staff and site/side marked as required.    Consent Done?:  Yes (Written)  Local anesthesia used?: Yes    Local anesthetic:  Topical anesthetic    Wound Details:    Location:  Right foot    Location:  Right Dorsal Foot    Type of Debridement:  Excisional       Length (cm):  0.4       Area (sq cm):  0.6       Width (cm):  1.5       Percent Debrided (%):  100       Depth (cm):  0.1       Total Area Debrided (sq cm):  0.6    Depth of debridement:  Epidermis/Dermis    Tissue debrided:  Dermis and Epidermis    Devitalized tissue debrided:  Slough, Fibrin and Exudate    Instruments:  Curette  Bleeding:  Minimal  Hemostasis Achieved: Yes  Method Used:  Pressure  Patient tolerance:  Patient tolerated the procedure well with no immediate complications    "

## 2024-11-05 DIAGNOSIS — S96.821A LACERATION OF EXTENSOR TENDON OF RIGHT FOOT, INITIAL ENCOUNTER: ICD-10-CM

## 2024-11-05 RX ORDER — OXYCODONE AND ACETAMINOPHEN 10; 325 MG/1; MG/1
1 TABLET ORAL EVERY 6 HOURS PRN
Qty: 27 TABLET | Refills: 0 | Status: SHIPPED | OUTPATIENT
Start: 2024-11-05

## 2024-11-11 ENCOUNTER — CLINICAL SUPPORT (OUTPATIENT)
Dept: REHABILITATION | Facility: HOSPITAL | Age: 59
End: 2024-11-11
Attending: PREVENTIVE MEDICINE
Payer: COMMERCIAL

## 2024-11-11 DIAGNOSIS — M25.674 DECREASED ROM OF RIGHT TOE: ICD-10-CM

## 2024-11-11 DIAGNOSIS — R26.2 DIFFICULTY WALKING INVOLVING FOOT: Primary | ICD-10-CM

## 2024-11-11 DIAGNOSIS — R29.898 DECREASED STRENGTH OF LOWER EXTREMITY: ICD-10-CM

## 2024-11-11 PROCEDURE — 97110 THERAPEUTIC EXERCISES: CPT | Mod: PN

## 2024-11-11 PROCEDURE — 97140 MANUAL THERAPY 1/> REGIONS: CPT | Mod: PN

## 2024-11-11 PROCEDURE — 97112 NEUROMUSCULAR REEDUCATION: CPT | Mod: PN

## 2024-11-11 NOTE — PROGRESS NOTES
[facility]   Physical Therapy Treatment Note / plan of care      Name: Judy Foley  [facility] Number: [id number]    Therapy Diagnosis:   Encounter Diagnosis   Name Primary?    Difficulty walking involving foot Yes     Physician: Kurt Gomez DPM    Visit Date: 11/11/2024    Physician Orders: PT Eval and Treat   Medical Diagnosis from Referral:   S96.821A (ICD-10-CM) - Laceration of extensor tendon of right foot, initial encounter   M96.89 (ICD-10-CM) - Disorder of tendon repair      Evaluation Date: 9/16/2024  Authorization Period Expiration: 12/31/24  Plan of Care Expiration: 12/27/24  Visit # / Visits authorized: 3 20  FOTO: 4/10  PTA Visit #: 1/5      Time In: 2:05  Time Out: 3:00  Total Billable Time: 55 minutes (1MT, Therapeutic Exercise - 2, 1 NM)     Precautions: Standard, migraine, anxiety, WBAT in boot; avoid excess plantarflexion with toe flexion       Subjective     Patient reports: she had a wound dehiscence with infection. She went to wound care and had compression therapy until last Monday, released now and cleared to begin PT. Tiny part remains incompletely healed. She continues to closely monitor and changes bandages appropriately. Foot is sore, tender on the top. No follow ups scheduled.  Pain with standing and walking. Improves with rest. Takes boot off to sleep. Wears compression sock.   She was compliant with home exercise program.  Response to previous treatment: mild sore  Functional change: Ongoing    Pain: 3/10  Location: right dorsum of foot     Objective      Posture: 1st toe neutral alignment, 2nd toe slight ext  Palpation: nikos-incisional tenderness to dorsum of foot along first ray. Decreased nikos-incisional mobility, Mild tenderness 2nd plantar MTP joint   Sensation: normal light touch     No drainage, redness, warmth, swelling on bandage.     Range of Motion/Strength:      Ankle Right Left Pain/Dysfunction with Movement   AROM/PROM         dorsiflexion  10   "10     plantarflexion  33(gentle active to tolerance)  53     inversion  30  45     eversion  10  28     Toe extension 40* (gentle passive) 60     Toe flexion  3* 20 History of bilateral bunion sx       L/E MMT Right Left Pain/Dysfunction with Movement   Hip Flexion 5/5 5/5     Hip Extension 4+/5 4+/5     Hip Abduction 4+/5 4+/5     Knee Flexion 4+/5 4+/5     Knee Extension 5/5 5/5     Ankle DF 4/5 5/5     Ankle PF 4/5 NWB  4+/5     Ankle Inversion 4-/5 5/5     Ankle Eversion 4-/5 5/5     Big Toe Extension 3-/5 5/5  tested in neutral   Toe flexion 4/5 5/5 Tested in neutral position       Joint Mobility:   Ankle: normal subtalar, mild talocrural restriction     Flexibility: normal calf flexibility     Gait Analysis:Without AD Assistance ind Deviations: fair heel strike in boot, mild antalgic gait      Balance deferred due to weightbearing status     CMS Impairment/Limitation/Restriction for FOTO ankle Survey     Therapist reviewed FOTO scores for Judy Foley on 11/11/2024.   FOTO documents entered into [employer] - see Media section.     Limitation Score: 51%  Category: Mobility     Treatment     Judy received the treatments listed below:      therapeutic exercises to develop strength, endurance, ROM, flexibility, posture, and core stabilization for 32 minutes including:  Seated heel raise 2in deficit 2x10  Seated heel slides 20x full rom to tolerance   Toe extension isometrics 10x10"   Objective measures    Not today  Nu step 6' lvl 3  SLR 2 x 10   Side-lying abd 2 x 10   LAQ 2 x 10 red band   HS Curl 2 x 10 red band   Hold toe in extension. Have patient actively hold there while you remove your hand. Hold for 5 seconds. Repeat 5-10 times. (Place and Hold) next  Toe flexion (10 deg) with foot held in dorsiflexion x20       manual therapy techniques: Joint mobilizations, Myofacial release, and Soft tissue Mobilization were applied to the: R foot for 15 minutes, including:  Passive dorsiflexion and passive toe " "extension  Passive dip flexion   2nd toe flexion stretch    neuromuscular re-education activities to improve: Balance, Coordination, Kinesthetic, Sense, Proprioception, and Posture for 8 minutes. The following activities were included:  Arch lifts 20x5" NP  Towel scrunches 2'  Toe flexion over edge of oval (1st MTP supported) 20x    Patient Education and Home Exercises       Education provided:   Anatomy and Pathology.  Symptom management and plan of care progressions.  Home Exercise Program.    Written Home Exercises Provided: Pt instructed to continue prior HEP. Exercises were reviewed and Judy was able to demonstrate them prior to the end of the session.  Judy demonstrated good  understanding of the education provided. See Electronic Medical Record under Patient Instructions for exercises provided during therapy sessions    Assessment     Judy is a 58 y.o. female referred to outpatient Physical Therapy with a medical diagnosis of Laceration of extensor tendon of right foot, initial encounter, Disorder of tendon repair. Pt presents 14 weeks 3 days (8/2/24) s/p right EHL repair. Hold on PT secondary to wound infection and dehiscence requiring 2 months of wound care. Last seen at 7 weeks post-op.  Wound now nearly healed and patient continues to monitor closely. Patient presents in short boot WBAT with mild antalgic gait. Not using any AD at home. Patient with improving ankle range of motion. Patient with greatest restriction of first toe mobility and ankle/foot strength deficits. Implemented gentle passive range of motion and submaximal isometrics today. Awaiting further clearance from MD for when to transition out of boot. Cleared for range of motion and "some light therapy/extensor hallucis longus tendon strengthening"  She will benefit from skilled PT to address the above deficits.     Judy Is progressing well towards her goals.   Patient prognosis is Good.     Patient will continue to benefit from skilled " outpatient physical therapy to address the deficits listed in the problem list box on initial evaluation, provide pt/family education and to maximize pt's level of independence in the home and community environment.     Patient's spiritual, cultural and educational needs considered and pt agreeable to plan of care and goals.     Anticipated barriers to physical therapy: co-morbidities     Goals:   Short Term Goals (6 Weeks):   1. Patient will be independent with home exercise program to supplement therapy in improving functional mobility. Progressing, not met  2. Patient will improve dorsiflexion active range of motion with knee extended to 8 degrees to improve gait. Progressing, not met  3. Patient will improve passive toe extension to 60. Progressing, not met  4. Patient will walk 300 ft in tennis shoe without gait impairment. Progressing, not met     Long Term Goals (12 Weeks):   1. Patient will improve FOTO score to </= 41% limited to decrease perceived limitation with mobility. Progressing, not met  2. Patient will improve impaired lower extremity strength to >/= 4+/5 to improve strength for functional tasks.Progressing, not met  3. Patient will improve active toe extension to 50 or greater . Progressing, not met  4. Patient will improve single leg balance duration to 30 sec to improve stability. Progressing, not met  5. Pt will walk on unlevel surfaces without AD or gait deficits to promote community mobility. Progressing, not met    Plan     Plan of care Certification: 11/11/24-12/27/24 2x/week 6 weeks    Jerrica Lentz, PT, DPT

## 2024-11-12 NOTE — PLAN OF CARE
[facility]   Physical Therapy plan of care      Name: Judy Foley  [facility] Number: [id number]    Therapy Diagnosis:   Encounter Diagnosis   Name Primary?    Difficulty walking involving foot Yes     Physician: Kurt Gomez DPM    Visit Date: 11/11/2024    Physician Orders: PT Eval and Treat   Medical Diagnosis from Referral:   S96.821A (ICD-10-CM) - Laceration of extensor tendon of right foot, initial encounter   M96.89 (ICD-10-CM) - Disorder of tendon repair      Evaluation Date: 9/16/2024  Authorization Period Expiration: 12/31/24  Plan of Care Expiration: 12/27/24  Visit # / Visits authorized: 3 20  FOTO: 4/10  PTA Visit #: 1/5      Time In: 2:05  Time Out: 3:00  Total Billable Time: 55 minutes (1MT, Therapeutic Exercise - 2, 1 NM)     Precautions: Standard, migraine, anxiety, WBAT in boot; avoid excess plantarflexion with toe flexion       Subjective     Patient reports: she had a wound dehiscence with infection. She went to wound care and had compression therapy until last Monday, released now and cleared to begin PT. Tiny part remains incompletely healed. She continues to closely monitor and changes bandages appropriately. Foot is sore, tender on the top. No follow ups scheduled.  Pain with standing and walking. Improves with rest. Takes boot off to sleep. Wears compression sock.   She was compliant with home exercise program.  Response to previous treatment: mild sore  Functional change: Ongoing    Pain: 3/10  Location: right dorsum of foot     Objective      Posture: 1st toe neutral alignment, 2nd toe slight ext  Palpation: nikos-incisional tenderness to dorsum of foot along first ray. Decreased nikos-incisional mobility, Mild tenderness 2nd plantar MTP joint   Sensation: normal light touch     No drainage, redness, warmth, swelling on bandage.     Range of Motion/Strength:      Ankle Right Left Pain/Dysfunction with Movement   AROM/PROM         dorsiflexion  10  10      plantarflexion  33(gentle active to tolerance)  53     inversion  30  45     eversion  10  28     Toe extension 40* (gentle passive) 60     Toe flexion  3* 20 History of bilateral bunion sx       L/E MMT Right Left Pain/Dysfunction with Movement   Hip Flexion 5/5 5/5     Hip Extension 4+/5 4+/5     Hip Abduction 4+/5 4+/5     Knee Flexion 4+/5 4+/5     Knee Extension 5/5 5/5     Ankle DF 4/5 5/5     Ankle PF 4/5 NWB  4+/5     Ankle Inversion 4-/5 5/5     Ankle Eversion 4-/5 5/5     Big Toe Extension 3-/5 5/5  tested in neutral   Toe flexion 4/5 5/5 Tested in neutral position       Joint Mobility:   Ankle: normal subtalar, mild talocrural restriction     Flexibility: normal calf flexibility     Gait Analysis:Without AD Assistance ind Deviations: fair heel strike in boot, mild antalgic gait      Balance deferred due to weightbearing status     CMS Impairment/Limitation/Restriction for FOTO ankle Survey     Therapist reviewed FOTO scores for Judy Foley on 11/11/2024.   FOTO documents entered into [employer] - see Media section.     Limitation Score: 51%  Category: Mobility     Treatment     Judy received the treatments listed in daily note      Patient Education and Home Exercises       Education provided:   Anatomy and Pathology.  Symptom management and plan of care progressions.  Home Exercise Program.    Written Home Exercises Provided: Pt instructed to continue prior HEP. Exercises were reviewed and Judy was able to demonstrate them prior to the end of the session.  Judy demonstrated good  understanding of the education provided. See Electronic Medical Record under Patient Instructions for exercises provided during therapy sessions    Assessment     Judy is a 58 y.o. female referred to outpatient Physical Therapy with a medical diagnosis of Laceration of extensor tendon of right foot, initial encounter, Disorder of tendon repair. Pt presents 14 weeks 3 days (8/2/24) s/p right EHL repair. Hold on PT secondary to wound  "infection and dehiscence requiring 2 months of wound care. Last seen at 7 weeks post-op.  Wound now nearly healed and patient continues to monitor closely. Patient presents in short boot WBAT with mild antalgic gait. Not using any AD at home. Patient with improving ankle range of motion. Patient with greatest restriction of first toe mobility and ankle/foot strength deficits. Implemented gentle passive range of motion and submaximal isometrics today. Awaiting further clearance from MD for when to transition out of boot. Cleared for range of motion and "some light therapy/extensor hallucis longus tendon strengthening"  She will benefit from skilled PT to address the above deficits.     Judy Is progressing well towards her goals.   Patient prognosis is Good.     Patient will continue to benefit from skilled outpatient physical therapy to address the deficits listed in the problem list box on initial evaluation, provide pt/family education and to maximize pt's level of independence in the home and community environment.     Patient's spiritual, cultural and educational needs considered and pt agreeable to plan of care and goals.     Anticipated barriers to physical therapy: co-morbidities     Goals:   Short Term Goals (6 Weeks):   1. Patient will be independent with home exercise program to supplement therapy in improving functional mobility. Progressing, not met  2. Patient will improve dorsiflexion active range of motion with knee extended to 8 degrees to improve gait. Progressing, not met  3. Patient will improve passive toe extension to 60. Progressing, not met  4. Patient will walk 300 ft in tennis shoe without gait impairment. Progressing, not met     Long Term Goals (12 Weeks):   1. Patient will improve FOTO score to </= 41% limited to decrease perceived limitation with mobility. Progressing, not met  2. Patient will improve impaired lower extremity strength to >/= 4+/5 to improve strength for functional " tasks.Progressing, not met  3. Patient will improve active toe extension to 50 or greater . Progressing, not met  4. Patient will improve single leg balance duration to 30 sec to improve stability. Progressing, not met  5. Pt will walk on unlevel surfaces without AD or gait deficits to promote community mobility. Progressing, not met    Plan     Plan of care Certification: 11/11/24-12/27/24 2x/week 6 weeks    Jerrica Lentz, PT, DPT

## 2024-11-14 DIAGNOSIS — S96.821A LACERATION OF EXTENSOR TENDON OF RIGHT FOOT, INITIAL ENCOUNTER: ICD-10-CM

## 2024-11-14 RX ORDER — OXYCODONE AND ACETAMINOPHEN 10; 325 MG/1; MG/1
1 TABLET ORAL EVERY 6 HOURS PRN
Qty: 27 TABLET | Refills: 0 | Status: SHIPPED | OUTPATIENT
Start: 2024-11-14

## 2024-11-18 ENCOUNTER — HOSPITAL ENCOUNTER (OUTPATIENT)
Dept: WOUND CARE | Facility: HOSPITAL | Age: 59
Discharge: HOME | End: 2024-11-18
Attending: PREVENTIVE MEDICINE
Payer: COMMERCIAL

## 2024-11-18 ENCOUNTER — CLINICAL SUPPORT (OUTPATIENT)
Dept: REHABILITATION | Facility: HOSPITAL | Age: 59
End: 2024-11-18
Payer: COMMERCIAL

## 2024-11-18 ENCOUNTER — PATIENT MESSAGE (OUTPATIENT)
Dept: PODIATRY | Facility: CLINIC | Age: 59
End: 2024-11-18
Payer: COMMERCIAL

## 2024-11-18 VITALS
WEIGHT: 160.06 LBS | TEMPERATURE: 98 F | SYSTOLIC BLOOD PRESSURE: 124 MMHG | BODY MASS INDEX: 28.36 KG/M2 | HEIGHT: 63 IN | DIASTOLIC BLOOD PRESSURE: 61 MMHG | HEART RATE: 74 BPM

## 2024-11-18 DIAGNOSIS — R29.898 DECREASED STRENGTH OF LOWER EXTREMITY: Primary | ICD-10-CM

## 2024-11-18 DIAGNOSIS — M25.674 DECREASED ROM OF RIGHT TOE: ICD-10-CM

## 2024-11-18 DIAGNOSIS — R26.2 DIFFICULTY WALKING INVOLVING FOOT: ICD-10-CM

## 2024-11-18 DIAGNOSIS — L97.512 ULCER OF RIGHT FOOT WITH FAT LAYER EXPOSED: Primary | ICD-10-CM

## 2024-11-18 DIAGNOSIS — S96.821S: ICD-10-CM

## 2024-11-18 DIAGNOSIS — T81.30XA WOUND DEHISCENCE: ICD-10-CM

## 2024-11-18 PROCEDURE — 97112 NEUROMUSCULAR REEDUCATION: CPT | Mod: PN,CQ

## 2024-11-18 PROCEDURE — 97597 DBRDMT OPN WND 1ST 20 CM/<: CPT

## 2024-11-18 PROCEDURE — 97110 THERAPEUTIC EXERCISES: CPT | Mod: PN,CQ

## 2024-11-18 PROCEDURE — 97140 MANUAL THERAPY 1/> REGIONS: CPT | Mod: PN,CQ

## 2024-11-18 NOTE — PROGRESS NOTES
[facility]   Physical Therapy Treatment Note / plan of care      Name: Judy Foley  [facility] Number: [id number]    Therapy Diagnosis:   Encounter Diagnoses   Name Primary?    Decreased strength of lower extremity Yes    Decreased ROM of right toe     Difficulty walking involving foot      Physician: Kurt Gomez DPM    Visit Date: 11/18/2024    Physician Orders: PT Eval and Treat   Medical Diagnosis from Referral:   S96.821A (ICD-10-CM) - Laceration of extensor tendon of right foot, initial encounter   M96.89 (ICD-10-CM) - Disorder of tendon repair      Evaluation Date: 9/16/2024  Authorization Period Expiration: 12/31/24  Plan of Care Expiration: 12/27/24  Visit # / Visits authorized: 4/ 20  FOTO: 5/10  PTA Visit #: 1/5      Time In: 10:15  Time Out: 10:53  Total Billable Time: 43 minutes (1MT, Therapeutic Exercise - 1, 2 NM)     Precautions: Standard, migraine, anxiety, WBAT in boot; avoid excess plantarflexion with toe flexion       Subjective     Patient reports: Goes to wound care later today. Foot is sore, tender on the top. Takes boot off to sleep. Wears compression sock. Plans to reach out to MD to clarify if she is ok to wean out of boot yet.   She was compliant with home exercise program.  Response to previous treatment: mild sore  Functional change: Ongoing    Pain: 3/10  Location: right dorsum of foot     Objective      Posture: 1st toe neutral alignment, 2nd toe slight ext  Palpation: nikos-incisional tenderness to dorsum of foot along first ray. Decreased nikos-incisional mobility, Mild tenderness 2nd plantar MTP joint   Sensation: normal light touch     No drainage, redness, warmth, swelling on bandage.     Range of Motion/Strength:      Ankle Right Left Pain/Dysfunction with Movement   AROM/PROM         dorsiflexion  10  10     plantarflexion  33(gentle active to tolerance)  53     inversion  30  45     eversion  10  28     Toe extension 40* (gentle passive) 60     Toe  flexion  3* 20 History of bilateral bunion sx       L/E MMT Right Left Pain/Dysfunction with Movement   Hip Flexion 5/5 5/5     Hip Extension 4+/5 4+/5     Hip Abduction 4+/5 4+/5     Knee Flexion 4+/5 4+/5     Knee Extension 5/5 5/5     Ankle DF 4/5 5/5     Ankle PF 4/5 NWB  4+/5     Ankle Inversion 4-/5 5/5     Ankle Eversion 4-/5 5/5     Big Toe Extension 3-/5 5/5  tested in neutral   Toe flexion 4/5 5/5 Tested in neutral position       Joint Mobility:   Ankle: normal subtalar, mild talocrural restriction     Flexibility: normal calf flexibility     Gait Analysis:Without AD Assistance ind Deviations: fair heel strike in boot, mild antalgic gait      Balance deferred due to weightbearing status     CMS Impairment/Limitation/Restriction for FOTO ankle Survey     Therapist reviewed FOTO scores for Judy Foley on 11/11/2024.   FOTO documents entered into [facility] - see Media section.     Limitation Score: 51%  Category: Mobility     Treatment     Judy received the treatments listed below:      therapeutic exercises to develop strength, endurance, ROM, flexibility, posture, and core stabilization for 10 minutes including:  Seated heel slides 20x full rom to tolerance   SLR 2 x 10   Side-lying abd 2 x 10     Not today  Nu step 6' lvl 3  LAQ 2 x 10 red band   HS Curl 2 x 10 red band   Hold toe in extension. Have patient actively hold there while you remove your hand. Hold for 5 seconds. Repeat 5-10 times. (Place and Hold) next  Toe flexion (10 deg) with foot held in dorsiflexion x20       manual therapy techniques: Joint mobilizations, Myofacial release, and Soft tissue Mobilization were applied to the: R foot for 10 minutes, including:  Passive dorsiflexion and passive toe extension  Passive dip flexion   2nd toe flexion stretch    neuromuscular re-education activities to improve: Balance, Coordination, Kinesthetic, Sense, Proprioception, and Posture for 23 minutes. The following activities were included:  Seated heel raise  "2in deficit 2x10 (2 rounds)  Arch lifts 20x5"   Towel scrunches 2'  Toe extension isometrics 10x10"   Toe flexion over edge of oval (1st MTP supported) 20x    Patient Education and Home Exercises       Education provided:   Anatomy and Pathology.  Symptom management and plan of care progressions.  Home Exercise Program.    Written Home Exercises Provided: Pt instructed to continue prior HEP. Exercises were reviewed and Judy was able to demonstrate them prior to the end of the session.  Judy demonstrated good  understanding of the education provided. See Electronic Medical Record under Patient Instructions for exercises provided during therapy sessions    Assessment     Judy is a 58 y.o. female referred to outpatient Physical Therapy with a medical diagnosis of Laceration of extensor tendon of right foot, initial encounter, Disorder of tendon repair. Pt presents 15 weeks 3 days (8/2/24) s/p right EHL repair. Hold on PT secondary to wound infection and dehiscence requiring 2 months of wound care. Continues to see wound care. Patient presents in short boot WBAT with mild antalgic gait. Not using any AD at home. Session continues to focus on gentle passive range of motion and submaximal isometrics. Awaiting further clearance from MD for when to transition out of boot. Cleared for range of motion and "some light therapy/extensor hallucis longus tendon strengthening"  She will benefit from skilled PT to address the above deficits.     Judy Is progressing well towards her goals.   Patient prognosis is Good.     Patient will continue to benefit from skilled outpatient physical therapy to address the deficits listed in the problem list box on initial evaluation, provide pt/family education and to maximize pt's level of independence in the home and community environment.     Patient's spiritual, cultural and educational needs considered and pt agreeable to plan of care and goals.     Anticipated barriers to physical therapy: " co-morbidities     Goals:   Short Term Goals (6 Weeks):   1. Patient will be independent with home exercise program to supplement therapy in improving functional mobility. Progressing, not met  2. Patient will improve dorsiflexion active range of motion with knee extended to 8 degrees to improve gait. Progressing, not met  3. Patient will improve passive toe extension to 60. Progressing, not met  4. Patient will walk 300 ft in tennis shoe without gait impairment. Progressing, not met     Long Term Goals (12 Weeks):   1. Patient will improve FOTO score to </= 41% limited to decrease perceived limitation with mobility. Progressing, not met  2. Patient will improve impaired lower extremity strength to >/= 4+/5 to improve strength for functional tasks.Progressing, not met  3. Patient will improve active toe extension to 50 or greater . Progressing, not met  4. Patient will improve single leg balance duration to 30 sec to improve stability. Progressing, not met  5. Pt will walk on unlevel surfaces without AD or gait deficits to promote community mobility. Progressing, not met    Plan     Plan of care Certification: 11/11/24-12/27/24 2x/week 6 weeks    Loren Jones, PTA

## 2024-11-18 NOTE — PROCEDURES
"Debridement    Date/Time: 11/18/2024 2:00 PM    Performed by: Sonu Whittaker MD  Authorized by: Sonu Whittaker MD    Time out: Immediately prior to procedure a "time out" was called to verify the correct patient, procedure, equipment, support staff and site/side marked as required.    Consent Done?:  Yes (Written)  Local anesthesia used?: Yes    Local anesthetic:  Topical anesthetic    Wound Details:    Location:  Right foot    Location:  Right Dorsal Foot    Type of Debridement:  Excisional       Length (cm):  0.6       Width (cm):  1.3       Depth (cm):  0.1       Area (sq cm):  0.78       Percent Debrided (%):  100       Total Area Debrided (sq cm):  0.78    Depth of debridement:  Epidermis/Dermis    Devitalized tissue debrided:  Slough, Fibrin and Exudate    Instruments:  Blade  Bleeding:  Minimal  Hemostasis Achieved: Yes  Method Used:  Pressure  Patient tolerance:  Patient tolerated the procedure well with no immediate complications    "

## 2024-11-18 NOTE — PROGRESS NOTES
Wound Care & Hyperbaric Medicine    Subjective:       Patient ID: Judy Foley is a 59 y.o. female.    Chief Complaint: Non-healing Wound Follow Up    Follow up right foot wound. No complaints tolerating cam boot. No acute complaints today.    Review of Systems      Objective:     Vitals:    11/18/24 1404   BP: 124/61   Pulse: 74   Temp: 98 °F (36.7 °C)         Physical Exam       Wound 10/07/24 1318 Other (comment) Right dorsal Foot (Active)   10/07/24 1318 Foot   Present on Original Admission: Y   Primary Wound Type: Other   Side: Right   Orientation: dorsal   Wound Approximate Age at First Assessment (Weeks):    Wound Number:    Is this injury device related?:    Incision Type:    Closure Method:    Wound Description (Comments):    Type:    Additional Comments:    Ankle-Brachial Index:    Pulses: +2   Removal Indication and Assessment:    Wound Outcome:    Wound Image   11/18/24 1416   Dressing Appearance Intact;Dried drainage 11/18/24 1416   Drainage Amount Scant 11/18/24 1416   Drainage Characteristics/Odor Serosanguineous 11/18/24 1416   Appearance Dry;Fibrin 11/18/24 1416   Tissue loss description Full thickness 11/18/24 1416   Yellow (%), Wound Tissue Color 100 % 11/18/24 1416   Periwound Area Intact;Dry;Scar tissue 11/18/24 1416   Wound Edges Defined 11/18/24 1416   Wound Length (cm) 0.6 cm 11/18/24 1416   Wound Width (cm) 1.3 cm 11/18/24 1416   Wound Depth (cm) 0.1 cm 11/18/24 1416   Wound Volume (cm^3) 0.078 cm^3 11/18/24 1416   Wound Surface Area (cm^2) 0.78 cm^2 11/18/24 1416   Care Cleansed with:;Soap and water;Antimicrobial agent;Debrided 11/18/24 1416   Dressing Applied;Silver;Calcium alginate;Island/border;Tubular bandage 11/18/24 1416   Periwound Care Moisturizer applied;Absorptive dressing applied 11/18/24 1416   Compression Two layer compression 11/18/24 1416   Off Loading Other (see comments) 11/18/24 1416   Dressing Change Due 11/25/24 11/18/24 1416         Assessment/Plan:          ICD-10-CM ICD-9-CM   1. Ulcer of right foot with fat layer exposed  L97.512 707.15   2. Laceration of extensor tendon of right foot, sequela  S96.821S 906.1   3. Wound dehiscence  T81.30XA 998.30       Wound is almost fully resolved. Continue to monitor and protect    Tissue pathology and/or culture taken:  [] Yes [x] No   Sharp debridement performed:   [x] Yes [] No   Labs ordered this visit:   [] Yes [x] No   Imaging ordered this visit:   [] Yes [x] No           Orders Placed This Encounter   Procedures    Change dressing     Right dorsal foot wound:   Cleanse wound with:Vashe   Lidocaine:prn   Silver nitrate:prn   Periwound care:lotion to leg   Primary dressing:Silver Alginate   Secondary dressing:Island Border   Offloading:Short Cam Walking Boot  Edema control: Tubi  F toes to knee.   Frequency:Weekly   Follow-up: If symptoms fail to improve. Discharged from wound care.        Follow up if symptoms worsen or fail to improve.            This includes face to face time and non-face to face time preparing to see the patient (eg, review of tests), obtaining and/or reviewing separately obtained history, documenting clinical information in the electronic or other health record, independently interpreting results and communicating results to the patient/family/caregiver, or care coordinator.

## 2024-11-20 ENCOUNTER — CLINICAL SUPPORT (OUTPATIENT)
Dept: REHABILITATION | Facility: HOSPITAL | Age: 59
End: 2024-11-20
Payer: COMMERCIAL

## 2024-11-20 DIAGNOSIS — R26.2 DIFFICULTY WALKING INVOLVING FOOT: ICD-10-CM

## 2024-11-20 DIAGNOSIS — R29.898 DECREASED STRENGTH OF LOWER EXTREMITY: Primary | ICD-10-CM

## 2024-11-20 DIAGNOSIS — M25.674 DECREASED ROM OF RIGHT TOE: ICD-10-CM

## 2024-11-20 PROCEDURE — 97112 NEUROMUSCULAR REEDUCATION: CPT | Mod: PN,CQ

## 2024-11-20 PROCEDURE — 97110 THERAPEUTIC EXERCISES: CPT | Mod: PN,CQ

## 2024-11-20 PROCEDURE — 97140 MANUAL THERAPY 1/> REGIONS: CPT | Mod: PN,CQ

## 2024-11-20 PROCEDURE — 97530 THERAPEUTIC ACTIVITIES: CPT | Mod: PN,CQ

## 2024-11-20 NOTE — PROGRESS NOTES
"[facility] OUTPATIENT THERAPY AND WELLNESS   Physical Therapy Treatment Note      Name: uJdy West  Clinic Number: [id number]    Therapy Diagnosis:   Encounter Diagnoses   Name Primary?    Decreased strength of lower extremity Yes    Decreased ROM of right toe     Difficulty walking involving foot      Physician: Kurt Gomez DPM    Visit Date: 11/20/2024    Physician Orders: PT Eval and Treat   Medical Diagnosis from Referral:   S96.821A (ICD-10-CM) - Laceration of extensor tendon of right foot, initial encounter   M96.89 (ICD-10-CM) - Disorder of tendon repair      Evaluation Date: 9/16/2024  Authorization Period Expiration: 12/31/24  Plan of Care Expiration: 12/27/24  Visit # / Visits authorized: 5/ 20  FOTO: 6/10  PTA Visit #: 2/5      Time In: 3:05  Time Out: 4:00  Total Billable Time: 55 minutes (1MT, 1TE, 2 NM, 1TA)     Precautions: Standard, migraine, anxiety, WBAT in boot; avoid excess plantarflexion with toe flexion       Subjective     Patient reports: Was told but doctors office that she can begin "light weightbearing" out of boot.   She was compliant with home exercise program.  Response to previous treatment: mild sore  Functional change: Ongoing    Pain: 3/10  Location: right dorsum of foot     Objective      Posture: 1st toe neutral alignment, 2nd toe slight ext  Palpation: nikos-incisional tenderness to dorsum of foot along first ray. Decreased nikos-incisional mobility, Mild tenderness 2nd plantar MTP joint   Sensation: normal light touch     No drainage, redness, warmth, swelling on bandage.     Range of Motion/Strength:      Ankle Right Left Pain/Dysfunction with Movement   AROM/PROM         dorsiflexion  10  10     plantarflexion  33(gentle active to tolerance)  53     inversion  30  45     eversion  10  28     Toe extension 40* (gentle passive) 60     Toe flexion  3* 20 History of bilateral bunion sx       L/E MMT Right Left Pain/Dysfunction with Movement   Hip Flexion 5/5 5/5     Hip Extension " "4+/5 4+/5     Hip Abduction 4+/5 4+/5     Knee Flexion 4+/5 4+/5     Knee Extension 5/5 5/5     Ankle DF 4/5 5/5     Ankle PF 4/5 NWB  4+/5     Ankle Inversion 4-/5 5/5     Ankle Eversion 4-/5 5/5     Big Toe Extension 3-/5 5/5  tested in neutral   Toe flexion 4/5 5/5 Tested in neutral position       Joint Mobility:   Ankle: normal subtalar, mild talocrural restriction     Flexibility: normal calf flexibility     Gait Analysis:Without AD Assistance ind Deviations: fair heel strike in boot, mild antalgic gait      Balance deferred due to weightbearing status     CMS Impairment/Limitation/Restriction for FOTO ankle Survey     Therapist reviewed FOTO scores for Judy Foley on 11/11/2024.   FOTO documents entered into [employer] - see Media section.     Limitation Score: 51%  Category: Mobility     Treatment     Judy received the treatments listed below:      therapeutic exercises to develop strength, endurance, ROM, flexibility, posture, and core stabilization for 12 minutes including:  Seated heel slides 20x full rom to tolerance   SLR 2 x 10   Side-lying abd 2 x 10   Hold toe in extension. Have patient actively hold there while you remove your hand. Hold for 5 seconds. Repeat 5-10 times. (Place and Hold) next    Not today  Nu step 6' lvl 3  LAQ 2 x 10 red band   HS Curl 2 x 10 red band   Toe flexion (10 deg) with foot held in dorsiflexion x20       manual therapy techniques: Joint mobilizations, Myofacial release, and Soft tissue Mobilization were applied to the: R foot for 10 minutes, including:  Passive dorsiflexion and passive toe extension  Passive dip flexion   2nd toe flexion stretch    neuromuscular re-education activities to improve: Balance, Coordination, Kinesthetic, Sense, Proprioception, and Posture for 23 minutes. The following activities were included:  Seated heel raise 2in deficit 2x10 (2 rounds)  Arch lifts 20x5"   Towel scrunches 2'  Toe extension isometrics 10x10"   Toe flexion over edge of oval (1st MTP " "supported) 20x    therapeutic activities to improve functional performance for 10 minutes, including:  Suleman shifting forward/lateral 2 x 1' ea  Lunge on stool 2 x 10       Patient Education and Home Exercises       Education provided:   Anatomy and Pathology.  Symptom management and plan of care progressions.  Home Exercise Program.    Written Home Exercises Provided: Pt instructed to continue prior HEP. Exercises were reviewed and Judy was able to demonstrate them prior to the end of the session.  Judy demonstrated good  understanding of the education provided. See Electronic Medical Record under Patient Instructions for exercises provided during therapy sessions    Assessment     Judy is a 58 y.o. female referred to outpatient Physical Therapy with a medical diagnosis of Laceration of extensor tendon of right foot, initial encounter, Disorder of tendon repair. Pt presents 15 weeks 3 days (8/2/24) s/p right EHL repair. Hold on PT secondary to wound infection and dehiscence requiring 2 months of wound care. Recently released from wound care. Patient presents in short boot WBAT with mild antalgic gait. Not using any AD at home. Session continues to focus on gentle passive range of motion and submaximal isometrics. Patient cleared for "light weightbearing" out of boot. Trial of weight shifting out of boot with no pain mild stiffness reported. She will benefit from skilled PT to address the above deficits.     Judy Is progressing well towards her goals.   Patient prognosis is Good.     Patient will continue to benefit from skilled outpatient physical therapy to address the deficits listed in the problem list box on initial evaluation, provide pt/family education and to maximize pt's level of independence in the home and community environment.     Patient's spiritual, cultural and educational needs considered and pt agreeable to plan of care and goals.     Anticipated barriers to physical therapy: co-morbidities "     Goals:   Short Term Goals (6 Weeks):   1. Patient will be independent with home exercise program to supplement therapy in improving functional mobility. Progressing, not met  2. Patient will improve dorsiflexion active range of motion with knee extended to 8 degrees to improve gait. Progressing, not met  3. Patient will improve passive toe extension to 60. Progressing, not met  4. Patient will walk 300 ft in tennis shoe without gait impairment. Progressing, not met     Long Term Goals (12 Weeks):   1. Patient will improve FOTO score to </= 41% limited to decrease perceived limitation with mobility. Progressing, not met  2. Patient will improve impaired lower extremity strength to >/= 4+/5 to improve strength for functional tasks.Progressing, not met  3. Patient will improve active toe extension to 50 or greater . Progressing, not met  4. Patient will improve single leg balance duration to 30 sec to improve stability. Progressing, not met  5. Pt will walk on unlevel surfaces without AD or gait deficits to promote community mobility. Progressing, not met    Plan     Plan of care Certification: 11/11/24-12/27/24 2x/week 6 weeks    Loren Jones, PTA

## 2024-11-25 DIAGNOSIS — S96.821A LACERATION OF EXTENSOR TENDON OF RIGHT FOOT, INITIAL ENCOUNTER: ICD-10-CM

## 2024-11-25 RX ORDER — OXYCODONE AND ACETAMINOPHEN 10; 325 MG/1; MG/1
1 TABLET ORAL EVERY 6 HOURS PRN
Qty: 27 TABLET | Refills: 0 | Status: SHIPPED | OUTPATIENT
Start: 2024-11-25

## 2024-12-01 NOTE — TELEPHONE ENCOUNTER
No care due was identified.  [facility] Embedded Care Due Messages. Reference number: [id number].   12/01/2024 10:29:25 AM CST

## 2024-12-02 ENCOUNTER — DOCUMENTATION ONLY (OUTPATIENT)
Dept: REHABILITATION | Facility: HOSPITAL | Age: 59
End: 2024-12-02
Payer: COMMERCIAL

## 2024-12-02 RX ORDER — BUTALBITAL, ACETAMINOPHEN AND CAFFEINE 50; 325; 40 MG/1; MG/1; MG/1
1 TABLET ORAL EVERY 6 HOURS PRN
Qty: 30 TABLET | Refills: 5 | Status: SHIPPED | OUTPATIENT
Start: 2024-12-02

## 2024-12-02 NOTE — PROGRESS NOTES
Face to face meeting completed with Joann Giang PT regarding current status and progress of   Judy Foley .      Loren Jones, PTA

## 2024-12-04 ENCOUNTER — CLINICAL SUPPORT (OUTPATIENT)
Dept: REHABILITATION | Facility: HOSPITAL | Age: 59
End: 2024-12-04
Payer: COMMERCIAL

## 2024-12-04 DIAGNOSIS — R29.898 DECREASED STRENGTH OF LOWER EXTREMITY: Primary | ICD-10-CM

## 2024-12-04 DIAGNOSIS — M25.674 DECREASED ROM OF RIGHT TOE: ICD-10-CM

## 2024-12-04 DIAGNOSIS — R26.2 DIFFICULTY WALKING INVOLVING FOOT: ICD-10-CM

## 2024-12-04 PROCEDURE — 97110 THERAPEUTIC EXERCISES: CPT | Mod: PN

## 2024-12-04 PROCEDURE — 97112 NEUROMUSCULAR REEDUCATION: CPT | Mod: PN

## 2024-12-04 PROCEDURE — 97530 THERAPEUTIC ACTIVITIES: CPT | Mod: PN

## 2024-12-04 PROCEDURE — 97140 MANUAL THERAPY 1/> REGIONS: CPT | Mod: PN

## 2024-12-04 NOTE — PROGRESS NOTES
[facility]   Physical Therapy Treatment Note      Name: Judy West  Clinic Number: [id number]    Therapy Diagnosis:   Encounter Diagnoses   Name Primary?    Decreased strength of lower extremity Yes    Decreased ROM of right toe     Difficulty walking involving foot      Physician: Kurt Gomez DPM    Visit Date: 12/4/2024    Physician Orders: PT Eval and Treat   Medical Diagnosis from Referral:   S96.821A (ICD-10-CM) - Laceration of extensor tendon of right foot, initial encounter   M96.89 (ICD-10-CM) - Disorder of tendon repair      Evaluation Date: 9/16/2024  Authorization Period Expiration: 12/31/24  Plan of Care Expiration: 12/27/24  Visit # / Visits authorized: 5/ 20  FOTO: 6/10  PTA Visit #: 2/5      Time In: 3:00  Time Out: 4:00  Total Billable Time: 60 minutes (1MT, 1TE, 2 NM, 1TA)     Precautions: Standard, migraine, anxiety, WBAT in boot; avoid excess plantarflexion with toe flexion       Subjective     Patient reports: she is doing a little better. Waking gingerly out of boot around the house. Walking out of boot most of day unless she  is going out. Returns to surgeon next week. Wound is doing better. Monitors closely. Keeps it covered with bandage with bathing. Numbness in great toe.   She was compliant with home exercise program.  Response to previous treatment: mild sore  Functional change: Ongoing    Pain: 3/10  Location: right dorsum of foot     Objective      Posture: 1st toe neutral alignment, 2nd toe slight ext  Palpation: nikos-incisional tenderness to dorsum of foot along first ray. Decreased nikos-incisional mobility, Mild tenderness 2nd plantar MTP joint   Sensation: normal light touch     No drainage, redness, warmth, swelling on bandage.     Range of Motion/Strength:      Ankle Right Left Pain/Dysfunction with Movement   AROM/PROM         dorsiflexion  10  10     plantarflexion  38(gentle active to tolerance)  53     inversion  30  45     eversion  10  28      Toe extension 40* (gentle passive) 60     Toe flexion  3* 20 History of bilateral bunion sx       L/E MMT Right Left Pain/Dysfunction with Movement   Hip Flexion 5/5 5/5     Hip Extension 4+/5 4+/5     Hip Abduction 4+/5 4+/5     Knee Flexion 4+/5 4+/5     Knee Extension 5/5 5/5     Ankle DF 4/5 5/5     Ankle PF 4/5 NWB  4+/5     Ankle Inversion 4-/5 5/5     Ankle Eversion 4-/5 5/5     Big Toe Extension 3-/5 5/5  tested in neutral   Toe flexion 4/5 5/5 Tested in neutral position       Joint Mobility:   Ankle: normal subtalar, mild talocrural restriction     Flexibility: normal calf flexibility     Gait Analysis:Without AD Assistance ind Deviations: fair heel strike in boot, mild antalgic gait      Balance deferred due to weightbearing status     CMS Impairment/Limitation/Restriction for FOTO ankle Survey     Therapist reviewed FOTO scores for Judy Foley on 11/11/2024.   FOTO documents entered into [employer] - see Media section.     Limitation Score: 51%  Category: Mobility     Treatment     Judy received the treatments listed below:      therapeutic exercises to develop strength, endurance, ROM, flexibility, posture, and core stabilization for 17 minutes including:  Seated heel slides 20x full rom to tolerance   SLR 2 x 10 NP  Side-lying abd 2 x 10 NP  Hold toe in extension. Have patient actively hold there while you remove your hand. Hold for 5 seconds. Repeat 5-10 times. (Place and Hold)   Toe extension from edge of foam 20x    Not today  Nu step 6' lvl 3  LAQ 2 x 10 red band   HS Curl 2 x 10 red band   Toe flexion (10 deg) with foot held in dorsiflexion x20       manual therapy techniques: Joint mobilizations, Myofacial release, and Soft tissue Mobilization were applied to the: R foot for 10 minutes, including:  Passive dorsiflexion and passive toe extension  Passive dip flexion   2nd toe flexion stretch    neuromuscular re-education activities to improve: Balance, Coordination, Kinesthetic, Sense, Proprioception,  "and Posture for 23 minutes. The following activities were included:  Seated heel raise 2in deficit 2x10 (2 rounds)  Arch lifts 20x5"   Towel scrunches 2'  Toe extension isometrics 10x10"   Toe flexion isometrics 10x10"  Toe flexion over edge of oval (1st MTP supported) 20x  NBOS airex 2x30" EO, 1x EC   Tandem stance airex 2x30"       therapeutic activities to improve functional performance for 10 minutes, including:  Suleman shifting forward/lateral 2 x 1' ea  Lunge on stool 2 x 10   Leg press 3 plates 2x10       Patient Education and Home Exercises       Education provided:   Anatomy and Pathology.  Symptom management and plan of care progressions.  Home Exercise Program.    Written Home Exercises Provided: Pt instructed to continue prior HEP. Exercises were reviewed and Judy was able to demonstrate them prior to the end of the session.  Judy demonstrated good  understanding of the education provided. See Electronic Medical Record under Patient Instructions for exercises provided during therapy sessions    Assessment     Judy is a 58 y.o. female referred to outpatient Physical Therapy with a medical diagnosis of Laceration of extensor tendon of right foot, initial encounter, Disorder of tendon repair. Pt presents 15 weeks 3 days (8/2/24) s/p right EHL repair. Hold on PT secondary to wound infection and dehiscence requiring 2 months of wound care. Recently released from wound care. Patient presents in short boot WBAT with mild antalgic gait. Not using any AD at home. Session continues to focus on gentle passive range of motion and submaximal isometrics. Patient cleared for "light weightbearing" out of boot. Trial of weight shifting out of boot with no pain mild stiffness reported. She will benefit from skilled PT to address the above deficits.     Judy Is progressing well towards her goals.   Patient prognosis is Good.     Patient will continue to benefit from skilled outpatient physical therapy to address the " deficits listed in the problem list box on initial evaluation, provide pt/family education and to maximize pt's level of independence in the home and community environment.     Patient's spiritual, cultural and educational needs considered and pt agreeable to plan of care and goals.     Anticipated barriers to physical therapy: co-morbidities     Goals:   Short Term Goals (6 Weeks):   1. Patient will be independent with home exercise program to supplement therapy in improving functional mobility. Progressing, not met  2. Patient will improve dorsiflexion active range of motion with knee extended to 8 degrees to improve gait. Progressing, not met  3. Patient will improve passive toe extension to 60. Progressing, not met  4. Patient will walk 300 ft in tennis shoe without gait impairment. Progressing, not met     Long Term Goals (12 Weeks):   1. Patient will improve FOTO score to </= 41% limited to decrease perceived limitation with mobility. Progressing, not met  2. Patient will improve impaired lower extremity strength to >/= 4+/5 to improve strength for functional tasks.Progressing, not met  3. Patient will improve active toe extension to 50 or greater . Progressing, not met  4. Patient will improve single leg balance duration to 30 sec to improve stability. Progressing, not met  5. Pt will walk on unlevel surfaces without AD or gait deficits to promote community mobility. Progressing, not met    Plan     Plan of care Certification: 11/11/24-12/27/24 2x/week 6 weeks    Jerrica Lentz, PT, DPT

## 2024-12-06 DIAGNOSIS — S96.821A LACERATION OF EXTENSOR TENDON OF RIGHT FOOT, INITIAL ENCOUNTER: ICD-10-CM

## 2024-12-07 RX ORDER — OXYCODONE AND ACETAMINOPHEN 10; 325 MG/1; MG/1
1 TABLET ORAL EVERY 12 HOURS PRN
Qty: 28 TABLET | Refills: 0 | Status: SHIPPED | OUTPATIENT
Start: 2024-12-07

## 2024-12-10 ENCOUNTER — OFFICE VISIT (OUTPATIENT)
Dept: PODIATRY | Facility: CLINIC | Age: 59
End: 2024-12-10
Payer: COMMERCIAL

## 2024-12-10 VITALS
BODY MASS INDEX: 28.13 KG/M2 | SYSTOLIC BLOOD PRESSURE: 124 MMHG | WEIGHT: 158.75 LBS | HEIGHT: 63 IN | HEART RATE: 74 BPM | DIASTOLIC BLOOD PRESSURE: 61 MMHG

## 2024-12-10 DIAGNOSIS — T81.30XA WOUND DEHISCENCE: ICD-10-CM

## 2024-12-10 DIAGNOSIS — S96.821A LACERATION OF EXTENSOR TENDON OF RIGHT FOOT, INITIAL ENCOUNTER: Primary | ICD-10-CM

## 2024-12-10 DIAGNOSIS — M96.89 DISORDER OF TENDON REPAIR: ICD-10-CM

## 2024-12-10 PROCEDURE — 3078F DIAST BP <80 MM HG: CPT | Mod: CPTII,S$GLB,, | Performed by: PODIATRIST

## 2024-12-10 PROCEDURE — 1159F MED LIST DOCD IN RCRD: CPT | Mod: CPTII,S$GLB,, | Performed by: PODIATRIST

## 2024-12-10 PROCEDURE — 99999 PR PBB SHADOW E&M-EST. PATIENT-LVL III: CPT | Mod: PBBFAC,,, | Performed by: PODIATRIST

## 2024-12-10 PROCEDURE — 99214 OFFICE O/P EST MOD 30 MIN: CPT | Mod: S$GLB,,, | Performed by: PODIATRIST

## 2024-12-10 PROCEDURE — 3044F HG A1C LEVEL LT 7.0%: CPT | Mod: CPTII,S$GLB,, | Performed by: PODIATRIST

## 2024-12-10 PROCEDURE — 3008F BODY MASS INDEX DOCD: CPT | Mod: CPTII,S$GLB,, | Performed by: PODIATRIST

## 2024-12-10 PROCEDURE — 1160F RVW MEDS BY RX/DR IN RCRD: CPT | Mod: CPTII,S$GLB,, | Performed by: PODIATRIST

## 2024-12-10 PROCEDURE — 3074F SYST BP LT 130 MM HG: CPT | Mod: CPTII,S$GLB,, | Performed by: PODIATRIST

## 2024-12-10 NOTE — PROGRESS NOTES
Subjective:      Patient ID: Judy Foley is a 59 y.o. female.    Chief Complaint:   Follow-up and Wound Check (Right medial top of foot )    Judy is a 59 y.o. female who presents to the podiatry clinic  with complaint of  right foot pain.  Four-month follow-up right extensor hallucis longus tendon tear/repair.  Physical therapy is helping.  Had a postoperative dehiscence which has resolved after seeing wound care.  No new complaints.  Overall improving.    Review of Systems   Constitutional: Negative for chills, decreased appetite, fever and malaise/fatigue.   HENT:  Negative for congestion, hearing loss, nosebleeds and tinnitus.    Eyes:  Negative for double vision, pain, photophobia and visual disturbance.   Cardiovascular:  Negative for chest pain, claudication, cyanosis and leg swelling.   Respiratory:  Negative for cough, hemoptysis, shortness of breath and wheezing.    Endocrine: Negative for cold intolerance and heat intolerance.   Hematologic/Lymphatic: Negative for adenopathy and bleeding problem.   Skin:  Negative for color change, dry skin, itching, nail changes and suspicious lesions.   Musculoskeletal:  Positive for joint pain, joint swelling and stiffness. Negative for arthritis.   Gastrointestinal:  Negative for abdominal pain, jaundice, nausea and vomiting.   Genitourinary:  Negative for dysuria, frequency and hematuria.   Neurological:  Negative for difficulty with concentration, loss of balance, numbness, paresthesias and sensory change.   Psychiatric/Behavioral:  Negative for altered mental status, hallucinations and suicidal ideas. The patient is not nervous/anxious.    Allergic/Immunologic: Negative for environmental allergies and persistent infections.           Objective:      Physical Exam  Vitals reviewed.   Constitutional:       Appearance: She is well-developed.   HENT:      Head: Normocephalic and atraumatic.   Cardiovascular:      Pulses:           Dorsalis pedis pulses are 2+ on the  right side and 2+ on the left side.        Posterior tibial pulses are 2+ on the right side and 2+ on the left side.   Pulmonary:      Effort: Pulmonary effort is normal.   Musculoskeletal:         General: Normal range of motion.      Comments: Left extensor hallucis longus tendon noted to be intact at this point.  Can appreciate firing of the tendon/muscle.  Overall improving.  Still significant weakness due to issue with wound dehiscence/scar tissue formation.  Overall improving however.   Skin:     General: Skin is warm and dry.      Capillary Refill: Capillary refill takes 2 to 3 seconds.      Comments: Skin turgor is normal bilaterally.  Skin texture is well hydrated to both lower extremities.  No lesions or rashes or wounds appreciated bilaterally.  Nail plates 1 through 5 bilaterally are within normal limits for length and thickness.  No nail clubbing or incurvation noted.   Neurological:      Mental Status: She is alert and oriented to person, place, and time.      Comments: Sharp dull light touch vibratory proprioceptive sensation are intact bilaterally.  Deep tendon reflexes to patellar and Achilles tendon are symmetrical 2 over 4 bilaterally.  No ankle clonus or Babinski reflexes noted bilaterally.  Coordination is normal to both feet and lower extremities.   Psychiatric:         Behavior: Behavior normal.               Assessment:       Encounter Diagnoses   Name Primary?    Laceration of extensor tendon of right foot, initial encounter Yes    Wound dehiscence     Disorder of tendon repair      Independent visualization of imaging was performed.  Results were reviewed in detail with patient.       Plan:       Judy was seen today for follow-up and wound check.    Diagnoses and all orders for this visit:    Laceration of extensor tendon of right foot, initial encounter    Wound dehiscence    Disorder of tendon repair      I counseled the patient on her conditions, their implications and medical  management.      The nature of the condition, options for management, as well as potential risks and complications were discussed in detail with patient. Patient was amenable to my recommendations and left my office fully informed and will follow up as instructed or sooner if necessary.      Begin transitioning out of walking boot.  Continue physical therapy.  Follow-up in 2 months.

## 2024-12-12 ENCOUNTER — CLINICAL SUPPORT (OUTPATIENT)
Dept: REHABILITATION | Facility: HOSPITAL | Age: 59
End: 2024-12-12
Payer: COMMERCIAL

## 2024-12-12 DIAGNOSIS — M25.674 DECREASED ROM OF RIGHT TOE: ICD-10-CM

## 2024-12-12 DIAGNOSIS — R29.898 DECREASED STRENGTH OF LOWER EXTREMITY: Primary | ICD-10-CM

## 2024-12-12 DIAGNOSIS — R26.2 DIFFICULTY WALKING INVOLVING FOOT: ICD-10-CM

## 2024-12-12 PROCEDURE — 97110 THERAPEUTIC EXERCISES: CPT | Mod: PN,CQ

## 2024-12-12 PROCEDURE — 97530 THERAPEUTIC ACTIVITIES: CPT | Mod: PN,CQ

## 2024-12-12 PROCEDURE — 97140 MANUAL THERAPY 1/> REGIONS: CPT | Mod: PN,CQ

## 2024-12-12 PROCEDURE — 97112 NEUROMUSCULAR REEDUCATION: CPT | Mod: PN,CQ

## 2024-12-12 NOTE — PROGRESS NOTES
[facility]   Physical Therapy Treatment Note      Name: Judy West  Clinic Number: [id number]    Therapy Diagnosis:   Encounter Diagnoses   Name Primary?    Decreased strength of lower extremity Yes    Decreased ROM of right toe     Difficulty walking involving foot      Physician: Kurt Gomez DPM    Visit Date: 12/12/2024    Physician Orders: PT Eval and Treat   Medical Diagnosis from Referral:   S96.821A (ICD-10-CM) - Laceration of extensor tendon of right foot, initial encounter   M96.89 (ICD-10-CM) - Disorder of tendon repair      Evaluation Date: 9/16/2024  Authorization Period Expiration: 12/31/24  Plan of Care Expiration: 12/27/24  Visit # / Visits authorized: 7/ 20  FOTO: 8/10  PTA Visit #: 1/5      Time In: 8:00  Time Out: 8:55  Total Billable Time: 55 minutes (1MT, 1TE, 2 NM, 1TA)     Precautions: Standard, migraine, anxiety, WBAT in boot; avoid excess plantarflexion with toe flexion       Subjective     Patient reports: Trying to transition more out of the boot.  She was compliant with home exercise program.  Response to previous treatment: mild sore  Functional change: Ongoing    Pain: 3/10  Location: right dorsum of foot     Objective      Posture: 1st toe neutral alignment, 2nd toe slight ext  Palpation: nikos-incisional tenderness to dorsum of foot along first ray. Decreased nikos-incisional mobility, Mild tenderness 2nd plantar MTP joint   Sensation: normal light touch     No drainage, redness, warmth, swelling on bandage.     Range of Motion/Strength:      Ankle Right Left Pain/Dysfunction with Movement   AROM/PROM         dorsiflexion  10  10     plantarflexion  38(gentle active to tolerance)  53     inversion  30  45     eversion  10  28     Toe extension 40* (gentle passive) 60     Toe flexion  3* 20 History of bilateral bunion sx       L/E MMT Right Left Pain/Dysfunction with Movement   Hip Flexion 5/5 5/5     Hip Extension 4+/5 4+/5     Hip Abduction 4+/5 4+/5    "  Knee Flexion 4+/5 4+/5     Knee Extension 5/5 5/5     Ankle DF 4/5 5/5     Ankle PF 4/5 NWB  4+/5     Ankle Inversion 4-/5 5/5     Ankle Eversion 4-/5 5/5     Big Toe Extension 3-/5 5/5  tested in neutral   Toe flexion 4/5 5/5 Tested in neutral position       Joint Mobility:   Ankle: normal subtalar, mild talocrural restriction     Flexibility: normal calf flexibility     Gait Analysis:Without AD Assistance ind Deviations: fair heel strike in boot, mild antalgic gait      Balance deferred due to weightbearing status     CMS Impairment/Limitation/Restriction for FOTO ankle Survey     Therapist reviewed FOTO scores for Judy Foley on 11/11/2024.   FOTO documents entered into [employer] - see Media section.     Limitation Score: 51%  Category: Mobility     Treatment     Judy received the treatments listed below:      therapeutic exercises to develop strength, endurance, ROM, flexibility, posture, and core stabilization for 10 minutes including:  Seated heel slides 20x full rom to tolerance   Hold toe in extension. Have patient actively hold there while you remove your hand. Hold for 5 seconds. Repeat 5-10 times. (Place and Hold)   Toe extension from edge of foam 20x    Not today  Nu step 6' lvl 3  LAQ 2 x 10 red band   HS Curl 2 x 10 red band   Toe flexion (10 deg) with foot held in dorsiflexion x20       manual therapy techniques: Joint mobilizations, Myofacial release, and Soft tissue Mobilization were applied to the: R foot for 10 minutes, including:  Passive dorsiflexion and passive toe extension  Passive dip flexion   2nd toe flexion stretch    neuromuscular re-education activities to improve: Balance, Coordination, Kinesthetic, Sense, Proprioception, and Posture for 25 minutes. The following activities were included:  Seated heel raise 2in deficit 2x10 (2 rounds)  Arch lifts 20x5"   Towel scrunches 2'  Toe extension isometrics 10x10"   Toe flexion isometrics 10x10"  Toe flexion over edge of oval (1st MTP supported) " "20x  NBOS airex 2x30" EO, 1x EC   Tandem stance airex 2x30"       therapeutic activities to improve functional performance for 10 minutes, including:  Suleman shifting forward/lateral 2 x 1' ea  Lunge on stool 2 x 10   Leg press 3 plates 2x10   Walking loop x 1       Patient Education and Home Exercises       Education provided:   Anatomy and Pathology.  Symptom management and plan of care progressions.  Home Exercise Program.    Written Home Exercises Provided: Pt instructed to continue prior HEP. Exercises were reviewed and Judy was able to demonstrate them prior to the end of the session.  Judy demonstrated good  understanding of the education provided. See Electronic Medical Record under Patient Instructions for exercises provided during therapy sessions    Assessment     Judy is a 58 y.o. female referred to outpatient Physical Therapy with a medical diagnosis of Laceration of extensor tendon of right foot, initial encounter, Disorder of tendon repair. Pt presents 18 weeks 6 days (8/2/24) s/p right EHL repair. Hold on PT secondary to wound infection and dehiscence requiring 2 months of wound care. Patient presents in slippers without boot or brace, WBAT with mild antalgic gait. Not using any AD at home. Session continues to focus on gentle passive range of motion, submaximal isometrics and normalizing gait. Patient cleared to transition out of boot. She will benefit from skilled PT to address the above deficits.     Judy Is progressing well towards her goals.   Patient prognosis is Good.     Patient will continue to benefit from skilled outpatient physical therapy to address the deficits listed in the problem list box on initial evaluation, provide pt/family education and to maximize pt's level of independence in the home and community environment.     Patient's spiritual, cultural and educational needs considered and pt agreeable to plan of care and goals.     Anticipated barriers to physical therapy: " co-morbidities     Goals:   Short Term Goals (6 Weeks):   1. Patient will be independent with home exercise program to supplement therapy in improving functional mobility. Progressing, not met  2. Patient will improve dorsiflexion active range of motion with knee extended to 8 degrees to improve gait. Progressing, not met  3. Patient will improve passive toe extension to 60. Progressing, not met  4. Patient will walk 300 ft in tennis shoe without gait impairment. Progressing, not met     Long Term Goals (12 Weeks):   1. Patient will improve FOTO score to </= 41% limited to decrease perceived limitation with mobility. Progressing, not met  2. Patient will improve impaired lower extremity strength to >/= 4+/5 to improve strength for functional tasks.Progressing, not met  3. Patient will improve active toe extension to 50 or greater . Progressing, not met  4. Patient will improve single leg balance duration to 30 sec to improve stability. Progressing, not met  5. Pt will walk on unlevel surfaces without AD or gait deficits to promote community mobility. Progressing, not met    Plan     Plan of care Certification: 11/11/24-12/27/24 2x/week 6 weeks    Loren Jones, PTA

## 2024-12-13 ENCOUNTER — PATIENT MESSAGE (OUTPATIENT)
Dept: PODIATRY | Facility: CLINIC | Age: 59
End: 2024-12-13
Payer: COMMERCIAL

## 2024-12-16 ENCOUNTER — CLINICAL SUPPORT (OUTPATIENT)
Dept: REHABILITATION | Facility: HOSPITAL | Age: 59
End: 2024-12-16
Payer: COMMERCIAL

## 2024-12-16 DIAGNOSIS — R29.898 DECREASED STRENGTH OF LOWER EXTREMITY: Primary | ICD-10-CM

## 2024-12-16 DIAGNOSIS — S96.821A LACERATION OF EXTENSOR TENDON OF RIGHT FOOT, INITIAL ENCOUNTER: ICD-10-CM

## 2024-12-16 DIAGNOSIS — R26.2 DIFFICULTY WALKING INVOLVING FOOT: ICD-10-CM

## 2024-12-16 DIAGNOSIS — M25.674 DECREASED ROM OF RIGHT TOE: ICD-10-CM

## 2024-12-16 PROCEDURE — 97112 NEUROMUSCULAR REEDUCATION: CPT | Mod: PN

## 2024-12-16 PROCEDURE — 97530 THERAPEUTIC ACTIVITIES: CPT | Mod: PN

## 2024-12-16 PROCEDURE — 97140 MANUAL THERAPY 1/> REGIONS: CPT | Mod: PN

## 2024-12-16 PROCEDURE — 97110 THERAPEUTIC EXERCISES: CPT | Mod: PN

## 2024-12-16 RX ORDER — OXYCODONE AND ACETAMINOPHEN 10; 325 MG/1; MG/1
1 TABLET ORAL EVERY 12 HOURS PRN
Qty: 28 TABLET | Refills: 0 | Status: SHIPPED | OUTPATIENT
Start: 2024-12-16

## 2024-12-16 NOTE — PROGRESS NOTES
[facility]   Physical Therapy Treatment Note      Name: Judy West  Clinic Number: [id number]    Therapy Diagnosis:   Encounter Diagnoses   Name Primary?    Decreased strength of lower extremity Yes    Decreased ROM of right toe     Difficulty walking involving foot      Physician: Kurt Gomez DPM    Visit Date: 12/16/2024    Physician Orders: PT Eval and Treat   Medical Diagnosis from Referral:   S96.821A (ICD-10-CM) - Laceration of extensor tendon of right foot, initial encounter   M96.89 (ICD-10-CM) - Disorder of tendon repair      Evaluation Date: 9/16/2024  Authorization Period Expiration: 12/31/24  Plan of Care Expiration: 12/27/24  Visit # / Visits authorized: 8/ 20  FOTO: 8/10  PTA Visit #: 1/5      Time In: 4:00  Time Out: 5:00  Total Billable Time: 60 minutes (1MT, 1TE, 2 NM, 1TA)     Precautions: Standard, migraine, anxiety, WBAT in boot; avoid excess plantarflexion with toe flexion       Subjective     Patient reports: transition getting better. Plan to try tennis shoe this week.   She was compliant with home exercise program.  Response to previous treatment: mild sore  Functional change: Ongoing    Pain: 3/10  Location: right dorsum of foot     Objective      Posture: 1st toe neutral alignment, 2nd toe slight ext  Palpation: nikos-incisional tenderness to dorsum of foot along first ray. Decreased nikos-incisional mobility, Mild tenderness 2nd plantar MTP joint   Sensation: normal light touch     No drainage, redness, warmth, swelling on bandage.     Range of Motion/Strength:      Ankle Right Left Pain/Dysfunction with Movement   AROM/PROM         dorsiflexion  10  10     plantarflexion  38(gentle active to tolerance)  53     inversion  30  45     eversion  10  28     Toe extension 40* (gentle passive) 60     Toe flexion  3* 20 History of bilateral bunion sx       L/E MMT Right Left Pain/Dysfunction with Movement   Hip Flexion 5/5 5/5     Hip Extension 4+/5 4+/5     Hip  "Abduction 4+/5 4+/5     Knee Flexion 4+/5 4+/5     Knee Extension 5/5 5/5     Ankle DF 4/5 5/5     Ankle PF 4/5 NWB  4+/5     Ankle Inversion 4-/5 5/5     Ankle Eversion 4-/5 5/5     Big Toe Extension 3-/5 5/5  tested in neutral   Toe flexion 4/5 5/5 Tested in neutral position       Joint Mobility:   Ankle: normal subtalar, mild talocrural restriction     Flexibility: normal calf flexibility     Gait Analysis:Without AD Assistance ind Deviations: fair heel strike in boot, mild antalgic gait      Balance deferred due to weightbearing status     CMS Impairment/Limitation/Restriction for FOTO ankle Survey     Therapist reviewed FOTO scores for Judy Foley on 11/11/2024.   FOTO documents entered into [employer] - see Media section.     Limitation Score: 51%  Category: Mobility     Treatment     Judy received the treatments listed below:      therapeutic exercises to develop strength, endurance, ROM, flexibility, posture, and core stabilization for 10 minutes including:  Seated heel slides 20x full rom to tolerance NP  Toe Extension Place and Hold  patient moves toe into ext, releases and tried to actively hold 5 seconds. 15x   Toe extension from edge of foam 20x  Seated heel raises 2x10    Not today  Nu step 6' lvl 3        manual therapy techniques: Joint mobilizations, Myofacial release, and Soft tissue Mobilization were applied to the: R foot for 10 minutes, including:  Passive dorsiflexion and passive toe extension  Passive dip flexion   2nd toe flexion stretch    neuromuscular re-education activities to improve: Balance, Coordination, Kinesthetic, Sense, Proprioception, and Posture for 30 minutes. The following activities were included:  Seated heel raise 2in deficit 2x10 (2 rounds)  Arch lifts 20x5"   Towel scrunches 2'  Toe extension isometrics 10x10" -trace activation  Toe flexion isometrics 10x10"  Toe flexion over edge of oval (1st MTP supported) 20x  NBOS airex 2x30" EO, 1x EC   Tandem stance airex 2x30"   NMES to " anterior compartment of lower leg x10 mins 12 on 50 off 2 sec ramp 70Hz 400 us   Toe spreads 10x -limited ability to perform     therapeutic activities to improve functional performance for 10 minutes, including:  Suleman shifting forward/lateral 2 x 1' ea  Lunge on stool 2 x 10 NP  Leg press 4 plates 2x10   Walking loop x 1 NP  Dorsiflexion walk 3 laps bars       Patient Education and Home Exercises       Education provided:   Anatomy and Pathology.  Symptom management and plan of care progressions.  Home Exercise Program.    Written Home Exercises Provided: Pt instructed to continue prior HEP. Exercises were reviewed and Judy was able to demonstrate them prior to the end of the session.  Judy demonstrated good  understanding of the education provided. See Electronic Medical Record under Patient Instructions for exercises provided during therapy sessions    Assessment     Judy is a 58 y.o. female referred to outpatient Physical Therapy with a medical diagnosis of Laceration of extensor tendon of right foot, initial encounter, Disorder of tendon repair. Pt presents 19 weeks (8/2/24) s/p right EHL repair. Hold on PT secondary to wound infection and dehiscence requiring 2 months of wound care. Patient presents in slippers without boot or brace, WBAT with mild antalgic gait. Not using any AD at home. Continued to work on activation of extensor hallicus longus. Utilized NMES for improved motor activation. Limited toe flexion at MTP with history of bunion correction. Passive extension range gradually improving. Good ankle range of motion. Patient working towards transition to sneaker. She will benefit from skilled PT to address the above deficits.     Judy Is progressing well towards her goals.   Patient prognosis is Good.     Patient will continue to benefit from skilled outpatient physical therapy to address the deficits listed in the problem list box on initial evaluation, provide pt/family education and to maximize  pt's level of independence in the home and community environment.     Patient's spiritual, cultural and educational needs considered and pt agreeable to plan of care and goals.     Anticipated barriers to physical therapy: co-morbidities     Goals:   Short Term Goals (6 Weeks):   1. Patient will be independent with home exercise program to supplement therapy in improving functional mobility. Progressing, not met  2. Patient will improve dorsiflexion active range of motion with knee extended to 8 degrees to improve gait. Progressing, not met  3. Patient will improve passive toe extension to 60. Progressing, not met  4. Patient will walk 300 ft in tennis shoe without gait impairment. Progressing, not met     Long Term Goals (12 Weeks):   1. Patient will improve FOTO score to </= 41% limited to decrease perceived limitation with mobility. Progressing, not met  2. Patient will improve impaired lower extremity strength to >/= 4+/5 to improve strength for functional tasks.Progressing, not met  3. Patient will improve active toe extension to 50 or greater . Progressing, not met  4. Patient will improve single leg balance duration to 30 sec to improve stability. Progressing, not met  5. Pt will walk on unlevel surfaces without AD or gait deficits to promote community mobility. Progressing, not met    Plan     Plan of care Certification: 11/11/24-12/27/24 2x/week 6 weeks    Jerrica Lentz, PT, DPT

## 2024-12-18 ENCOUNTER — CLINICAL SUPPORT (OUTPATIENT)
Dept: REHABILITATION | Facility: HOSPITAL | Age: 59
End: 2024-12-18
Payer: COMMERCIAL

## 2024-12-18 DIAGNOSIS — M25.674 DECREASED ROM OF RIGHT TOE: ICD-10-CM

## 2024-12-18 DIAGNOSIS — R26.2 DIFFICULTY WALKING INVOLVING FOOT: ICD-10-CM

## 2024-12-18 DIAGNOSIS — R29.898 DECREASED STRENGTH OF LOWER EXTREMITY: Primary | ICD-10-CM

## 2024-12-18 PROCEDURE — 97110 THERAPEUTIC EXERCISES: CPT | Mod: PN,CQ

## 2024-12-18 PROCEDURE — 97140 MANUAL THERAPY 1/> REGIONS: CPT | Mod: PN,CQ

## 2024-12-18 PROCEDURE — 97530 THERAPEUTIC ACTIVITIES: CPT | Mod: PN,CQ

## 2024-12-18 PROCEDURE — 97112 NEUROMUSCULAR REEDUCATION: CPT | Mod: PN,CQ

## 2024-12-18 NOTE — PROGRESS NOTES
"[facility] OUTPATIENT THERAPY AND WELLNESS   Physical Therapy Treatment Note      Name: Judy West  Clinic Number: [id number]    Therapy Diagnosis:   Encounter Diagnoses   Name Primary?    Decreased strength of lower extremity Yes    Decreased ROM of right toe     Difficulty walking involving foot      Physician: Kurt Gomez DPM    Visit Date: 12/18/2024    Physician Orders: PT Eval and Treat   Medical Diagnosis from Referral:   S96.821A (ICD-10-CM) - Laceration of extensor tendon of right foot, initial encounter   M96.89 (ICD-10-CM) - Disorder of tendon repair      Evaluation Date: 9/16/2024  Authorization Period Expiration: 12/31/24  Plan of Care Expiration: 12/27/24  Visit # / Visits authorized: 9/ 20  FOTO: 9/10  PTA Visit #: 1/5      Time In: 11:00  Time Out: 12:00  Total Billable Time: 60 minutes (1MT, 1TE, 2 NM, 1TA)     Precautions: Standard, migraine, anxiety, WBAT in boot; avoid excess plantarflexion with toe flexion       Subjective     Patient reports: Trying sandals today, "feels weird" minimal pain, more instability  She was compliant with home exercise program.  Response to previous treatment: mild sore  Functional change: Ongoing    Pain: 3/10  Location: right dorsum of foot     Objective      Posture: 1st toe neutral alignment, 2nd toe slight ext  Palpation: nikos-incisional tenderness to dorsum of foot along first ray. Decreased nikos-incisional mobility, Mild tenderness 2nd plantar MTP joint   Sensation: normal light touch     No drainage, redness, warmth, swelling on bandage.     Range of Motion/Strength:      Ankle Right Left Pain/Dysfunction with Movement   AROM/PROM         dorsiflexion  10  10     plantarflexion  38(gentle active to tolerance)  53     inversion  30  45     eversion  10  28     Toe extension 40* (gentle passive) 60     Toe flexion  3* 20 History of bilateral bunion sx       L/E MMT Right Left Pain/Dysfunction with Movement   Hip Flexion 5/5 5/5     Hip Extension 4+/5 4+/5   " "  Hip Abduction 4+/5 4+/5     Knee Flexion 4+/5 4+/5     Knee Extension 5/5 5/5     Ankle DF 4/5 5/5     Ankle PF 4/5 NWB  4+/5     Ankle Inversion 4-/5 5/5     Ankle Eversion 4-/5 5/5     Big Toe Extension 3-/5 5/5  tested in neutral   Toe flexion 4/5 5/5 Tested in neutral position       Joint Mobility:   Ankle: normal subtalar, mild talocrural restriction     Flexibility: normal calf flexibility     Gait Analysis:Without AD Assistance ind Deviations: fair heel strike in boot, mild antalgic gait      Balance deferred due to weightbearing status     CMS Impairment/Limitation/Restriction for FOTO ankle Survey     Therapist reviewed FOTO scores for Judy Foley on 11/11/2024.   FOTO documents entered into [employer] - see Media section.     Limitation Score: 51%  Category: Mobility     Treatment     Judy received the treatments listed below:      therapeutic exercises to develop strength, endurance, ROM, flexibility, posture, and core stabilization for 10 minutes including:  Seated heel slides 20x full rom to tolerance   Toe Extension Place and Hold  patient moves toe into ext, releases and tried to actively hold 5 seconds. 15x   Toe extension from edge of foam 20x    Not today  Nu step 6' lvl 3    manual therapy techniques: Joint mobilizations, Myofacial release, and Soft tissue Mobilization were applied to the: R foot for 10 minutes, including:  Passive dorsiflexion and passive toe extension  Passive dip flexion   2nd toe flexion stretch    neuromuscular re-education activities to improve: Balance, Coordination, Kinesthetic, Sense, Proprioception, and Posture for 30 minutes. The following activities were included:  Seated heel raise 2in deficit 2x10 (2 rounds)  Arch lifts 20x5"   Towel scrunches 2'  Toe extension isometrics 10x10" -trace activation  Toe flexion isometrics 10x10"  Toe flexion over edge of oval (1st MTP supported) 20x  NBOS airex 2x30" EO, 1x EC OOT  Tandem stance airex 2x30" OOT  NMES to anterior compartment " of lower leg x15 mins 12 on 50 off 2 sec ramp 70Hz 400 us   Toe spreads 10x -limited ability to perform     therapeutic activities to improve functional performance for 10 minutes, including:  Suleman shifting forward/lateral 2 x 1' ea  Lunge on stool 2 x 10 NP  Leg press 4 plates 2x10   Walking loop x 1 NP  Dorsiflexion walk 3 laps bars       Patient Education and Home Exercises       Education provided:   Anatomy and Pathology.  Symptom management and plan of care progressions.  Home Exercise Program.    Written Home Exercises Provided: Pt instructed to continue prior HEP. Exercises were reviewed and Judy was able to demonstrate them prior to the end of the session.  Judy demonstrated good  understanding of the education provided. See Electronic Medical Record under Patient Instructions for exercises provided during therapy sessions    Assessment     Judy is a 58 y.o. female referred to outpatient Physical Therapy with a medical diagnosis of Laceration of extensor tendon of right foot, initial encounter, Disorder of tendon repair. Pt presents 19 weeks (8/2/24) s/p right EHL repair. Hold on PT secondary to wound infection and dehiscence requiring 2 months of wound care. Patient presents in sandals without boot or brace, WBAT with mild antalgic gait. Not using any AD at home. Continued to work on activation of extensor hallicus longus. Continued NMES to facilitate motor activation. Patient working towards transition to sneaker. She will benefit from skilled PT to address the above deficits.     Judy Is progressing well towards her goals.   Patient prognosis is Good.     Patient will continue to benefit from skilled outpatient physical therapy to address the deficits listed in the problem list box on initial evaluation, provide pt/family education and to maximize pt's level of independence in the home and community environment.     Patient's spiritual, cultural and educational needs considered and pt agreeable to plan  of care and goals.     Anticipated barriers to physical therapy: co-morbidities     Goals:   Short Term Goals (6 Weeks):   1. Patient will be independent with home exercise program to supplement therapy in improving functional mobility. Progressing, not met  2. Patient will improve dorsiflexion active range of motion with knee extended to 8 degrees to improve gait. Progressing, not met  3. Patient will improve passive toe extension to 60. Progressing, not met  4. Patient will walk 300 ft in tennis shoe without gait impairment. Progressing, not met     Long Term Goals (12 Weeks):   1. Patient will improve FOTO score to </= 41% limited to decrease perceived limitation with mobility. Progressing, not met  2. Patient will improve impaired lower extremity strength to >/= 4+/5 to improve strength for functional tasks.Progressing, not met  3. Patient will improve active toe extension to 50 or greater . Progressing, not met  4. Patient will improve single leg balance duration to 30 sec to improve stability. Progressing, not met  5. Pt will walk on unlevel surfaces without AD or gait deficits to promote community mobility. Progressing, not met    Plan     Plan of care Certification: 11/11/24-12/27/24 2x/week 6 weeks    Loren Jones, PTA

## 2024-12-24 ENCOUNTER — DOCUMENTATION ONLY (OUTPATIENT)
Dept: REHABILITATION | Facility: HOSPITAL | Age: 59
End: 2024-12-24
Payer: COMMERCIAL

## 2024-12-24 NOTE — PROGRESS NOTES
Face to face meeting completed with Jerrica Lentz PT regarding current status and progress of   Judy Foley .        Loren Jones, PTA

## 2024-12-26 ENCOUNTER — CLINICAL SUPPORT (OUTPATIENT)
Dept: REHABILITATION | Facility: HOSPITAL | Age: 59
End: 2024-12-26
Payer: COMMERCIAL

## 2024-12-26 DIAGNOSIS — M25.674 DECREASED ROM OF RIGHT TOE: ICD-10-CM

## 2024-12-26 DIAGNOSIS — R29.898 DECREASED STRENGTH OF LOWER EXTREMITY: Primary | ICD-10-CM

## 2024-12-26 DIAGNOSIS — R26.2 DIFFICULTY WALKING INVOLVING FOOT: ICD-10-CM

## 2024-12-26 PROCEDURE — 97140 MANUAL THERAPY 1/> REGIONS: CPT | Mod: PN

## 2024-12-26 PROCEDURE — 97112 NEUROMUSCULAR REEDUCATION: CPT | Mod: PN

## 2024-12-26 PROCEDURE — 97530 THERAPEUTIC ACTIVITIES: CPT | Mod: PN

## 2024-12-26 NOTE — PROGRESS NOTES
[facility]   Physical Therapy Treatment Note      Name: Judy West  Clinic Number: [id number]    Therapy Diagnosis:   Encounter Diagnoses   Name Primary?    Decreased strength of lower extremity Yes    Decreased ROM of right toe     Difficulty walking involving foot      Physician: Kurt Gomez DPM    Visit Date: 12/26/2024    Physician Orders: PT Eval and Treat   Medical Diagnosis from Referral:   S96.821A (ICD-10-CM) - Laceration of extensor tendon of right foot, initial encounter   M96.89 (ICD-10-CM) - Disorder of tendon repair      Evaluation Date: 9/16/2024  Authorization Period Expiration: 12/31/24  Plan of Care Expiration: 12/27/24  Visit # / Visits authorized: 10/ 20 FOTO: 10/10 PTA Visit #: 0/5      Time In: 1305  Time Out: 14:00  Total Billable Time: 55 minutes (1MT, 1 NM, 2 TA)  Precautions: Standard, migraine, anxiety, WBAT in boot; avoid excess plantarflexion with toe flexion       Subjective     Patient reports: still having trouble wearing tennis shoes  She was compliant with home exercise program.  Response to previous treatment: mild sore  Functional change: Ongoing    Pain: 3/10  Location: right dorsum of foot     Objective      Posture: 1st toe neutral alignment, 2nd toe slight ext  Palpation: nikos-incisional tenderness to dorsum of foot along first ray. Decreased nikos-incisional mobility, Mild tenderness 2nd plantar MTP joint   Sensation: normal light touch     No drainage, redness, warmth, swelling on bandage.     Range of Motion/Strength:      Ankle Right Left Pain/Dysfunction with Movement   AROM/PROM         dorsiflexion  10  10     plantarflexion  38(gentle active to tolerance)  53     inversion  30  45     eversion  10  28     Toe extension 40* (gentle passive) 60     Toe flexion  3* 20 History of bilateral bunion sx       L/E MMT Right Left Pain/Dysfunction with Movement   Hip Flexion 5/5 5/5     Hip Extension 4+/5 4+/5     Hip Abduction 4+/5 4+/5     Knee  "Flexion 4+/5 4+/5     Knee Extension 5/5 5/5     Ankle DF 4/5 5/5     Ankle PF 4/5 NWB  4+/5     Ankle Inversion 4-/5 5/5     Ankle Eversion 4-/5 5/5     Big Toe Extension 3-/5 5/5  tested in neutral   Toe flexion 4/5 5/5 Tested in neutral position       Joint Mobility:   Ankle: normal subtalar, mild talocrural restriction     Flexibility: normal calf flexibility     Gait Analysis:Without AD Assistance ind Deviations: fair heel strike in boot, mild antalgic gait      Balance deferred due to weightbearing status     CMS Impairment/Limitation/Restriction for FOTO ankle Survey     Therapist reviewed FOTO scores for Judy Foley on 11/11/2024.   FOTO documents entered into [employer] - see Media section.     Limitation Score: 51%  Category: Mobility     Treatment     Judy received the treatments listed below:      Manual therapy techniques: Joint mobilizations, Myofacial release, and Soft tissue Mobilization were applied to the: R foot for 10 minutes, including:  -- Passive dorsiflexion and passive toe extension  -- Passive dip flexion   -- 2nd toe flexion stretch    Neuromuscular re-education activities to improve: Balance, Coordination, Kinesthetic, Sense, Proprioception, and Posture for 20 minutes. The following activities were included:  -- Seated heel raise 2in deficit 3x15 1# (focus on 1st MTP extension)  -- Towel scrunches 2'  -- Toe extension isometrics 10x10" -trace activation    Therapeutic activities to improve functional performance for 25 minutes, including:  Weight shifting forward/lateral 2 x 1' ea  Reverse lunge RLE back (focus on 1st MTP extension)  Dorsiflexion walk squares 4x5   dL heel raises 2x10 (focus on 1st MTP extension)    Patient Education and Home Exercises       Education provided:   Anatomy and Pathology.  Symptom management and plan of care progressions.  Home Exercise Program.    Written Home Exercises Provided: Pt instructed to continue prior HEP. Exercises were reviewed and Judy was able to " demonstrate them prior to the end of the session.  Judy demonstrated good  understanding of the education provided. See Electronic Medical Record under Patient Instructions for exercises provided during therapy sessions    Assessment     Judy is a 58 y.o. female referred to outpatient Physical Therapy with a medical diagnosis of Laceration of extensor tendon of right foot, initial encounter, Disorder of tendon repair. Pt presents 20 weeks (8/2/24) s/p right EHL repair. Hold on PT secondary to wound infection and dehiscence requiring 2 months of wound care. Trace EHL. Focus of session on 1st MTP extension range of motion and ankle dorsiflexion strengthening.    Judy Is progressing well towards her goals.   Patient prognosis is Good.     Patient will continue to benefit from skilled outpatient physical therapy to address the deficits listed in the problem list box on initial evaluation, provide pt/family education and to maximize pt's level of independence in the home and community environment.     Patient's spiritual, cultural and educational needs considered and pt agreeable to plan of care and goals.     Anticipated barriers to physical therapy: co-morbidities     Goals:   Short Term Goals (6 Weeks):   1. Patient will be independent with home exercise program to supplement therapy in improving functional mobility. Progressing, not met  2. Patient will improve dorsiflexion active range of motion with knee extended to 8 degrees to improve gait. Progressing, not met  3. Patient will improve passive toe extension to 60. Progressing, not met  4. Patient will walk 300 ft in tennis shoe without gait impairment. Progressing, not met     Long Term Goals (12 Weeks):   1. Patient will improve FOTO score to </= 41% limited to decrease perceived limitation with mobility. Progressing, not met  2. Patient will improve impaired lower extremity strength to >/= 4+/5 to improve strength for functional tasks.Progressing, not met  3.  Patient will improve active toe extension to 50 or greater . Progressing, not met  4. Patient will improve single leg balance duration to 30 sec to improve stability. Progressing, not met  5. Pt will walk on unlevel surfaces without AD or gait deficits to promote community mobility. Progressing, not met    Plan     Plan of care Certification: 11/11/24-12/27/24 2x/week 6 weeks    Frandy Amin, PT

## 2024-12-29 DIAGNOSIS — S96.821A LACERATION OF EXTENSOR TENDON OF RIGHT FOOT, INITIAL ENCOUNTER: ICD-10-CM

## 2024-12-30 RX ORDER — OXYCODONE AND ACETAMINOPHEN 10; 325 MG/1; MG/1
1 TABLET ORAL EVERY 12 HOURS PRN
Qty: 28 TABLET | Refills: 0 | Status: SHIPPED | OUTPATIENT
Start: 2024-12-30

## 2024-12-31 ENCOUNTER — CLINICAL SUPPORT (OUTPATIENT)
Dept: REHABILITATION | Facility: HOSPITAL | Age: 59
End: 2024-12-31
Payer: COMMERCIAL

## 2024-12-31 DIAGNOSIS — R29.898 DECREASED STRENGTH OF LOWER EXTREMITY: Primary | ICD-10-CM

## 2024-12-31 DIAGNOSIS — R26.2 DIFFICULTY WALKING INVOLVING FOOT: ICD-10-CM

## 2024-12-31 DIAGNOSIS — M25.674 DECREASED ROM OF RIGHT TOE: ICD-10-CM

## 2024-12-31 PROCEDURE — 97110 THERAPEUTIC EXERCISES: CPT | Mod: PN

## 2024-12-31 PROCEDURE — 97112 NEUROMUSCULAR REEDUCATION: CPT | Mod: PN

## 2024-12-31 PROCEDURE — 97140 MANUAL THERAPY 1/> REGIONS: CPT | Mod: PN

## 2024-12-31 NOTE — PLAN OF CARE
[facility]   Physical Therapy  plan of care      Name: Judy Foley  [facility] Number: [id number]    Therapy Diagnosis:   Encounter Diagnoses   Name Primary?    Decreased strength of lower extremity Yes    Decreased ROM of right toe     Difficulty walking involving foot        Physician: Kurt Gomez DPM    Visit Date: 12/31/2024    Physician Orders: PT Eval and Treat   Medical Diagnosis from Referral:   S96.821A (ICD-10-CM) - Laceration of extensor tendon of right foot, initial encounter   M96.89 (ICD-10-CM) - Disorder of tendon repair      Evaluation Date: 9/16/2024  Authorization Period Expiration: 12/31/24  Plan of Care Expiration: 12/27/24, 2/14/25  Visit # / Visits authorized: 11/ 20 FOTO: 3/3 PTA Visit #: 0/5      Time In: 2:00  Time Out: 3:00  Total Billable Time: 60 minutes (1MT, 2 NM, 2 TA)  Precautions: Standard, migraine, anxiety, WBAT    Subjective     Patient reports: she has been doing a lot of walking today and has moderate pain in plantar aspect of foot. Still gets tingling and numbness in big toe. Starts to feel pain after about 40 minutes. Overall feeling better.  Decreased pain through dorsum of foot   She was compliant with home exercise program.  Response to previous treatment: mild sore  Functional change: Ongoing    Pain: 5/10  Location: right dorsum of foot     Objective      Posture: 1st toe neutral alignment, 2nd toe slight ext  Palpation: nikos-incisional tenderness to dorsum of foot along first ray. Normal  nikos-incisional mobility, Mild tenderness 2nd plantar MTP joint     Sensation: normal light touch     No drainage, redness, warmth, swelling     Range of Motion/Strength:      Ankle Right Left Pain/Dysfunction with Movement   AROM/PROM         dorsiflexion  10/11  10     plantarflexion  50  53     inversion  40/43  45     eversion  20  28     Toe extension 8/50 60     Toe flexion  3* 20 History of bilateral bunion sx, hard end feel      L/E MMT Right Left  Pain/Dysfunction with Movement   Hip Flexion 5/5 5/5     Hip Extension 4+/5 4+/5     Hip Abduction 4+/5 4+/5     Knee Flexion 5/5 5/5     Knee Extension 5/5 5/5     Ankle DF 4+/5 5/5     Ankle PF 3/5  wb 4+/5 wb      Ankle Inversion 4/5 5/5     Ankle Eversion 4/5 5/5     Big Toe Extension 2/5 5/5     Toe flexion 4+/5 5/5      FDL: 4+/5 right     Joint Mobility:   Ankle: normal subtalar, normal talocrural      Flexibility: normal calf flexibility     Gait Analysis:Without AD Assistance ind Deviations: fair heel strike mild antalgic gait with decreased toe extension during push off. Shortened left stride length  Clinic ambulation 600ft: 4:30 2-3/10     Balance 13 sec right 30 sec      CMS Impairment/Limitation/Restriction for FOTO ankle Survey     Therapist reviewed FOTO scores for Judy Foley on 12/31/2024.   FOTO documents entered into [employer] - see Media section.     Limitation Score: 48%  Category: Mobility     Treatment     Judy received the treatments listed in daily note    Patient Education and Home Exercises       Education provided:   Anatomy and Pathology.  Symptom management and plan of care progressions.  Home Exercise Program.    Written Home Exercises Provided: Pt instructed to continue prior HEP. Exercises were reviewed and Judy was able to demonstrate them prior to the end of the session.  Judy demonstrated good  understanding of the education provided. See Electronic Medical Record under Patient Instructions for exercises provided during therapy sessions    Assessment     Judy is a 58 y.o. female referred to outpatient Physical Therapy with a medical diagnosis of Laceration of extensor tendon of right foot, initial encounter, Disorder of tendon repair. Pt presents 20 weeks (8/2/24) s/p right EHL repair. Hold on PT secondary to wound infection and dehiscence requiring 2 months of wound care. Patient demonstrates poor EHL activation. Ankle range of motion within normal limits. Limited 1st MTP range of  motion with history of bunion surgery to affected toe. Generalized ankle strength improving, as well as balance and gait. She demonstrates improving balance and proprioception with ongoing limitations on level and unstable surfaces. Pain with loading into 1st MTP as well as mild plantar metatarsal pain through 2-3rd digits. Patient has transitioned out of boot but continues to have pain during ambulation in tennis shoe with push off. She will continue benefit from skilled PT to address ongoing strength and mobility deficits and improve community mobility.     Judy Is progressing well towards her goals.   Patient prognosis is Good.     Patient will continue to benefit from skilled outpatient physical therapy to address the deficits listed in the problem list box on initial evaluation, provide pt/family education and to maximize pt's level of independence in the home and community environment.     Patient's spiritual, cultural and educational needs considered and pt agreeable to plan of care and goals.     Anticipated barriers to physical therapy: co-morbidities, changing insurance     Goals:   Short Term Goals (6 Weeks):   1. Patient will be independent with home exercise program to supplement therapy in improving functional mobility. met  2. Patient will improve dorsiflexion active range of motion with knee extended to 8 degrees to improve gait. met  3. Patient will improve passive toe extension to 60. Progressing, not met  4. Patient will walk 300 ft in tennis shoe without gait impairment. Progressing, not met      Long Term Goals (12 Weeks):   1. Patient will improve FOTO score to </= 41% limited to decrease perceived limitation with mobility. Progressing, not met  2. Patient will improve impaired lower extremity strength to >/= 4+/5 to improve strength for functional tasks. Progressing, not met  3. Patient will improve active toe extension to 50 or greater . Progressing, not met  4. Patient will improve single  leg balance duration to 30 sec to improve stability. Progressing, not met  5. Pt will walk on unlevel surfaces without AD or gait deficits to promote community mobility. Progressing, not met    Plan     Plan of care Certification: 12/27/24-2/14/25 2x/ week 6 weeks    Jerrica Lentz, PT, DPT

## 2024-12-31 NOTE — PROGRESS NOTES
[facility]   Physical Therapy Treatment Note / plan of care      Name: Judy Foley  [facility] Number: [id number]    Therapy Diagnosis:   Encounter Diagnoses   Name Primary?    Decreased strength of lower extremity Yes    Decreased ROM of right toe     Difficulty walking involving foot        Physician: Kurt Gomez DPM    Visit Date: 12/31/2024    Physician Orders: PT Eval and Treat   Medical Diagnosis from Referral:   S96.821A (ICD-10-CM) - Laceration of extensor tendon of right foot, initial encounter   M96.89 (ICD-10-CM) - Disorder of tendon repair      Evaluation Date: 9/16/2024  Authorization Period Expiration: 12/31/24  Plan of Care Expiration: 12/27/24, 2/14/25  Visit # / Visits authorized: 11/ 20 FOTO: 3/3 PTA Visit #: 0/5      Time In: 2:00  Time Out: 3:00  Total Billable Time: 60 minutes (1MT, 2 NM, 2 TA)  Precautions: Standard, migraine, anxiety, WBAT    Subjective     Patient reports: she has been doing a lot of walking today and has moderate pain in plantar aspect of foot. Still gets tingling and numbness in big toe. Starts to feel pain after about 40 minutes. Overall feeling better.  Decreased pain through dorsum of foot   She was compliant with home exercise program.  Response to previous treatment: mild sore  Functional change: Ongoing    Pain: 5/10  Location: right dorsum of foot     Objective      Posture: 1st toe neutral alignment, 2nd toe slight ext  Palpation: nikos-incisional tenderness to dorsum of foot along first ray. Normal  nikos-incisional mobility, Mild tenderness 2nd plantar MTP joint     Sensation: normal light touch     No drainage, redness, warmth, swelling     Range of Motion/Strength:      Ankle Right Left Pain/Dysfunction with Movement   AROM/PROM         dorsiflexion  10/11  10     plantarflexion  50  53     inversion  40/43  45     eversion  20  28     Toe extension 8/50 60     Toe flexion  3* 20 History of bilateral bunion sx, hard end feel      L/E MMT  "Right Left Pain/Dysfunction with Movement   Hip Flexion 5/5 5/5     Hip Extension 4+/5 4+/5     Hip Abduction 4+/5 4+/5     Knee Flexion 5/5 5/5     Knee Extension 5/5 5/5     Ankle DF 4+/5 5/5     Ankle PF 3/5  wb 4+/5 wb      Ankle Inversion 4/5 5/5     Ankle Eversion 4/5 5/5     Big Toe Extension 2/5 5/5     Toe flexion 4+/5 5/5      FDL: 4+/5 right     Joint Mobility:   Ankle: normal subtalar, normal talocrural      Flexibility: normal calf flexibility     Gait Analysis:Without AD Assistance ind Deviations: fair heel strike mild antalgic gait with decreased toe extension during push off. Shortened left stride length  Clinic ambulation 600ft: 4:30 2-3/10     Balance 13 sec right 30 sec      CMS Impairment/Limitation/Restriction for FOTO ankle Survey     Therapist reviewed FOTO scores for Judy Foley on 12/31/2024.   FOTO documents entered into [employer] - see Media section.     Limitation Score: 48%  Category: Mobility     Treatment     Judy received the treatments listed below:      Manual therapy techniques: Joint mobilizations, Myofacial release, and Soft tissue Mobilization were applied to the: R foot for 10 minutes, including:  -- Passive dorsiflexion and passive toe extension  -- Passive dip flexion   -- 2nd toe flexion stretch    Neuromuscular re-education activities to improve: Balance, Coordination, Kinesthetic, Sense, Proprioception, and Posture for 25 minutes. The following activities were included:  -- Seated heel raise 2in deficit 3x15 1# (focus on 1st MTP extension)  -- Towel scrunches 2'  -- Toe extension isometrics 10x10" -trace activation    Therapeutic activities to improve functional performance for 25 minutes, including:  Weight shifting forward/lateral 2 x 1' ea  NP  Reverse lunge RLE back (focus on 1st MTP extension)  Dorsiflexion walk squares 4x5   dL heel raises 2x10 (focus on 1st MTP extension)    Patient Education and Home Exercises       Education provided:   Anatomy and Pathology.  Symptom " management and plan of care progressions.  Home Exercise Program.    Written Home Exercises Provided: Pt instructed to continue prior HEP. Exercises were reviewed and Judy was able to demonstrate them prior to the end of the session.  Judy demonstrated good  understanding of the education provided. See Electronic Medical Record under Patient Instructions for exercises provided during therapy sessions    Assessment     Judy is a 58 y.o. female referred to outpatient Physical Therapy with a medical diagnosis of Laceration of extensor tendon of right foot, initial encounter, Disorder of tendon repair. Pt presents 20 weeks (8/2/24) s/p right EHL repair. Hold on PT secondary to wound infection and dehiscence requiring 2 months of wound care. Patient demonstrates poor EHL activation. Ankle range of motion within normal limits. Limited 1st MTP range of motion with history of bunion surgery to affected toe. Generalized ankle strength improving, as well as balance and gait. She demonstrates improving balance and proprioception with ongoing limitations on level and unstable surfaces. Pain with loading into 1st MTP as well as mild plantar metatarsal pain through 2-3rd digits. Patient has transitioned out of boot but continues to have pain during ambulation in tennis shoe with push off. She will continue benefit from skilled PT to address ongoing strength and mobility deficits and improve community mobility.     Judy Is progressing well towards her goals.   Patient prognosis is Good.     Patient will continue to benefit from skilled outpatient physical therapy to address the deficits listed in the problem list box on initial evaluation, provide pt/family education and to maximize pt's level of independence in the home and community environment.     Patient's spiritual, cultural and educational needs considered and pt agreeable to plan of care and goals.     Anticipated barriers to physical therapy: co-morbidities, changing  insurance     Goals:   Short Term Goals (6 Weeks):   1. Patient will be independent with home exercise program to supplement therapy in improving functional mobility. met  2. Patient will improve dorsiflexion active range of motion with knee extended to 8 degrees to improve gait. met  3. Patient will improve passive toe extension to 60. Progressing, not met  4. Patient will walk 300 ft in tennis shoe without gait impairment. Progressing, not met      Long Term Goals (12 Weeks):   1. Patient will improve FOTO score to </= 41% limited to decrease perceived limitation with mobility. Progressing, not met  2. Patient will improve impaired lower extremity strength to >/= 4+/5 to improve strength for functional tasks. Progressing, not met  3. Patient will improve active toe extension to 50 or greater . Progressing, not met  4. Patient will improve single leg balance duration to 30 sec to improve stability. Progressing, not met  5. Pt will walk on unlevel surfaces without AD or gait deficits to promote community mobility. Progressing, not met    Plan     Plan of care Certification: 12/27/24-2/14/25 2x/ week 6 weeks    Jerrica Lentz, PT, DPT

## 2025-01-03 RX ORDER — AMITRIPTYLINE HYDROCHLORIDE 10 MG/1
10 TABLET, FILM COATED ORAL NIGHTLY PRN
Qty: 30 TABLET | Refills: 2 | Status: SHIPPED | OUTPATIENT
Start: 2025-01-03

## 2025-01-10 ENCOUNTER — DOCUMENTATION ONLY (OUTPATIENT)
Dept: REHABILITATION | Facility: HOSPITAL | Age: 60
End: 2025-01-10
Payer: COMMERCIAL

## 2025-01-13 DIAGNOSIS — S96.821A LACERATION OF EXTENSOR TENDON OF RIGHT FOOT, INITIAL ENCOUNTER: ICD-10-CM

## 2025-01-13 RX ORDER — OXYCODONE AND ACETAMINOPHEN 5; 325 MG/1; MG/1
1 TABLET ORAL EVERY 12 HOURS PRN
Qty: 20 TABLET | Refills: 0 | Status: SHIPPED | OUTPATIENT
Start: 2025-01-13 | End: 2025-01-22 | Stop reason: SDUPTHER

## 2025-01-13 RX ORDER — OXYCODONE AND ACETAMINOPHEN 10; 325 MG/1; MG/1
1 TABLET ORAL EVERY 12 HOURS PRN
Qty: 28 TABLET | Refills: 0 | OUTPATIENT
Start: 2025-01-13

## 2025-01-14 ENCOUNTER — CLINICAL SUPPORT (OUTPATIENT)
Dept: REHABILITATION | Facility: HOSPITAL | Age: 60
End: 2025-01-14
Payer: COMMERCIAL

## 2025-01-14 DIAGNOSIS — M25.674 DECREASED ROM OF RIGHT TOE: ICD-10-CM

## 2025-01-14 DIAGNOSIS — R26.2 DIFFICULTY WALKING INVOLVING FOOT: ICD-10-CM

## 2025-01-14 DIAGNOSIS — R29.898 DECREASED STRENGTH OF LOWER EXTREMITY: Primary | ICD-10-CM

## 2025-01-14 PROCEDURE — 97530 THERAPEUTIC ACTIVITIES: CPT | Mod: PN

## 2025-01-14 PROCEDURE — 97140 MANUAL THERAPY 1/> REGIONS: CPT | Mod: PN

## 2025-01-14 PROCEDURE — 97112 NEUROMUSCULAR REEDUCATION: CPT | Mod: PN

## 2025-01-14 NOTE — PROGRESS NOTES
"[facility]   Physical Therapy Treatment Note      Name: Judy West  Clinic Number: [id number]    Therapy Diagnosis:   Encounter Diagnoses   Name Primary?    Decreased strength of lower extremity Yes    Decreased ROM of right toe     Difficulty walking involving foot      Physician: Kurt Gomez DPM    Visit Date: 1/14/2025    Physician Orders: PT Eval and Treat   Medical Diagnosis from Referral:   S96.821A (ICD-10-CM) - Laceration of extensor tendon of right foot, initial encounter   M96.89 (ICD-10-CM) - Disorder of tendon repair      Evaluation Date: 9/16/2024  Authorization Period Expiration: 12/31/24  Plan of Care Expiration: 12/27/24, 2/14/25  Visit # / Visits authorized: 1/ 20 +12 FOTO: 3/3 PTA Visit #: 0/5      Time In: 11:05  Time Out: 12:00  Total Billable Time: 55 minutes (1MT, 2 NM, 2 TA)  Precautions: Standard, migraine, anxiety, WBAT    Subjective     Patient reports: continued exercises over break awaiting new insurance. Overall doing better pain wise but continues to have limited motion.  She was compliant with home exercise program.  Response to previous treatment: mild sore  Functional change: Ongoing    Pain: 5/10  Location: right dorsum of foot     Objective    Objective measures at plan of care     Treatment     Judy received the treatments listed below:      Manual therapy techniques: Joint mobilizations, Myofacial release, and Soft tissue Mobilization were applied to the: R foot for 10 minutes, including:  -- Passive dorsiflexion and passive toe extension  -- Passive dip flexion   -- 2nd toe flexion stretch    Neuromuscular re-education activities to improve: Balance, Coordination, Kinesthetic, Sense, Proprioception, and Posture for 25 minutes. The following activities were included:  -- Seated heel raise 2in deficit 3x15 1# (focus on 1st MTP extension)  -- Towel scrunches 2'  -- Toe extension isometrics 10x10" -trace activation  -- dorsiflexion / plantarflexion  " red theraband 20x    Therapeutic activities to improve functional performance for 25 minutes, including:  Reverse lunge RLE back (focus on 1st MTP extension) 2x10  Dorsiflexion walk squares 4x5   dL heel raises 2x10 (focus on 1st MTP extension)  Modified single leg heel raise 2x6  Step up 6in 2x10  Next: step downs forward 4in     Patient Education and Home Exercises       Education provided:   Anatomy and Pathology.  Symptom management and plan of care progressions.  Home Exercise Program.    Written Home Exercises Provided: Pt instructed to continue prior HEP. Exercises were reviewed and Judy was able to demonstrate them prior to the end of the session.  Judy demonstrated good  understanding of the education provided. See Electronic Medical Record under Patient Instructions for exercises provided during therapy sessions    Assessment     Judy is a 58 y.o. female referred to outpatient Physical Therapy with a medical diagnosis of Laceration of extensor tendon of right foot, initial encounter, Disorder of tendon repair. Pt presents 20 weeks (8/2/24) s/p right EHL repair. Hold on PT secondary to wound infection and dehiscence requiring 2 months of wound care.   Patient presents with low grade foot pain. Continues to have limited activation of EHL. Gradually improving toe range of motion. Continuing to work on functional mobility, balance and proprioception.  Gradual progression of loading MTP extension. She will continue benefit from skilled PT to address ongoing strength and mobility deficits and improve community mobility.     Judy Is progressing well towards her goals.   Patient prognosis is Good.     Patient will continue to benefit from skilled outpatient physical therapy to address the deficits listed in the problem list box on initial evaluation, provide pt/family education and to maximize pt's level of independence in the home and community environment.     Patient's spiritual, cultural and educational needs  considered and pt agreeable to plan of care and goals.     Anticipated barriers to physical therapy: co-morbidities, changing insurance     Goals:   Short Term Goals (6 Weeks):   1. Patient will be independent with home exercise program to supplement therapy in improving functional mobility. met  2. Patient will improve dorsiflexion active range of motion with knee extended to 8 degrees to improve gait. met  3. Patient will improve passive toe extension to 60. Progressing, not met  4. Patient will walk 300 ft in tennis shoe without gait impairment. Progressing, not met      Long Term Goals (12 Weeks):   1. Patient will improve FOTO score to </= 41% limited to decrease perceived limitation with mobility. Progressing, not met  2. Patient will improve impaired lower extremity strength to >/= 4+/5 to improve strength for functional tasks. Progressing, not met  3. Patient will improve active toe extension to 50 or greater . Progressing, not met  4. Patient will improve single leg balance duration to 30 sec to improve stability. Progressing, not met  5. Pt will walk on unlevel surfaces without AD or gait deficits to promote community mobility. Progressing, not met    Plan     Plan of care Certification: 12/27/24-2/14/25 2x/ week 6 weeks    Jerrica Lentz, PT, DPT

## 2025-01-16 ENCOUNTER — CLINICAL SUPPORT (OUTPATIENT)
Dept: REHABILITATION | Facility: HOSPITAL | Age: 60
End: 2025-01-16
Payer: COMMERCIAL

## 2025-01-16 DIAGNOSIS — R26.2 DIFFICULTY WALKING INVOLVING FOOT: ICD-10-CM

## 2025-01-16 DIAGNOSIS — R29.898 DECREASED STRENGTH OF LOWER EXTREMITY: Primary | ICD-10-CM

## 2025-01-16 DIAGNOSIS — M25.674 DECREASED ROM OF RIGHT TOE: ICD-10-CM

## 2025-01-16 PROCEDURE — 97530 THERAPEUTIC ACTIVITIES: CPT | Mod: PN

## 2025-01-16 PROCEDURE — 97140 MANUAL THERAPY 1/> REGIONS: CPT | Mod: PN

## 2025-01-16 PROCEDURE — 97112 NEUROMUSCULAR REEDUCATION: CPT | Mod: PN

## 2025-01-16 NOTE — PROGRESS NOTES
"[facility]   Physical Therapy Treatment Note      Name: Judy West  Clinic Number: [id number]    Therapy Diagnosis:   Encounter Diagnoses   Name Primary?    Decreased strength of lower extremity Yes    Decreased ROM of right toe     Difficulty walking involving foot        Physician: Kurt Gomez DPM    Visit Date: 1/16/2025    Physician Orders: PT Eval and Treat   Medical Diagnosis from Referral:   S96.821A (ICD-10-CM) - Laceration of extensor tendon of right foot, initial encounter   M96.89 (ICD-10-CM) - Disorder of tendon repair      Evaluation Date: 9/16/2024  Authorization Period Expiration: 12/31/24  Plan of Care Expiration: 12/27/24, 2/14/25  Visit # / Visits authorized: 2/ 20 +12 FOTO: 3/3 PTA Visit #: 0/5      Time In:3:00  Time Out: 3:55  Total Billable Time: 55 minutes (1MT, 2 NM, 2 TA)  Precautions: Standard, migraine, anxiety, WBAT    Subjective     Patient reports: continued exercises over break awaiting new insurance. Overall doing better pain wise but continues to have limited motion.  She was compliant with home exercise program.  Response to previous treatment: mild sore  Functional change: Ongoing    Pain: 5/10  Location: right dorsum of foot     Objective    Objective measures at plan of care   Passive toe ext 42 deg   Treatment     Judy received the treatments listed below:      Manual therapy techniques: Joint mobilizations, Myofacial release, and Soft tissue Mobilization were applied to the: R foot for 10 minutes, including:  -- Passive dorsiflexion and passive toe extension  -- Passive dip flexion   -- 2nd toe flexion stretch    Neuromuscular re-education activities to improve: Balance, Coordination, Kinesthetic, Sense, Proprioception, and Posture for 25 minutes. The following activities were included:  -- Seated heel raise 2in deficit 3x10 20# (focus on 1st MTP extension)  -- Towel scrunches 2'  -- Toe extension isometrics 10x10" -trace activation  -- " dorsiflexion / plantarflexion  red theraband 20x NP  -- nmes EHL 65hz 2 sec ramp 12 sec on 50 sec off     Therapeutic activities to improve functional performance for 25 minutes, including:  Reverse lunge RLE back (focus on 1st MTP extension) 2x10  Dorsiflexion walk squares 4x5   dL heel raises 2x10 (focus on 1st MTP extension)  Modified single leg heel raise 2x6  Step up 6in 2x10  step downs forward 4in 2x10  Sled push/pull 3x 50ft 25#     Patient Education and Home Exercises       Education provided:   Anatomy and Pathology.  Symptom management and plan of care progressions.  Home Exercise Program.    Written Home Exercises Provided: Pt instructed to continue prior HEP. Exercises were reviewed and Judy was able to demonstrate them prior to the end of the session.  Judy demonstrated good  understanding of the education provided. See Electronic Medical Record under Patient Instructions for exercises provided during therapy sessions    Assessment     Judy is a 58 y.o. female referred to outpatient Physical Therapy with a medical diagnosis of Laceration of extensor tendon of right foot, initial encounter, Disorder of tendon repair. Pt presents 20 weeks (8/2/24) s/p right EHL repair. Hold on PT secondary to wound infection and dehiscence requiring 2 months of wound care.   Patient presents with low grade foot pain. Continues to have limited activation of EHL. Resumed NMES with slight activation noted. May continue for several visits to assess response. May consider dry needling for more specific activation, but will consider infection history and risk. Soreness reported with MTP loading. Will continue to gradually progress 1st MTP range of motion and strength.     Judy Is progressing well towards her goals.   Patient prognosis is Good.     Patient will continue to benefit from skilled outpatient physical therapy to address the deficits listed in the problem list box on initial evaluation, provide pt/family education  and to maximize pt's level of independence in the home and community environment.     Patient's spiritual, cultural and educational needs considered and pt agreeable to plan of care and goals.     Anticipated barriers to physical therapy: co-morbidities, changing insurance     Goals:   Short Term Goals (6 Weeks):   1. Patient will be independent with home exercise program to supplement therapy in improving functional mobility. met  2. Patient will improve dorsiflexion active range of motion with knee extended to 8 degrees to improve gait. met  3. Patient will improve passive toe extension to 60. Progressing, not met  4. Patient will walk 300 ft in tennis shoe without gait impairment. Progressing, not met      Long Term Goals (12 Weeks):   1. Patient will improve FOTO score to </= 41% limited to decrease perceived limitation with mobility. Progressing, not met  2. Patient will improve impaired lower extremity strength to >/= 4+/5 to improve strength for functional tasks. Progressing, not met  3. Patient will improve active toe extension to 50 or greater . Progressing, not met  4. Patient will improve single leg balance duration to 30 sec to improve stability. Progressing, not met  5. Pt will walk on unlevel surfaces without AD or gait deficits to promote community mobility. Progressing, not met    Plan     Plan of care Certification: 12/27/24-2/14/25 2x/ week 6 weeks    Jerrica Lentz, PT, DPT

## 2025-01-22 DIAGNOSIS — S96.821A LACERATION OF EXTENSOR TENDON OF RIGHT FOOT, INITIAL ENCOUNTER: ICD-10-CM

## 2025-01-22 RX ORDER — OXYCODONE AND ACETAMINOPHEN 5; 325 MG/1; MG/1
1 TABLET ORAL EVERY 12 HOURS PRN
Qty: 20 TABLET | Refills: 0 | Status: SHIPPED | OUTPATIENT
Start: 2025-01-22 | End: 2025-01-31 | Stop reason: SDUPTHER

## 2025-01-26 NOTE — TELEPHONE ENCOUNTER
No care due was identified.  [facility] Embedded Care Due Messages. Reference number: [id number].   1/26/2025 1:32:17 PM CST

## 2025-01-27 ENCOUNTER — TELEPHONE (OUTPATIENT)
Dept: PRIMARY CARE CLINIC | Facility: CLINIC | Age: 60
End: 2025-01-27
Payer: COMMERCIAL

## 2025-01-27 RX ORDER — BUTALBITAL, ACETAMINOPHEN AND CAFFEINE 50; 325; 40 MG/1; MG/1; MG/1
1 TABLET ORAL EVERY 6 HOURS PRN
Qty: 30 TABLET | Refills: 5 | Status: SHIPPED | OUTPATIENT
Start: 2025-01-27

## 2025-01-27 NOTE — TELEPHONE ENCOUNTER
----- Message from Lamar sent at 1/27/2025  3:34 PM CST -----  Type:  Pharmacy Calling to Clarify an RX      [facility]  Prescription Name:butalbital-acetaminophen-caffeine -40 mg (FIORICET, ESGIC) -40 mg per tablet  What do they need to clarify?:wanted to make sure office is aware pt is prescribed percocet as well  Best Call Back Number:[phone]  Additional Information:

## 2025-01-28 ENCOUNTER — CLINICAL SUPPORT (OUTPATIENT)
Dept: REHABILITATION | Facility: HOSPITAL | Age: 60
End: 2025-01-28
Payer: COMMERCIAL

## 2025-01-28 DIAGNOSIS — R29.898 DECREASED STRENGTH OF LOWER EXTREMITY: Primary | ICD-10-CM

## 2025-01-28 DIAGNOSIS — R26.2 DIFFICULTY WALKING INVOLVING FOOT: ICD-10-CM

## 2025-01-28 DIAGNOSIS — M25.674 DECREASED ROM OF RIGHT TOE: ICD-10-CM

## 2025-01-28 PROCEDURE — 97112 NEUROMUSCULAR REEDUCATION: CPT | Mod: PN,CQ

## 2025-01-28 PROCEDURE — 97530 THERAPEUTIC ACTIVITIES: CPT | Mod: PN,CQ

## 2025-01-28 PROCEDURE — 97140 MANUAL THERAPY 1/> REGIONS: CPT | Mod: PN,CQ
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
## 2025-01-30 ENCOUNTER — CLINICAL SUPPORT (OUTPATIENT)
Dept: REHABILITATION | Facility: HOSPITAL | Age: 60
End: 2025-01-30
Payer: COMMERCIAL

## 2025-01-30 DIAGNOSIS — R29.898 DECREASED STRENGTH OF LOWER EXTREMITY: Primary | ICD-10-CM

## 2025-01-30 DIAGNOSIS — R26.2 DIFFICULTY WALKING INVOLVING FOOT: ICD-10-CM

## 2025-01-30 DIAGNOSIS — M25.674 DECREASED ROM OF RIGHT TOE: ICD-10-CM

## 2025-01-30 PROCEDURE — 97140 MANUAL THERAPY 1/> REGIONS: CPT | Mod: PN

## 2025-01-30 PROCEDURE — 97112 NEUROMUSCULAR REEDUCATION: CPT | Mod: PN
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
## 2025-01-31 DIAGNOSIS — S96.821A LACERATION OF EXTENSOR TENDON OF RIGHT FOOT, INITIAL ENCOUNTER: ICD-10-CM

## 2025-01-31 RX ORDER — OXYCODONE AND ACETAMINOPHEN 5; 325 MG/1; MG/1
1 TABLET ORAL EVERY 12 HOURS PRN
Qty: 20 TABLET | Refills: 0 | Status: SHIPPED | OUTPATIENT
Start: 2025-01-31

## 2025-02-04 ENCOUNTER — CLINICAL SUPPORT (OUTPATIENT)
Dept: REHABILITATION | Facility: HOSPITAL | Age: 60
End: 2025-02-04
Payer: COMMERCIAL

## 2025-02-04 DIAGNOSIS — R26.2 DIFFICULTY WALKING INVOLVING FOOT: ICD-10-CM

## 2025-02-04 DIAGNOSIS — M25.674 DECREASED ROM OF RIGHT TOE: ICD-10-CM

## 2025-02-04 DIAGNOSIS — R29.898 DECREASED STRENGTH OF LOWER EXTREMITY: Primary | ICD-10-CM

## 2025-02-04 PROCEDURE — 97140 MANUAL THERAPY 1/> REGIONS: CPT | Mod: PN

## 2025-02-04 PROCEDURE — 97112 NEUROMUSCULAR REEDUCATION: CPT | Mod: PN

## 2025-02-04 NOTE — PROGRESS NOTES
[facility]   Physical Therapy Treatment Note      Name: Judy West  Clinic Number: [id number]    Therapy Diagnosis:   Encounter Diagnoses   Name Primary?    Decreased strength of lower extremity Yes    Decreased ROM of right toe     Difficulty walking involving foot        Physician: Kurt Gomez DPM    Visit Date: 2/4/2025    Physician Orders: PT Eval and Treat   Medical Diagnosis from Referral:   S96.821A (ICD-10-CM) - Laceration of extensor tendon of right foot, initial encounter   M96.89 (ICD-10-CM) - Disorder of tendon repair      Evaluation Date: 9/16/2024  Authorization Period Expiration: 12/31/24  Plan of Care Expiration: 12/27/24, 2/14/25  Visit # / Visits authorized: 5/ 10 +12 FOTO: 3/3 PTA Visit #: 1/5      Time In: 14:08  Time Out: 14:52  Total Billable Time: 44 minutes (1MT, 2 NM)  Precautions: Standard, migraine, anxiety, WBAT    Subjective     Patient reports: still irritable, especially with standing and walking. Discomfort wearing shoes at dorsum of foot. Returns to MD next week.   She was compliant with home exercise program.  Response to previous treatment: mild sore  Functional change: Ongoing    Pain: 4/10  Location: right dorsum of foot     Objective    Objective measures at plan of care     Passive 1st MTP ext 50 deg, 1st  MTP flexion 0 deg  tenderness along 1-2 metatarsals   ~5 degrees active range of motion 1st toe extension from partially plantarflexed position  Mild redness of incision, unchanged from baseline.   Treatment     Judy received the treatments listed below:      Manual therapy techniques: Joint mobilizations, Myofacial release, and Soft tissue Mobilization were applied to the: R foot for 10 minutes, including:  -- Passive dorsiflexion and passive toe extension  -- Passive dip flexion   -- 2nd toe flexion stretch    Neuromuscular re-education activities to improve: Balance, Coordination, Kinesthetic, Sense, Proprioception, and Posture for 29  "minutes. The following activities were included:  -- Seated heel raise 2in deficit 3x10 20# (focus on 1st MTP extension)  -- Towel scrunches 2'  -- Toe extension isometrics 10x10" -poor activation  --active toe extension from slide board decline (12 deg) 10x  -- tendon glides- (toes up ankles up, toes down ankle down) 15x   -- dorsiflexion / plantarflexion  red theraband 20x NP  -- nmes EHL 65hz 2 sec ramp 12 sec on 50 sec off NP  --baps board 20x pf/df, inv/ev L3  -- mini squats 2x10     Therapeutic activities to improve functional performance for 5 minutes, including:  Reverse lunge RLE back (focus on 1st MTP extension) 2x10 NP  Dorsiflexion walk squares 10x  dL heel raises 2x10 (focus on 1st MTP extension)  Modified single leg heel raise 2x6 NP  Step up 6in 2x10 NP  step downs forward 4in 2x10  Sled push/pull 3x 50ft 25# NP    Patient Education and Home Exercises       Education provided:   Anatomy and Pathology.  Symptom management and plan of care progressions.  Home Exercise Program.    Written Home Exercises Provided: Pt instructed to continue prior HEP. Exercises were reviewed and Judy was able to demonstrate them prior to the end of the session.  Judy demonstrated good  understanding of the education provided. See Electronic Medical Record under Patient Instructions for exercises provided during therapy sessions    Assessment     Judy is a 58 y.o. female referred to outpatient Physical Therapy with a medical diagnosis of Laceration of extensor tendon of right foot, initial encounter, Disorder of tendon repair. Pt presents 20 weeks (8/2/24) s/p right EHL repair. Hold on PT secondary to wound infection and dehiscence requiring 2 months of wound care.   Patient continues to have increased pain and soreness in dorsum of foot. No significant redness, warmth, swelling compared to baseline. Patient demonstrates decreased tendon gliding, limited partially by history of bunion surgery. Added ankle bias tendon " gliding to improve tendon mobility. Patient demonstrates slight improvement in activation today demonstrating approximately 5 degrees of active motion from plantarflexed position. Overall maintained lower intensity exercises to allow for recovery. Patient will return to MD next Monday. Continue to monitor and progress as tolerated.     Judy Is progressing well towards her goals.   Patient prognosis is Good.     Patient will continue to benefit from skilled outpatient physical therapy to address the deficits listed in the problem list box on initial evaluation, provide pt/family education and to maximize pt's level of independence in the home and community environment.     Patient's spiritual, cultural and educational needs considered and pt agreeable to plan of care and goals.     Anticipated barriers to physical therapy: co-morbidities, changing insurance     Goals:   Short Term Goals (6 Weeks):   1. Patient will be independent with home exercise program to supplement therapy in improving functional mobility. met  2. Patient will improve dorsiflexion active range of motion with knee extended to 8 degrees to improve gait. met  3. Patient will improve passive toe extension to 60. Progressing, not met  4. Patient will walk 300 ft in tennis shoe without gait impairment. Progressing, not met      Long Term Goals (12 Weeks):   1. Patient will improve FOTO score to </= 41% limited to decrease perceived limitation with mobility. Progressing, not met  2. Patient will improve impaired lower extremity strength to >/= 4+/5 to improve strength for functional tasks. Progressing, not met  3. Patient will improve active toe extension to 50 or greater . Progressing, not met  4. Patient will improve single leg balance duration to 30 sec to improve stability. Progressing, not met  5. Pt will walk on unlevel surfaces without AD or gait deficits to promote community mobility. Progressing, not met    Plan     Plan of care Certification:  12/27/24-2/14/25 2x/ week 6 weeks    Jerrica Lentz, PT, DPT

## 2025-02-06 ENCOUNTER — PATIENT MESSAGE (OUTPATIENT)
Dept: PODIATRY | Facility: CLINIC | Age: 60
End: 2025-02-06
Payer: COMMERCIAL

## 2025-02-09 ENCOUNTER — PATIENT MESSAGE (OUTPATIENT)
Dept: PODIATRY | Facility: CLINIC | Age: 60
End: 2025-02-09
Payer: COMMERCIAL

## 2025-02-09 DIAGNOSIS — S96.821A LACERATION OF EXTENSOR TENDON OF RIGHT FOOT, INITIAL ENCOUNTER: ICD-10-CM

## 2025-02-10 RX ORDER — OXYCODONE AND ACETAMINOPHEN 5; 325 MG/1; MG/1
1 TABLET ORAL EVERY 12 HOURS PRN
Qty: 20 TABLET | Refills: 0 | Status: SHIPPED | OUTPATIENT
Start: 2025-02-10

## 2025-02-13 ENCOUNTER — DOCUMENTATION ONLY (OUTPATIENT)
Dept: REHABILITATION | Facility: HOSPITAL | Age: 60
End: 2025-02-13
Payer: COMMERCIAL

## 2025-02-13 ENCOUNTER — PATIENT MESSAGE (OUTPATIENT)
Dept: REHABILITATION | Facility: HOSPITAL | Age: 60
End: 2025-02-13
Payer: COMMERCIAL

## 2025-02-13 NOTE — PROGRESS NOTES
Physical Therapy Discharge summary    Physician: Kurt Gomez DPM     Visit Date: 2025     Physician Orders: PT Eval and Treat   Medical Diagnosis from Referral:   S96.821A (ICD-10-CM) - Laceration of extensor tendon of right foot, initial encounter   M96.89 (ICD-10-CM) - Disorder of tendon repair      Evaluation Date: 2024  Authorization Period Expiration: 24  Plan of Care Expiration: 24, 25  Visit # / Visits authorized: 17 total   FOTO: 3/3   PTA Visit #:       Pt was evaluated on 24 and was seen 17 times for PT. Pt has not attended PT since 25 and cancelled remaining visits due to insurance. Patient allowed 10 total visits (primary/specialty/PT) per calendar year and would like to save visits for needs throughout the year. Patient given HEP. Plan of care and/or authorization . Pt to be discharged at this time. Continues to lack active toe extension, improved toe extension passive range of motion. Moderate progress towards goals as noted in most recent plan of care.     Jerrica Lentz, PT, DPT

## 2025-02-17 ENCOUNTER — OFFICE VISIT (OUTPATIENT)
Dept: PODIATRY | Facility: CLINIC | Age: 60
End: 2025-02-17
Payer: COMMERCIAL

## 2025-02-17 DIAGNOSIS — M79.671 RIGHT FOOT PAIN: ICD-10-CM

## 2025-02-17 DIAGNOSIS — S96.821A LACERATION OF EXTENSOR TENDON OF RIGHT FOOT, INITIAL ENCOUNTER: Primary | ICD-10-CM

## 2025-02-17 PROCEDURE — 99214 OFFICE O/P EST MOD 30 MIN: CPT | Mod: S$GLB,,, | Performed by: PODIATRIST

## 2025-02-17 RX ORDER — MELOXICAM 15 MG/1
15 TABLET ORAL DAILY
Qty: 30 TABLET | Refills: 2 | Status: SHIPPED | OUTPATIENT
Start: 2025-02-17

## 2025-02-17 NOTE — PROGRESS NOTES
Subjective:      Patient ID: Judy Foley is a 59 y.o. female.    Chief Complaint:   Wound Check (Right medial top of foot with tenderness/Not in PT /Numbness with tingling after prolonged )    Judy is a 59 y.o. female who presents to the podiatry clinic  with complaint of  right foot pain.  Four-month follow-up right extensor hallucis longus tendon tear/repair.  Physical therapy is helping.  Had a postoperative dehiscence which has resolved after seeing wound care.  No new complaints.  Overall improving/tendon strength slowly improvement as well.    Review of Systems   Constitutional: Negative for chills, decreased appetite, fever and malaise/fatigue.   HENT:  Negative for congestion, hearing loss, nosebleeds and tinnitus.    Eyes:  Negative for double vision, pain, photophobia and visual disturbance.   Cardiovascular:  Negative for chest pain, claudication, cyanosis and leg swelling.   Respiratory:  Negative for cough, hemoptysis, shortness of breath and wheezing.    Endocrine: Negative for cold intolerance and heat intolerance.   Hematologic/Lymphatic: Negative for adenopathy and bleeding problem.   Skin:  Negative for color change, dry skin, itching, nail changes and suspicious lesions.   Musculoskeletal:  Positive for joint pain, joint swelling and stiffness. Negative for arthritis.   Gastrointestinal:  Negative for abdominal pain, jaundice, nausea and vomiting.   Genitourinary:  Negative for dysuria, frequency and hematuria.   Neurological:  Negative for difficulty with concentration, loss of balance, numbness, paresthesias and sensory change.   Psychiatric/Behavioral:  Negative for altered mental status, hallucinations and suicidal ideas. The patient is not nervous/anxious.    Allergic/Immunologic: Negative for environmental allergies and persistent infections.           Objective:      Physical Exam  Vitals reviewed.   Constitutional:       Appearance: She is well-developed.   HENT:      Head: Normocephalic  and atraumatic.   Cardiovascular:      Pulses:           Dorsalis pedis pulses are 2+ on the right side and 2+ on the left side.        Posterior tibial pulses are 2+ on the right side and 2+ on the left side.   Pulmonary:      Effort: Pulmonary effort is normal.   Musculoskeletal:         General: Normal range of motion.      Comments: Left extensor hallucis longus tendon noted to be intact at this point.  Can appreciate firing of the tendon/muscle.  Overall improving.  Still significant weakness due to issue with wound dehiscence/scar tissue formation.  Overall improving however.   Skin:     General: Skin is warm and dry.      Capillary Refill: Capillary refill takes 2 to 3 seconds.      Comments: Skin turgor is normal bilaterally.  Skin texture is well hydrated to both lower extremities.  No lesions or rashes or wounds appreciated bilaterally.  Nail plates 1 through 5 bilaterally are within normal limits for length and thickness.  No nail clubbing or incurvation noted.   Neurological:      Mental Status: She is alert and oriented to person, place, and time.      Comments: Sharp dull light touch vibratory proprioceptive sensation are intact bilaterally.  Deep tendon reflexes to patellar and Achilles tendon are symmetrical 2 over 4 bilaterally.  No ankle clonus or Babinski reflexes noted bilaterally.  Coordination is normal to both feet and lower extremities.   Psychiatric:         Behavior: Behavior normal.               Assessment:       Encounter Diagnoses   Name Primary?    Laceration of extensor tendon of right foot, initial encounter Yes    Right foot pain      Independent visualization of imaging was performed.  Results were reviewed in detail with patient.       Plan:       Judy was seen today for wound check.    Diagnoses and all orders for this visit:    Laceration of extensor tendon of right foot, initial encounter  -     ORTHOTIC DEVICE (DME)    Right foot pain  -     ORTHOTIC DEVICE (DME)    Other  orders  -     meloxicam (MOBIC) 15 MG tablet; Take 1 tablet (15 mg total) by mouth once daily.      I counseled the patient on her conditions, their implications and medical management.      The nature of the condition, options for management, as well as potential risks and complications were discussed in detail with patient. Patient was amenable to my recommendations and left my office fully informed and will follow up as instructed or sooner if necessary.      I recommended patient be fitted for orthoses.  I explained that orthoses may improve function of the foot, reduce pain, decrease pronation, increase efficiency of muscle function of the foot and ankle and prevent surgery.  Alternative forms of biomechanical control of the foot and ankle were discussed with the patient.      Begin topical compound medication for pain/neuropathy symptoms.  Follow-up in 3 months.

## 2025-02-18 DIAGNOSIS — S96.821A LACERATION OF EXTENSOR TENDON OF RIGHT FOOT, INITIAL ENCOUNTER: ICD-10-CM

## 2025-02-18 DIAGNOSIS — F41.9 ANXIETY: ICD-10-CM

## 2025-02-18 RX ORDER — OXYCODONE AND ACETAMINOPHEN 5; 325 MG/1; MG/1
1 TABLET ORAL EVERY 12 HOURS PRN
Qty: 20 TABLET | Refills: 0 | Status: SHIPPED | OUTPATIENT
Start: 2025-02-18 | End: 2025-02-20

## 2025-02-18 RX ORDER — SUMATRIPTAN SUCCINATE 25 MG/1
25 TABLET ORAL
Qty: 9 TABLET | Refills: 1 | Status: SHIPPED | OUTPATIENT
Start: 2025-02-18

## 2025-02-18 RX ORDER — ESCITALOPRAM OXALATE 20 MG/1
20 TABLET ORAL DAILY
Qty: 90 TABLET | Refills: 3 | Status: SHIPPED | OUTPATIENT
Start: 2025-02-18 | End: 2026-02-18

## 2025-02-18 NOTE — TELEPHONE ENCOUNTER
No care due was identified.  [facility] Embedded Care Due Messages. Reference number: [id number].   2/18/2025 11:30:51 AM CST

## 2025-02-20 RX ORDER — OXYCODONE AND ACETAMINOPHEN 5; 325 MG/1; MG/1
1 TABLET ORAL EVERY 12 HOURS PRN
Qty: 20 TABLET | Refills: 0 | Status: SHIPPED | OUTPATIENT
Start: 2025-02-20 | End: 2025-03-02

## 2025-03-16 DIAGNOSIS — S96.821A LACERATION OF EXTENSOR TENDON OF RIGHT FOOT, INITIAL ENCOUNTER: ICD-10-CM

## 2025-03-17 RX ORDER — OXYCODONE AND ACETAMINOPHEN 5; 325 MG/1; MG/1
1 TABLET ORAL EVERY 12 HOURS PRN
Qty: 20 TABLET | Refills: 0 | Status: SHIPPED | OUTPATIENT
Start: 2025-03-17 | End: 2025-03-27

## 2025-03-17 RX ORDER — SUMATRIPTAN SUCCINATE 25 MG/1
25 TABLET ORAL
Qty: 9 TABLET | Refills: 1 | Status: SHIPPED | OUTPATIENT
Start: 2025-03-17

## 2025-03-17 NOTE — TELEPHONE ENCOUNTER
No care due was identified.  [facility] Embedded Care Due Messages. Reference number: [id number].   3/17/2025 10:06:42 AM CDT

## 2025-03-19 NOTE — TELEPHONE ENCOUNTER
No care due was identified.  [facility] Embedded Care Due Messages. Reference number: [id number].   3/19/2025 6:41:30 PM CDT

## 2025-03-20 RX ORDER — BUTALBITAL, ACETAMINOPHEN AND CAFFEINE 50; 325; 40 MG/1; MG/1; MG/1
1 TABLET ORAL EVERY 6 HOURS PRN
Qty: 30 TABLET | Refills: 5 | Status: SHIPPED | OUTPATIENT
Start: 2025-03-20

## 2025-03-30 DIAGNOSIS — S96.821A LACERATION OF EXTENSOR TENDON OF RIGHT FOOT, INITIAL ENCOUNTER: ICD-10-CM

## 2025-03-31 RX ORDER — OXYCODONE AND ACETAMINOPHEN 5; 325 MG/1; MG/1
1 TABLET ORAL EVERY 12 HOURS PRN
Qty: 20 TABLET | Refills: 0 | Status: SHIPPED | OUTPATIENT
Start: 2025-03-31 | End: 2025-04-10

## 2025-04-11 DIAGNOSIS — S96.821A LACERATION OF EXTENSOR TENDON OF RIGHT FOOT, INITIAL ENCOUNTER: ICD-10-CM

## 2025-04-11 RX ORDER — OXYCODONE AND ACETAMINOPHEN 5; 325 MG/1; MG/1
1 TABLET ORAL EVERY 12 HOURS PRN
Qty: 20 TABLET | Refills: 0 | Status: SHIPPED | OUTPATIENT
Start: 2025-04-11 | End: 2025-04-21

## 2025-04-20 DIAGNOSIS — S96.821A LACERATION OF EXTENSOR TENDON OF RIGHT FOOT, INITIAL ENCOUNTER: ICD-10-CM

## 2025-04-21 RX ORDER — OXYCODONE AND ACETAMINOPHEN 5; 325 MG/1; MG/1
1 TABLET ORAL EVERY 12 HOURS PRN
Qty: 20 TABLET | Refills: 0 | Status: SHIPPED | OUTPATIENT
Start: 2025-04-21 | End: 2025-05-01

## 2025-04-24 RX ORDER — AMITRIPTYLINE HYDROCHLORIDE 10 MG/1
10 TABLET, FILM COATED ORAL NIGHTLY PRN
Qty: 30 TABLET | Refills: 2 | Status: SHIPPED | OUTPATIENT
Start: 2025-04-24

## 2025-04-28 DIAGNOSIS — S96.821A LACERATION OF EXTENSOR TENDON OF RIGHT FOOT, INITIAL ENCOUNTER: ICD-10-CM

## 2025-04-28 RX ORDER — OXYCODONE AND ACETAMINOPHEN 5; 325 MG/1; MG/1
1 TABLET ORAL EVERY 12 HOURS PRN
Qty: 20 TABLET | Refills: 0 | Status: SHIPPED | OUTPATIENT
Start: 2025-04-28 | End: 2025-05-08

## 2025-05-08 DIAGNOSIS — S96.821A LACERATION OF EXTENSOR TENDON OF RIGHT FOOT, INITIAL ENCOUNTER: ICD-10-CM

## 2025-05-08 RX ORDER — OXYCODONE AND ACETAMINOPHEN 5; 325 MG/1; MG/1
1 TABLET ORAL EVERY 12 HOURS PRN
Qty: 20 TABLET | Refills: 0 | Status: SHIPPED | OUTPATIENT
Start: 2025-05-08 | End: 2025-05-18

## 2025-05-11 NOTE — TELEPHONE ENCOUNTER
No care due was identified.  [facility] Embedded Care Due Messages. Reference number: [id number].   5/11/2025 9:39:19 AM CDT

## 2025-05-12 RX ORDER — BUTALBITAL, ACETAMINOPHEN AND CAFFEINE 50; 325; 40 MG/1; MG/1; MG/1
1 TABLET ORAL EVERY 6 HOURS PRN
Qty: 30 TABLET | Refills: 5 | Status: SHIPPED | OUTPATIENT
Start: 2025-05-12

## 2025-05-12 RX ORDER — BUTALBITAL, ACETAMINOPHEN AND CAFFEINE 50; 325; 40 MG/1; MG/1; MG/1
1 TABLET ORAL EVERY 6 HOURS PRN
Qty: 30 TABLET | Refills: 5 | Status: SHIPPED | OUTPATIENT
Start: 2025-05-12 | End: 2025-05-12 | Stop reason: SDUPTHER

## 2025-05-12 RX ORDER — SUMATRIPTAN SUCCINATE 25 MG/1
25 TABLET ORAL
Qty: 9 TABLET | Refills: 1 | Status: SHIPPED | OUTPATIENT
Start: 2025-05-12

## 2025-05-12 NOTE — TELEPHONE ENCOUNTER
No care due was identified.  [facility] Embedded Care Due Messages. Reference number: [id number].   5/12/2025 10:13:15 AM CDT

## 2025-05-16 DIAGNOSIS — S96.821A LACERATION OF EXTENSOR TENDON OF RIGHT FOOT, INITIAL ENCOUNTER: ICD-10-CM

## 2025-05-19 ENCOUNTER — OFFICE VISIT (OUTPATIENT)
Dept: PODIATRY | Facility: CLINIC | Age: 60
End: 2025-05-19
Payer: COMMERCIAL

## 2025-05-19 VITALS
DIASTOLIC BLOOD PRESSURE: 80 MMHG | WEIGHT: 177.25 LBS | SYSTOLIC BLOOD PRESSURE: 104 MMHG | HEIGHT: 63 IN | BODY MASS INDEX: 31.41 KG/M2 | HEART RATE: 88 BPM

## 2025-05-19 DIAGNOSIS — M79.671 RIGHT FOOT PAIN: ICD-10-CM

## 2025-05-19 DIAGNOSIS — G57.31 ENTRAPMENT OF RIGHT DEEP PERONEAL NERVE: ICD-10-CM

## 2025-05-19 DIAGNOSIS — S96.821A LACERATION OF EXTENSOR TENDON OF RIGHT FOOT, INITIAL ENCOUNTER: Primary | ICD-10-CM

## 2025-05-19 DIAGNOSIS — M96.89 DISORDER OF TENDON REPAIR: ICD-10-CM

## 2025-05-19 DIAGNOSIS — S96.821A LACERATION OF EXTENSOR TENDON OF RIGHT FOOT, INITIAL ENCOUNTER: ICD-10-CM

## 2025-05-19 PROCEDURE — 99214 OFFICE O/P EST MOD 30 MIN: CPT | Mod: 25,S$GLB,, | Performed by: PODIATRIST

## 2025-05-19 PROCEDURE — 99999 PR PBB SHADOW E&M-EST. PATIENT-LVL III: CPT | Mod: PBBFAC,,, | Performed by: PODIATRIST

## 2025-05-19 PROCEDURE — 64450 NJX AA&/STRD OTHER PN/BRANCH: CPT | Mod: RT,S$GLB,, | Performed by: PODIATRIST

## 2025-05-19 RX ORDER — OXYCODONE AND ACETAMINOPHEN 5; 325 MG/1; MG/1
1 TABLET ORAL EVERY 12 HOURS PRN
Qty: 20 TABLET | Refills: 0 | Status: SHIPPED | OUTPATIENT
Start: 2025-05-19 | End: 2025-05-19 | Stop reason: SDUPTHER

## 2025-05-19 RX ORDER — OXYCODONE AND ACETAMINOPHEN 5; 325 MG/1; MG/1
1 TABLET ORAL EVERY 12 HOURS PRN
Qty: 20 TABLET | Refills: 0 | Status: SHIPPED | OUTPATIENT
Start: 2025-05-19 | End: 2025-05-29

## 2025-05-20 ENCOUNTER — PATIENT MESSAGE (OUTPATIENT)
Dept: PRIMARY CARE CLINIC | Facility: CLINIC | Age: 60
End: 2025-05-20
Payer: COMMERCIAL

## 2025-05-20 ENCOUNTER — PATIENT MESSAGE (OUTPATIENT)
Dept: PODIATRY | Facility: CLINIC | Age: 60
End: 2025-05-20
Payer: COMMERCIAL

## 2025-05-21 ENCOUNTER — E-VISIT (OUTPATIENT)
Dept: PRIMARY CARE CLINIC | Facility: CLINIC | Age: 60
End: 2025-05-21
Payer: COMMERCIAL

## 2025-05-21 ENCOUNTER — PATIENT MESSAGE (OUTPATIENT)
Dept: PRIMARY CARE CLINIC | Facility: CLINIC | Age: 60
End: 2025-05-21

## 2025-05-21 DIAGNOSIS — T75.3XXA MOTION SICKNESS, INITIAL ENCOUNTER: Primary | ICD-10-CM

## 2025-05-21 RX ORDER — SCOPOLAMINE 1 MG/3D
1 PATCH, EXTENDED RELEASE TRANSDERMAL
Qty: 3 PATCH | Refills: 0 | Status: SHIPPED | OUTPATIENT
Start: 2025-05-21

## 2025-05-21 NOTE — PROGRESS NOTES
Patient ID: Judy Foley is a 59 y.o. female.    Chief Complaint: General Illness (Entered automatically based on patient selection in [facility].) and Medication Refill    The patient initiated a request through [facility] on 5/21/2025 for evaluation and management with a chief complaint of General Illness (Entered automatically based on patient selection in [facility].) and Medication Refill     I evaluated the questionnaire submission on 5/21/25    [facility]-Medication    5/21/2025  2:04 PM CDT - Filed by Patient   What do you need help with? Medication Request   Do you agree to participate in an E-Visit? Yes   If you have any of the following symptoms, please present to your local emergency room or call 911:  I acknowledge   Medication requests for narcotics will not be addressed via an E-Visit.  Please schedule an appointment. I acknowledge   Do you want to address a new or existing medication? I would like to start a new medication that I do not already take   What is the main issue you would like addressed today? Needing to see if I can get a patch for nausea cause I am going on vacation leaving Friday and we are going to be on a boat so I just wanna see if I can get a prescription for that   What is the name of the medication that you would like to start? Not sure of the name   Have you taken a similar medication in the past? No   Why are you requesting this particular medication? Going on boat and just wanna be prepared so I dont get seasick    What medical condition is the  medication intended to treat? Nausea for sea sickness   Provide any additional information you feel is important.    Please attach any relevant images or files    Are you able to take your vital signs? No         Encounter Diagnosis   Name Primary?    Motion sickness, initial encounter Yes        No orders of the defined types were placed in this encounter.     Medications Ordered This Encounter   Medications    scopolamine  (TRANSDERM-SCOP) 1.3-1.5 mg (1 mg over 3 days)     Sig: Place 1 patch onto the skin every 72 hours.     Dispense:  3 patch     Refill:  0        No follow-ups on file.      E-Visit Time Tracking:    Day 1 Time (in minutes): 5    Total Time (in minutes): 5

## 2025-05-26 NOTE — PROGRESS NOTES
Subjective:      Patient ID: Judy Foley is a 59 y.o. female.    Chief Complaint:   Foot Pain (Right foot pain burning and stinging and sharp pain. Stabbing pain) and Wound Care (Right foot)    Judy is a 59 y.o. female who presents to the podiatry clinic  with complaint of  right foot pain.  Four-month follow-up right extensor hallucis longus tendon tear/repair.  Physical therapy is helping.  Had a postoperative dehiscence which has resolved after seeing wound care.  No new complaints.  Overall improving/tendon strength slowly improvement as well.  Still taking pain medicine as needed.  Overall improving having nerve pain to the top of the foot.  Review of Systems   Constitutional: Negative for chills, decreased appetite, fever and malaise/fatigue.   HENT:  Negative for congestion, hearing loss, nosebleeds and tinnitus.    Eyes:  Negative for double vision, pain, photophobia and visual disturbance.   Cardiovascular:  Negative for chest pain, claudication, cyanosis and leg swelling.   Respiratory:  Negative for cough, hemoptysis, shortness of breath and wheezing.    Endocrine: Negative for cold intolerance and heat intolerance.   Hematologic/Lymphatic: Negative for adenopathy and bleeding problem.   Skin:  Negative for color change, dry skin, itching, nail changes and suspicious lesions.   Musculoskeletal:  Positive for joint pain, joint swelling and stiffness. Negative for arthritis.   Gastrointestinal:  Negative for abdominal pain, jaundice, nausea and vomiting.   Genitourinary:  Negative for dysuria, frequency and hematuria.   Neurological:  Negative for difficulty with concentration, loss of balance, numbness, paresthesias and sensory change.   Psychiatric/Behavioral:  Negative for altered mental status, hallucinations and suicidal ideas. The patient is not nervous/anxious.    Allergic/Immunologic: Negative for environmental allergies and persistent infections.           Objective:      Physical Exam  Vitals  reviewed.   Constitutional:       Appearance: She is well-developed.   HENT:      Head: Normocephalic and atraumatic.   Cardiovascular:      Pulses:           Dorsalis pedis pulses are 2+ on the right side and 2+ on the left side.        Posterior tibial pulses are 2+ on the right side and 2+ on the left side.   Pulmonary:      Effort: Pulmonary effort is normal.   Musculoskeletal:         General: Normal range of motion.      Comments: Left extensor hallucis longus tendon noted to be intact at this point.  Can appreciate firing of the tendon/muscle.  Overall improving.  Still significant weakness due to issue with wound dehiscence/scar tissue formation.  Overall improving however.   Skin:     General: Skin is warm and dry.      Capillary Refill: Capillary refill takes 2 to 3 seconds.      Comments: Skin turgor is normal bilaterally.  Skin texture is well hydrated to both lower extremities.  No lesions or rashes or wounds appreciated bilaterally.  Nail plates 1 through 5 bilaterally are within normal limits for length and thickness.  No nail clubbing or incurvation noted.   Neurological:      Mental Status: She is alert and oriented to person, place, and time.      Comments: Sharp dull light touch vibratory proprioceptive sensation are intact bilaterally.  Deep tendon reflexes to patellar and Achilles tendon are symmetrical 2 over 4 bilaterally.  No ankle clonus or Babinski reflexes noted bilaterally.  Coordination is normal to both feet and lower extremities.   Psychiatric:         Behavior: Behavior normal.       Positive Tinel sign/provocation sign noted right deep peroneal nerve.        Assessment:       Encounter Diagnoses   Name Primary?    Laceration of extensor tendon of right foot, initial encounter Yes    Right foot pain     Disorder of tendon repair     Entrapment of right deep peroneal nerve      Independent visualization of imaging was performed.  Results were reviewed in detail with patient.        Plan:       Judy was seen today for foot pain and wound care.    Diagnoses and all orders for this visit:    Laceration of extensor tendon of right foot, initial encounter    Right foot pain    Disorder of tendon repair    Entrapment of right deep peroneal nerve      I counseled the patient on her conditions, their implications and medical management.      The nature of the condition, options for management, as well as potential risks and complications were discussed in detail with patient. Patient was amenable to my recommendations and left my office fully informed and will follow up as instructed or sooner if necessary.      I again recommended patient be fitted for orthoses.  I explained that orthoses may improve function of the foot, reduce pain, decrease pronation, increase efficiency of muscle function of the foot and ankle and prevent surgery.  Alternative forms of biomechanical control of the foot and ankle were discussed with the patient.      Discussed options for peripheral neuropathy/nerve entrapment syndrome including nerve block therapy, surgical nerve entrapment decompression procedures, and various vitamins and supplementation available shown to improve nerve function.    Nerve injection:    Performed by:  Kurt Gomez DPM    Preop diagnosis:  Neuropathy symptoms/paresthesias/pain    The area overlying the right deep nerve(s) was sterilely prepped, verbal consent was obtained, 2 cc's of 0.5% Marcaine plain was infiltrated around the affected nerve(s) for a diagnostic nerve block.  This was well tolerated with no complications.    Continue topical compound medication for pain/neuropathy symptoms.  Follow-up in 3 months.

## 2025-06-02 DIAGNOSIS — S96.821A LACERATION OF EXTENSOR TENDON OF RIGHT FOOT, INITIAL ENCOUNTER: ICD-10-CM

## 2025-06-02 RX ORDER — OXYCODONE AND ACETAMINOPHEN 5; 325 MG/1; MG/1
1 TABLET ORAL EVERY 12 HOURS PRN
Qty: 20 TABLET | Refills: 0 | Status: SHIPPED | OUTPATIENT
Start: 2025-06-02 | End: 2025-06-12

## 2025-06-13 DIAGNOSIS — S96.821A LACERATION OF EXTENSOR TENDON OF RIGHT FOOT, INITIAL ENCOUNTER: ICD-10-CM

## 2025-06-14 RX ORDER — OXYCODONE AND ACETAMINOPHEN 5; 325 MG/1; MG/1
1 TABLET ORAL EVERY 12 HOURS PRN
Qty: 20 TABLET | Refills: 0 | Status: SHIPPED | OUTPATIENT
Start: 2025-06-14 | End: 2025-06-24

## 2025-06-18 RX ORDER — MELOXICAM 15 MG/1
TABLET ORAL
Qty: 30 TABLET | Refills: 2 | Status: SHIPPED | OUTPATIENT
Start: 2025-06-18

## 2025-06-22 DIAGNOSIS — S96.821A LACERATION OF EXTENSOR TENDON OF RIGHT FOOT, INITIAL ENCOUNTER: ICD-10-CM

## 2025-06-23 RX ORDER — OXYCODONE AND ACETAMINOPHEN 5; 325 MG/1; MG/1
1 TABLET ORAL EVERY 12 HOURS PRN
Qty: 20 TABLET | Refills: 0 | Status: SHIPPED | OUTPATIENT
Start: 2025-06-23 | End: 2025-07-03

## 2025-06-26 RX ORDER — SUMATRIPTAN SUCCINATE 25 MG/1
25 TABLET ORAL
Qty: 9 TABLET | Refills: 1 | Status: SHIPPED | OUTPATIENT
Start: 2025-06-26

## 2025-06-26 RX ORDER — BUTALBITAL, ACETAMINOPHEN AND CAFFEINE 50; 325; 40 MG/1; MG/1; MG/1
1 TABLET ORAL EVERY 6 HOURS PRN
Qty: 30 TABLET | Refills: 5 | Status: SHIPPED | OUTPATIENT
Start: 2025-06-26

## 2025-06-26 NOTE — TELEPHONE ENCOUNTER
No care due was identified.  [facility] Embedded Care Due Messages. Reference number: [id number].   6/26/2025 12:45:38 PM CDT

## 2025-07-01 DIAGNOSIS — S96.821A LACERATION OF EXTENSOR TENDON OF RIGHT FOOT, INITIAL ENCOUNTER: ICD-10-CM

## 2025-07-01 RX ORDER — OXYCODONE AND ACETAMINOPHEN 5; 325 MG/1; MG/1
1 TABLET ORAL EVERY 12 HOURS PRN
Qty: 20 TABLET | Refills: 0 | Status: SHIPPED | OUTPATIENT
Start: 2025-07-01 | End: 2025-07-11

## 2025-07-09 DIAGNOSIS — S96.821A LACERATION OF EXTENSOR TENDON OF RIGHT FOOT, INITIAL ENCOUNTER: ICD-10-CM

## 2025-07-09 RX ORDER — OXYCODONE AND ACETAMINOPHEN 5; 325 MG/1; MG/1
1 TABLET ORAL EVERY 12 HOURS PRN
Qty: 20 TABLET | Refills: 0 | Status: SHIPPED | OUTPATIENT
Start: 2025-07-09 | End: 2025-07-19

## 2025-07-23 DIAGNOSIS — S96.821A LACERATION OF EXTENSOR TENDON OF RIGHT FOOT, INITIAL ENCOUNTER: ICD-10-CM

## 2025-07-24 RX ORDER — OXYCODONE AND ACETAMINOPHEN 5; 325 MG/1; MG/1
1 TABLET ORAL EVERY 12 HOURS PRN
Qty: 20 TABLET | Refills: 0 | Status: SHIPPED | OUTPATIENT
Start: 2025-07-24 | End: 2025-08-03

## 2025-08-05 DIAGNOSIS — S96.821A LACERATION OF EXTENSOR TENDON OF RIGHT FOOT, INITIAL ENCOUNTER: ICD-10-CM

## 2025-08-06 RX ORDER — OXYCODONE AND ACETAMINOPHEN 5; 325 MG/1; MG/1
1 TABLET ORAL EVERY 12 HOURS PRN
Qty: 20 TABLET | Refills: 0 | Status: SHIPPED | OUTPATIENT
Start: 2025-08-06 | End: 2025-08-16

## 2025-08-13 ENCOUNTER — OFFICE VISIT (OUTPATIENT)
Dept: PRIMARY CARE CLINIC | Facility: CLINIC | Age: 60
End: 2025-08-13
Payer: COMMERCIAL

## 2025-08-13 VITALS
HEIGHT: 63 IN | DIASTOLIC BLOOD PRESSURE: 74 MMHG | BODY MASS INDEX: 31.1 KG/M2 | OXYGEN SATURATION: 98 % | WEIGHT: 175.5 LBS | SYSTOLIC BLOOD PRESSURE: 110 MMHG | HEART RATE: 71 BPM

## 2025-08-13 DIAGNOSIS — G43.009 MIGRAINE WITHOUT AURA AND WITHOUT STATUS MIGRAINOSUS, NOT INTRACTABLE: ICD-10-CM

## 2025-08-13 DIAGNOSIS — F41.9 ANXIETY: ICD-10-CM

## 2025-08-13 DIAGNOSIS — S91.311D LACERATION OF RIGHT FOOT, SUBSEQUENT ENCOUNTER: ICD-10-CM

## 2025-08-13 DIAGNOSIS — E66.09 CLASS 1 OBESITY DUE TO EXCESS CALORIES WITHOUT SERIOUS COMORBIDITY WITH BODY MASS INDEX (BMI) OF 31.0 TO 31.9 IN ADULT: ICD-10-CM

## 2025-08-13 DIAGNOSIS — E66.811 CLASS 1 OBESITY DUE TO EXCESS CALORIES WITHOUT SERIOUS COMORBIDITY WITH BODY MASS INDEX (BMI) OF 31.0 TO 31.9 IN ADULT: ICD-10-CM

## 2025-08-13 DIAGNOSIS — K21.9 GASTROESOPHAGEAL REFLUX DISEASE WITHOUT ESOPHAGITIS: ICD-10-CM

## 2025-08-13 DIAGNOSIS — Z00.00 ANNUAL PHYSICAL EXAM: Primary | ICD-10-CM

## 2025-08-13 PROBLEM — M25.674 DECREASED ROM OF RIGHT TOE: Status: RESOLVED | Noted: 2024-09-16 | Resolved: 2025-08-13

## 2025-08-13 PROBLEM — N95.9 MENOPAUSAL DISORDER: Status: RESOLVED | Noted: 2020-07-02 | Resolved: 2025-08-13

## 2025-08-13 PROBLEM — L97.512 ULCER OF RIGHT FOOT WITH FAT LAYER EXPOSED: Status: RESOLVED | Noted: 2024-10-14 | Resolved: 2025-08-13

## 2025-08-13 PROBLEM — R26.2: Status: RESOLVED | Noted: 2024-09-16 | Resolved: 2025-08-13

## 2025-08-13 PROBLEM — T14.8XXA TENDON TEAR: Status: RESOLVED | Noted: 2024-07-16 | Resolved: 2025-08-13

## 2025-08-13 PROBLEM — R29.898 DECREASED STRENGTH OF LOWER EXTREMITY: Status: RESOLVED | Noted: 2024-09-16 | Resolved: 2025-08-13

## 2025-08-13 PROCEDURE — 99396 PREV VISIT EST AGE 40-64: CPT | Mod: S$GLB,,, | Performed by: INTERNAL MEDICINE

## 2025-08-13 PROCEDURE — 99999 PR PBB SHADOW E&M-EST. PATIENT-LVL III: CPT | Mod: PBBFAC,,, | Performed by: INTERNAL MEDICINE

## 2025-08-15 DIAGNOSIS — S96.821A LACERATION OF EXTENSOR TENDON OF RIGHT FOOT, INITIAL ENCOUNTER: ICD-10-CM

## 2025-08-18 RX ORDER — OXYCODONE AND ACETAMINOPHEN 5; 325 MG/1; MG/1
1 TABLET ORAL EVERY 12 HOURS PRN
Qty: 20 TABLET | Refills: 0 | Status: SHIPPED | OUTPATIENT
Start: 2025-08-18 | End: 2025-08-28

## 2025-08-19 RX ORDER — SUMATRIPTAN SUCCINATE 25 MG/1
25 TABLET ORAL
Qty: 27 TABLET | Refills: 1 | Status: SHIPPED | OUTPATIENT
Start: 2025-08-19

## 2025-08-20 ENCOUNTER — PATIENT MESSAGE (OUTPATIENT)
Dept: PRIMARY CARE CLINIC | Facility: CLINIC | Age: 60
End: 2025-08-20
Payer: COMMERCIAL

## 2025-08-20 RX ORDER — BUTALBITAL, ACETAMINOPHEN AND CAFFEINE 50; 325; 40 MG/1; MG/1; MG/1
1 TABLET ORAL EVERY 6 HOURS PRN
Qty: 30 TABLET | Refills: 5 | Status: SHIPPED | OUTPATIENT
Start: 2025-08-20 | End: 2025-08-20 | Stop reason: SDUPTHER

## 2025-08-20 RX ORDER — BUTALBITAL, ACETAMINOPHEN AND CAFFEINE 50; 325; 40 MG/1; MG/1; MG/1
1 TABLET ORAL EVERY 6 HOURS PRN
Qty: 30 TABLET | Refills: 5 | Status: SHIPPED | OUTPATIENT
Start: 2025-08-20 | End: 2025-08-21 | Stop reason: SDUPTHER

## 2025-08-20 RX ORDER — BUTALBITAL, ACETAMINOPHEN AND CAFFEINE 50; 325; 40 MG/1; MG/1; MG/1
1 TABLET ORAL EVERY 6 HOURS PRN
Qty: 30 TABLET | Refills: 5 | Status: CANCELLED | OUTPATIENT
Start: 2025-08-20

## 2025-08-21 ENCOUNTER — PATIENT MESSAGE (OUTPATIENT)
Dept: PRIMARY CARE CLINIC | Facility: CLINIC | Age: 60
End: 2025-08-21
Payer: COMMERCIAL

## 2025-08-21 RX ORDER — BUTALBITAL, ACETAMINOPHEN AND CAFFEINE 50; 325; 40 MG/1; MG/1; MG/1
1 TABLET ORAL EVERY 6 HOURS PRN
Qty: 30 TABLET | Refills: 5 | Status: SHIPPED | OUTPATIENT
Start: 2025-08-21

## 2025-08-25 ENCOUNTER — OFFICE VISIT (OUTPATIENT)
Dept: PODIATRY | Facility: CLINIC | Age: 60
End: 2025-08-25
Payer: COMMERCIAL

## 2025-08-25 VITALS
BODY MASS INDEX: 31.33 KG/M2 | HEIGHT: 63 IN | SYSTOLIC BLOOD PRESSURE: 117 MMHG | WEIGHT: 176.81 LBS | HEART RATE: 57 BPM | DIASTOLIC BLOOD PRESSURE: 78 MMHG

## 2025-08-25 DIAGNOSIS — S96.821A LACERATION OF EXTENSOR TENDON OF RIGHT FOOT, INITIAL ENCOUNTER: ICD-10-CM

## 2025-08-25 DIAGNOSIS — G57.31 ENTRAPMENT OF RIGHT DEEP PERONEAL NERVE: ICD-10-CM

## 2025-08-25 DIAGNOSIS — M79.671 RIGHT FOOT PAIN: Primary | ICD-10-CM

## 2025-08-25 DIAGNOSIS — M96.89 DISORDER OF TENDON REPAIR: ICD-10-CM

## 2025-08-25 PROCEDURE — 99999 PR PBB SHADOW E&M-EST. PATIENT-LVL III: CPT | Mod: PBBFAC,,, | Performed by: PODIATRIST

## 2025-08-25 RX ORDER — TRAMADOL HYDROCHLORIDE 50 MG/1
50 TABLET, FILM COATED ORAL EVERY 12 HOURS PRN
Qty: 30 TABLET | Refills: 0 | Status: SHIPPED | OUTPATIENT
Start: 2025-08-25

## (undated) DEVICE — SUT VICRYL 3-0 27 SH

## (undated) DEVICE — Device

## (undated) DEVICE — ALCOHOL 70% ISOP W/GREEN 16OZ

## (undated) DEVICE — NDL 25 GA X 1.5"

## (undated) DEVICE — BANDAGE ELASTIC 2X5 VELCRO ST

## (undated) DEVICE — PAD PREP CUFFED NS 24X48IN

## (undated) DEVICE — GLOVE SURG BIOGEL LATEX SZ 7.5

## (undated) DEVICE — SEE MEDLINE ITEM 157117

## (undated) DEVICE — SUT ETHILON 5-0 PS-2 18IN

## (undated) DEVICE — DRESSING XEROFORM NONADH 1X8IN

## (undated) DEVICE — APPLICATOR CHLORAPREP ORN 26ML

## (undated) DEVICE — SEE MEDLINE ITEM 157173

## (undated) DEVICE — CUFF TOURNIQUET DL PRT

## (undated) DEVICE — SEE MEDLINE ITEM 157116

## (undated) DEVICE — SUT ETHILON 3-0 PS2 18 BLK

## (undated) DEVICE — NDL HYPO STD REG BVL 18GX1.5IN

## (undated) DEVICE — BANDAGE MATRIX HK LOOP 4IN 5YD

## (undated) DEVICE — BLADE SCALP OPHTL BEVEL STR

## (undated) DEVICE — SEE L#120831

## (undated) DEVICE — CHLORAPREP ORANGE TINT 26ML

## (undated) DEVICE — PAD PREP 50/CA

## (undated) DEVICE — BLADE SURG #15 CARBON STEEL

## (undated) DEVICE — DRAPE T EXTRM SURG 121X128X90

## (undated) DEVICE — STOCKINET TUBULAR 1 PLY 6X60IN

## (undated) DEVICE — STOCKINET 4INX48

## (undated) DEVICE — ELECTRODE REM PLYHSV RETURN 9

## (undated) DEVICE — BANDAGE ESMARK 4 BLUE"

## (undated) DEVICE — PAD CAST SPECIALIST 2X4

## (undated) DEVICE — DRESSING XEROFORM FOIL PK 1X8

## (undated) DEVICE — MANIFOLD 4 PORT

## (undated) DEVICE — TRAY MINOR ORTHO OMC

## (undated) DEVICE — SUT MCRYL PLUS 4-0 PS2 27IN

## (undated) DEVICE — GAUZE SPONGE 4X4 12PLY

## (undated) DEVICE — SEE MEDLINE ITEM 152522

## (undated) DEVICE — BANDAGE ROLL COTTN 4.5INX4.1YD

## (undated) DEVICE — NDL HYPO REG 25G X 1 1/2

## (undated) DEVICE — COVER OVERHEAD SURG LT BLUE

## (undated) DEVICE — DRESSING XEROFORM 1X8IN

## (undated) DEVICE — SOL IRR SOD CHL .9% POUR

## (undated) DEVICE — PACK BASIC

## (undated) DEVICE — PAD CAST 2 IN X 4YDS STERILE

## (undated) DEVICE — SEE MEDLINE ITEM 156955

## (undated) DEVICE — GAUZE AVANT SPNG 4PLY STRL 4X4

## (undated) DEVICE — SYR B-D DISP CONTROL 10CC100/C

## (undated) DEVICE — SYR 10CC LUER LOCK